# Patient Record
Sex: FEMALE | Race: WHITE | NOT HISPANIC OR LATINO | URBAN - METROPOLITAN AREA
[De-identification: names, ages, dates, MRNs, and addresses within clinical notes are randomized per-mention and may not be internally consistent; named-entity substitution may affect disease eponyms.]

---

## 2020-04-04 ENCOUNTER — OFFICE VISIT (OUTPATIENT)
Dept: URGENT CARE | Age: 45
End: 2020-04-04

## 2020-04-04 VITALS
SYSTOLIC BLOOD PRESSURE: 118 MMHG | DIASTOLIC BLOOD PRESSURE: 81 MMHG | RESPIRATION RATE: 20 BRPM | HEART RATE: 79 BPM | OXYGEN SATURATION: 96 % | TEMPERATURE: 100.4 F

## 2020-04-04 DIAGNOSIS — J06.9 ACUTE UPPER RESPIRATORY INFECTION, UNSPECIFIED: Primary | ICD-10-CM

## 2020-04-04 PROCEDURE — 99203 OFFICE O/P NEW LOW 30 MIN: CPT | Performed by: INTERNAL MEDICINE

## 2020-04-04 RX ORDER — DOXYCYCLINE HYCLATE 100 MG
100 TABLET ORAL 2 TIMES DAILY
Qty: 20 TABLET | Refills: 0 | Status: SHIPPED | OUTPATIENT
Start: 2020-04-04

## 2020-04-04 RX ORDER — BENZONATATE 200 MG/1
200 CAPSULE ORAL 3 TIMES DAILY PRN
Qty: 30 CAPSULE | Refills: 0 | Status: SHIPPED | OUTPATIENT
Start: 2020-04-04 | End: 2020-04-14

## 2020-12-22 PROBLEM — K58.9 IBS (IRRITABLE BOWEL SYNDROME): Status: RESOLVED | Noted: 2020-04-04 | Resolved: 2020-12-22

## 2020-12-22 PROBLEM — J30.2 SEASONAL ALLERGIES: Status: ACTIVE | Noted: 2020-04-04

## 2020-12-22 PROBLEM — Z15.01 BRCA1 POSITIVE: Status: ACTIVE | Noted: 2020-12-22

## 2020-12-22 PROBLEM — Z15.09 BRCA1 POSITIVE: Status: ACTIVE | Noted: 2020-12-22

## 2020-12-22 PROBLEM — Z90.5 H/O RIGHT NEPHRECTOMY: Status: ACTIVE | Noted: 2020-07-23

## 2020-12-22 PROBLEM — K21.9 GERD (GASTROESOPHAGEAL REFLUX DISEASE): Status: ACTIVE | Noted: 2020-07-23

## 2020-12-22 PROBLEM — C49.A0 GASTROINTESTINAL STROMAL TUMOR (GIST) (HCC): Status: ACTIVE | Noted: 2020-04-04

## 2020-12-22 PROBLEM — K58.9 IBS (IRRITABLE BOWEL SYNDROME): Status: ACTIVE | Noted: 2020-04-04

## 2020-12-22 PROBLEM — Z15.09 BRCA1 POSITIVE: Status: RESOLVED | Noted: 2020-12-22 | Resolved: 2020-12-22

## 2020-12-22 PROBLEM — Z15.01 BRCA1 POSITIVE: Status: RESOLVED | Noted: 2020-12-22 | Resolved: 2020-12-22

## 2021-03-11 PROCEDURE — 88305 TISSUE EXAM BY PATHOLOGIST: CPT | Performed by: RADIOLOGY

## 2022-08-08 ENCOUNTER — LAB ENCOUNTER (OUTPATIENT)
Dept: LAB | Age: 47
End: 2022-08-08
Attending: STUDENT IN AN ORGANIZED HEALTH CARE EDUCATION/TRAINING PROGRAM
Payer: COMMERCIAL

## 2022-08-08 DIAGNOSIS — R19.7 DIARRHEA, UNSPECIFIED TYPE: ICD-10-CM

## 2022-08-08 LAB — IGA SERPL-MCNC: 301 MG/DL (ref 70–312)

## 2022-08-08 PROCEDURE — 36415 COLL VENOUS BLD VENIPUNCTURE: CPT

## 2022-08-08 PROCEDURE — 86364 TISS TRNSGLTMNASE EA IG CLAS: CPT

## 2022-08-08 PROCEDURE — 82784 ASSAY IGA/IGD/IGG/IGM EACH: CPT

## 2022-08-09 LAB — TTG IGA SER-ACNC: 0.9 U/ML (ref ?–7)

## 2022-08-11 NOTE — PROGRESS NOTES
Jairo Thompson,    Your blood work shows no signs of celiac disease. Please let me know if you have any questions or concerns.      Sincerely,  Evens Castellon MD [[email or web address]] : Advised in office follow-up visit in 3 months to assess her neck and back pain and headaches.

## 2025-02-14 ENCOUNTER — OFFICE VISIT (OUTPATIENT)
Dept: SURGERY | Facility: CLINIC | Age: 50
End: 2025-02-14
Payer: COMMERCIAL

## 2025-02-14 VITALS
BODY MASS INDEX: 28 KG/M2 | DIASTOLIC BLOOD PRESSURE: 89 MMHG | OXYGEN SATURATION: 97 % | HEART RATE: 79 BPM | WEIGHT: 175 LBS | SYSTOLIC BLOOD PRESSURE: 140 MMHG

## 2025-02-14 DIAGNOSIS — C49.A0 GASTROINTESTINAL STROMAL TUMOR (GIST) (HCC): ICD-10-CM

## 2025-02-14 DIAGNOSIS — C78.6 SECONDARY MALIGNANCY OF PARIETAL PERITONEUM (HCC): Primary | ICD-10-CM

## 2025-02-14 DIAGNOSIS — R19.7 DIARRHEA, UNSPECIFIED TYPE: ICD-10-CM

## 2025-02-14 PROCEDURE — 3079F DIAST BP 80-89 MM HG: CPT | Performed by: SURGERY

## 2025-02-14 PROCEDURE — 3077F SYST BP >= 140 MM HG: CPT | Performed by: SURGERY

## 2025-02-14 PROCEDURE — 99205 OFFICE O/P NEW HI 60 MIN: CPT | Performed by: SURGERY

## 2025-02-14 NOTE — PATIENT INSTRUCTIONS
Surgery:  Cytoreductive surgery, possible multi-organ resection    Date of Surgery:      Hospital:    [address] [address]  Phone: [phone]    This is an inpatient procedure.  Use the provided Chlorhexadine surgical soap(instructions attached) to shower the night before and morning of your procedure.  Do not apply powders, creams, lotions or deodorant after showering.  Do not apply any kind of makeup and make sure to remove nail polish prior to your surgery.  For faster recovery from anesthesia and surgery please follow the instructions below regarding your pre-op diet:    Bowel Prep diet and instructions for colon surgery:  The day before surgery you may have a small breakfast and then start a clear liquid diet. This diet includes: clear beverages, clear soup or broth, and Jell-O.   A liquid bowel preparation will be called into your pharmacy. You must drink this preparation the day before surgery to clean out your colon. Follow the directions for the prep on the prescription and begin to take the prep between 2pm-3pm the day prior to surgery.  You will also need to take 2 oral antibiotics the day before your surgery to reduce the risk of infection. You will get a prescription for these or they will be sent electronically to your pharmacy. The antibiotics include: Neomycin 1gram and Flagyl 500mg. You will take these as directed at 2pm, 3pm, and 10pm the day before surgery.   12 hours prior to your surgery time you are to drink one 10oz bottle of Ensure Pre-Surgery Drink. You are to have NO solid food or water after 11pm the night before your surgery EXCEPT one additional 10oz bottle of Ensure Pre-Surgery Drink. You need to finish drinking this 4 hours prior to surgery time.    Take Tylenol 1000mg by mouth at the time of your second Ensure Pre-Surgery drink(4hrs prior to surgery time), unless instructed otherwise by your surgeon.     Bring your picture ID and insurance  card with you.  Wear comfortable clothing that can easily be removed. Preferably, something that zips, snaps, or buttons up the front.   You will be contacted by the hospital for pre-admission COVID-19 testing AND the day prior to your surgery to confirm details and give you specific instructions about when and where to arrive the day of your procedure.   If you are taking blood thinners including: Plavix, Eliquis, Coumadin you will need to contact the prescribing provider for specific instructions on holding these medications for your procedure  Motrin, Advil, Ibuprofen, Aspirin, Baby Aspirin and Fish Oil are also blood thinners and need to be held at least one week prior to your procedure. It is okay to take Tylenol.  Inform your primary care physician of your surgery and ask if him/her will need to see you prior to surgery.      Pre-Operative Testing  x CBC x CMP  BMP    PT, PTT, INR  UA x EKG    Chest X-Ray  Cardiac Clearance x [facility] Clearance     Tomás Be MD, FACS  Chief of Surgical Oncology  Co-Medical Director, Oncology Services  Professor of Surgery    For Dr. Be's office: [phone]  Fax: [phone]  After hours you will reach the answering service     Central [phone]  Medical Records:   [phone]

## 2025-02-14 NOTE — PROGRESS NOTES
[facility]      Patient Name:  Mark Stearns   YOB: 1975   Gender:  Female   Appt Date:  2/14/2025   Provider:  Tomás Be MD     PATIENT PROVIDERS  Referring Provider: Jakub Reddy MD    Primary Care Provider:Massimo Bright MD   Address: [address] [place] [address]  [place] [address]   Phone #: [phone]       CHIEF COMPLAINT  Chief Complaint   Patient presents with    Consult        PROBLEMS  Reviewed   Patient Active Problem List   Diagnosis    Gastrointestinal stromal tumor (GIST) (HCC)    GERD (gastroesophageal reflux disease)    H/O right nephrectomy    Seasonal allergies        History of Present Illness:  Patient is a 49-year-old woman who is currently being referred for consideration of surgical management of metastatic GIST.  Oncology history:  2002 - incidentally found small bowel GIST (age 27) when undergoing a left nephrectomy for RCC; evaluated at [facility] by Dr. Wagner and given 1 yr of adjuvant imatinib followed by serial imaging    7/31/20 - CT w/ subtle recurrent intraperitoneal mass lesion, including 5.6 x 4.0 x 5.1 cm L central mesenteric mass w/ central necrosis c/f recurrent GIST    8/20/20 - resection at [facility] ([place]) of ~20 peritoneal masses, 1 adherent to sigmoid colon; 4 main tumors (largest ~7 cm, smallest ~2 cm), and 2 cm tumor inside of small bowel mesentery, no tumors ruptured, 16 very tiny tumors (mostly on surface of small intestine); NSG w/ exon 11 KIT [id number] [id number]; path w/ GIST, spindle cell type, low grade (mitotic rate 0/5 mm2); peritoneal masses were 6.8 cm and 4.2 cm; mesenteric nodule was 2.8 cm; assorted peritoneal nodules were < 0.7 cm; R pelvic mass was 3.3 cm    10/2/20 - started on imatinib by Yesica Jorge at [facility]  11/11/20 - genetic counseling, germline testing negative    11/27/20 - CT w/ single residual mass LUQ lesion, slightly decreased; one additional very small possible nodule adjacent  to duodenal jejunal junction; mass either in posterior peritoneum or RP in LUQ at caudal margin of spleen, 3.0 x 2.3 cm    12/4/20 - only on brand name Gleevec x1 mo w/ tingling on scalp, periorbital edema, and facial tingling; MR brain negative; switched to generic imatinib w/ significant improvement in sx; takes imatinib w/ Compazine to help nausea    2/22/21 - CT w/ mild decrease of LUQ mass adjacent to distal duodenum, likely residual GIST; no e/o mets; mild dcrease in mass in posterior peritoneum vs RP, 2.8 x 1.6 cm, less conspicuous adjacent nodularity    6/21/21 - CT w/ LUQ mass no significant change; no new lesions; nodularity adjacent to 3rd/4th portion of duodenum similar, 9 x 7 mm    9/24/21 - CT w/ increased nodules from proximal descending colon and 4th portin of duodenum c/w enlarging GIST tumors    11/5/21 - CT w/ increased nodules size abutting 4th portion of duodenum/proximal descending colon; stable 0.8 cm nodule in medial R abdominis rectus muscle c/f tumor; 3.2 x 2.4 cm nodule inferior to spleen/proximal descending colon; 1.8 x 1.3 cm nodule anterior to 3rd/4th portion of duodenum; 0.8 x 0.6 cm nodule in medial aspect of R abdominis muscle    11/20/21 - increased imatinib to 400 mg BID  1/3/22 - slightly increased L-sided abdominal pain  1/25/22 - CT stable  4/29/22 CT AP: stable  8/1/22 CT AP: stable  12/6/22 CT AP  No significant change in heterogeneous mass in the left retroperitoneum and small soft  tissue nodule in the right rectus abdominis muscle. No CT evidence of new metastatic disease.  4/17/23 CT AP: stable exam  12/5/23 CT AP stable exam  4/23/24 CT AP  Increase in size of mass in the left renal fossa   Guardant KIT [id number] del, exon 11  6/3/24 day 1 cycle 1 sunatenib   11/13/2025: Interval decrease in size of the left perisplenic/renal fossa mass and lesion within the right rectus abdominis muscle.   2/11/2025: Continued decrease in size     Vital Signs:  /89 (BP Location:  Left arm, Patient Position: Sitting)   Pulse 79   Wt 79.4 kg (175 lb)   SpO2 97%   BMI 28.25 kg/m²      Medications Reviewed:    Current Outpatient Medications:     ondansetron (ZOFRAN) 8 MG tablet, Take 8 mg by mouth 2 (two) times a day., Disp: , Rfl:     PEG 3350-KCl-Na Bicarb-NaCl 420 g Oral Recon Soln, Take as directed by physician., Disp: 4000 mL, Rfl: 0    Imatinib Mesylate 400 MG Oral Tab, , Disp: , Rfl:     Omeprazole 40 MG Oral Capsule Delayed Release, Take 40 mg by mouth daily as needed., Disp: , Rfl:      Allergies Reviewed:  Allergies[1]     History:  Reviewed:  Past Medical History:    Gastrointestinal stromal tumor (GIST) (HCC)    followed at [facility], on chemo    Renal cell carcinoma, left (HCC)      Reviewed:  Past Surgical History:   Procedure Laterality Date    Bowel resection  08/20/2020    25 tumors resected from small bowel    Nephrectomy Left 11/2002    partial small bowel resection      Reviewed Social History:  Social History     Socioeconomic History    Marital status:    Tobacco Use    Smoking status: Never    Smokeless tobacco: Never   Vaping Use    Vaping status: Never Used   Substance and Sexual Activity    Alcohol use: Never    Drug use: Never   Social History Narrative    , 2 children.  Worked as  at [employer]. 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  Walking for exercise.      Social Drivers of Health      Received from [facility]    Housing Stability      Reviewed:  Family History   Problem Relation Age of Onset    Diabetes Mother     Thyroid Disorder Sister     Cancer Son 1        Wilms      Review of Systems:  GENERAL HEALTH: fatigue.   SKIN: no change in mole.   RESPIRATORY: denies shortness of breath, wheezing or cough   CARDIOVASCULAR: denies chest pain, SOB, edema,orthopnea, no palpitations   GI: denies nausea, vomiting, constipation, ++diarrhea; no rectal bleeding  GENITAL/: no blood in urine  MUSCULOSKELETAL: no joint  complaints, no back pain  NEURO: no tingling, numbness, weakness  ENDOCRINE: weight loss  PSYCH: no mood changes       Physical Examination:  Constitutional:  NAD.   Eyes: Sclera: non-icteric.   Lymph Nodes: Lymph Nodes no cervical LAD, supraclavicular LAD, axillary LAD, or inguinal LAD.   Lungs: Normal respiratory effort.  Cardiovascular: Well-perfused.  Abdomen: Soft, nontender, nondistended, no masses, no hepatosplenomegaly.  Musculoskeletal: Extremities: no edema.  No back or flank tenderness.  Skin: Inspection and palpation: no jaundice.      Document Review:  Summarized above.  I reviewed the 2 most recent CT scans.       Procedure(s):  None     Assessment / Plan:  GIST with peritoneal mets  Findings were discussed with patient at length.  Her  was present.  The case was also discussed at our multidisciplinary tumor board.  I further discussed with Dr. Reddy from medical oncology.  Given indolent disease, it is reasonable to consider surgery.  This will entail cytoreduction with possible multiorgan resection.  The surgical treatment matter including indications, rationale, and risks was discussed in detail.  Patient agreed and understood.  Arrangements will be made to schedule the procedure.  Patient had ample time to ask questions.     Diarrhea  Secondary to sunatenib which had been stopped 2 days ago.  Will refer to our GI colleagues for further management.    Screening  Patient has not had colonoscopy.  Preferred to do so prior to surgical management.            Electronically Signed by: Tomás Be MD           [1]   Allergies  Allergen Reactions    Codeine ITCHING, SHORTNESS OF BREATH, SWELLING and TONGUE SWELLING    Penicillins RASH, SHORTNESS OF BREATH, SWELLING and TONGUE SWELLING

## 2025-02-14 NOTE — H&P (VIEW-ONLY)
[facility]      Patient Name:  Mark Stearns   YOB: 1975   Gender:  Female   Appt Date:  2/14/2025   Provider:  Tomás Be MD     PATIENT PROVIDERS  Referring Provider: Jakub Reddy MD    Primary Care Provider:Massimo Bright MD   Address: [address] [place] [address]  [place] [address]   Phone #: [phone]       CHIEF COMPLAINT  Chief Complaint   Patient presents with    Consult        PROBLEMS  Reviewed   Patient Active Problem List   Diagnosis    Gastrointestinal stromal tumor (GIST) (HCC)    GERD (gastroesophageal reflux disease)    H/O right nephrectomy    Seasonal allergies        History of Present Illness:  Patient is a 49-year-old woman who is currently being referred for consideration of surgical management of metastatic GIST.  Oncology history:  2002 - incidentally found small bowel GIST (age 27) when undergoing a left nephrectomy for RCC; [place] [facility] [place] Wagner and given 1 yr of adjuvant imatinib followed by serial imaging    7/31/20 - CT w/ subtle recurrent intraperitoneal mass lesion, including 5.6 x 4.0 x 5.1 cm L central mesenteric mass w/ central necrosis c/f recurrent GIST    8/20/20 - resection at [facility] ([place]) of ~20 peritoneal masses, 1 adherent to sigmoid colon; 4 main tumors (largest ~7 cm, smallest ~2 cm), and 2 cm tumor inside of small bowel mesentery, no tumors ruptured, 16 very tiny tumors (mostly on surface of small intestine); NSG w/ exon 11 KIT [id number] c.[id number]; path w/ GIST, spindle cell type, low grade (mitotic rate 0/5 mm2); peritoneal masses were 6.8 cm and 4.2 cm; mesenteric nodule was 2.8 cm; assorted peritoneal nodules were < 0.7 cm; R pelvic mass was 3.3 cm    10/2/20 - started on imatinib by Yesica Jorge at [facility]  11/11/20 - genetic counseling, germline testing negative    11/27/20 - CT w/ single residual mass LUQ lesion, slightly decreased; one additional very small possible nodule adjacent  to duodenal jejunal junction; mass either in posterior peritoneum or RP in LUQ at caudal margin of spleen, 3.0 x 2.3 cm    12/4/20 - only on brand name Gleevec x1 mo w/ tingling on scalp, periorbital edema, and facial tingling; MR brain negative; switched to generic imatinib w/ significant improvement in sx; takes imatinib w/ Compazine to help nausea    2/22/21 - CT w/ mild decrease of LUQ mass adjacent to distal duodenum, likely residual GIST; no e/o mets; mild dcrease in mass in posterior peritoneum vs RP, 2.8 x 1.6 cm, less conspicuous adjacent nodularity    6/21/21 - CT w/ LUQ mass no significant change; no new lesions; nodularity adjacent to 3rd/4th portion of duodenum similar, 9 x 7 mm    9/24/21 - CT w/ increased nodules from proximal descending colon and 4th portin of duodenum c/w enlarging GIST tumors    11/5/21 - CT w/ increased nodules size abutting 4th portion of duodenum/proximal descending colon; stable 0.8 cm nodule in medial R abdominis rectus muscle c/f tumor; 3.2 x 2.4 cm nodule inferior to spleen/proximal descending colon; 1.8 x 1.3 cm nodule anterior to 3rd/4th portion of duodenum; 0.8 x 0.6 cm nodule in medial aspect of R abdominis muscle    11/20/21 - increased imatinib to 400 mg BID  1/3/22 - slightly increased L-sided abdominal pain  1/25/22 - CT stable  4/29/22 CT AP: stable  8/1/22 CT AP: stable  12/6/22 CT AP  No significant change in heterogeneous mass in the left retroperitoneum and small soft  tissue nodule in the right rectus abdominis muscle. No CT evidence of new metastatic disease.  4/17/23 CT AP: stable exam  12/5/23 CT AP stable exam  4/23/24 CT AP  Increase in size of mass in the left renal fossa   Guardant KIT [id number] del, exon 11  6/3/24 day 1 cycle 1 sunatenib   11/13/2025: Interval decrease in size of the left perisplenic/renal fossa mass and lesion within the right rectus abdominis muscle.   2/11/2025: Continued decrease in size     Vital Signs:  /89 (BP Location:  Left arm, Patient Position: Sitting)   Pulse 79   Wt 79.4 kg (175 lb)   SpO2 97%   BMI 28.25 kg/m²      Medications Reviewed:    Current Outpatient Medications:     ondansetron (ZOFRAN) 8 MG tablet, Take 8 mg by mouth 2 (two) times a day., Disp: , Rfl:     PEG 3350-KCl-Na Bicarb-NaCl 420 g Oral Recon Soln, Take as directed by physician., Disp: 4000 mL, Rfl: 0    Imatinib Mesylate 400 MG Oral Tab, , Disp: , Rfl:     Omeprazole 40 MG Oral Capsule Delayed Release, Take 40 mg by mouth daily as needed., Disp: , Rfl:      Allergies Reviewed:  Allergies[1]     History:  Reviewed:  Past Medical History:    Gastrointestinal stromal tumor (GIST) (HCC)    followed at [facility], on chemo    Renal cell carcinoma, left (HCC)      Reviewed:  Past Surgical History:   Procedure Laterality Date    Bowel resection  08/20/2020    25 tumors resected from small bowel    Nephrectomy Left 11/2002    partial small bowel resection      Reviewed Social History:  Social History     Socioeconomic History    Marital status:    Tobacco Use    Smoking status: Never    Smokeless tobacco: Never   Vaping Use    Vaping status: Never Used   Substance and Sexual Activity    Alcohol use: Never    Drug use: Never   Social History Narrative    , 2 children.  Worked as  at [employer]. 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  Walking for exercise.      Social Drivers of Health      Received from [facility]    Housing Stability      Reviewed:  Family History   Problem Relation Age of Onset    Diabetes Mother     Thyroid Disorder Sister     Cancer Son 1        Wilms      Review of Systems:  GENERAL HEALTH: fatigue.   SKIN: no change in mole.   RESPIRATORY: denies shortness of breath, wheezing or cough   CARDIOVASCULAR: denies chest pain, SOB, edema,orthopnea, no palpitations   GI: denies nausea, vomiting, constipation, ++diarrhea; no rectal bleeding  GENITAL/: no blood in urine  MUSCULOSKELETAL: no joint  complaints, no back pain  NEURO: no tingling, numbness, weakness  ENDOCRINE: weight loss  PSYCH: no mood changes       Physical Examination:  Constitutional:  NAD.   Eyes: Sclera: non-icteric.   Lymph Nodes: Lymph Nodes no cervical LAD, supraclavicular LAD, axillary LAD, or inguinal LAD.   Lungs: Normal respiratory effort.  Cardiovascular: Well-perfused.  Abdomen: Soft, nontender, nondistended, no masses, no hepatosplenomegaly.  Musculoskeletal: Extremities: no edema.  No back or flank tenderness.  Skin: Inspection and palpation: no jaundice.      Document Review:  Summarized above.  I reviewed the 2 most recent CT scans.       Procedure(s):  None     Assessment / Plan:  GIST with peritoneal mets  Findings were discussed with patient at length.  Her  was present.  The case was also discussed at our multidisciplinary tumor board.  I further discussed with Dr. Reddy from medical oncology.  Given indolent disease, it is reasonable to consider surgery.  This will entail cytoreduction with possible multiorgan resection.  The surgical treatment matter including indications, rationale, and risks was discussed in detail.  Patient agreed and understood.  Arrangements will be made to schedule the procedure.  Patient had ample time to ask questions.     Diarrhea  Secondary to sunatenib which had been stopped 2 days ago.  Will refer to our GI colleagues for further management.    Screening  Patient has not had colonoscopy.  Preferred to do so prior to surgical management.            Electronically Signed by: Tomás Be MD           [1]   Allergies  Allergen Reactions    Codeine ITCHING, SHORTNESS OF BREATH, SWELLING and TONGUE SWELLING    Penicillins RASH, SHORTNESS OF BREATH, SWELLING and TONGUE SWELLING

## 2025-02-20 DIAGNOSIS — C49.A0 GASTROINTESTINAL STROMAL TUMOR (GIST) (HCC): Primary | ICD-10-CM

## 2025-02-21 PROBLEM — I10 PRIMARY HYPERTENSION: Status: ACTIVE | Noted: 2024-08-20

## 2025-02-21 PROBLEM — C78.7 MALIGNANT NEOPLASM METASTATIC TO LIVER (HCC): Status: ACTIVE | Noted: 2020-07-23

## 2025-02-21 PROBLEM — C49.A0 GIST (GASTROINTESTINAL STROMAL TUMOR), MALIGNANT (HCC): Status: ACTIVE | Noted: 2025-02-21

## 2025-02-21 PROBLEM — C79.9 METASTATIC ADENOCARCINOMA (HCC): Status: ACTIVE | Noted: 2023-10-24

## 2025-02-23 PROBLEM — Z12.11 COLON CANCER SCREENING: Status: ACTIVE | Noted: 2025-02-23

## 2025-03-05 ENCOUNTER — LABORATORY ENCOUNTER (OUTPATIENT)
Dept: LAB | Facility: HOSPITAL | Age: 50
End: 2025-03-05
Attending: SURGERY
Payer: COMMERCIAL

## 2025-03-05 ENCOUNTER — ANESTHESIA EVENT (OUTPATIENT)
Dept: SURGERY | Facility: HOSPITAL | Age: 50
End: 2025-03-05
Payer: COMMERCIAL

## 2025-03-05 DIAGNOSIS — Z01.818 PRE-OP TESTING: ICD-10-CM

## 2025-03-05 LAB
ANTIBODY SCREEN: NEGATIVE
RH BLOOD TYPE: POSITIVE

## 2025-03-05 PROCEDURE — 86900 BLOOD TYPING SEROLOGIC ABO: CPT

## 2025-03-05 PROCEDURE — 86901 BLOOD TYPING SEROLOGIC RH(D): CPT

## 2025-03-05 PROCEDURE — 86850 RBC ANTIBODY SCREEN: CPT

## 2025-03-11 ENCOUNTER — ANESTHESIA EVENT (OUTPATIENT)
Dept: ENDOSCOPY | Facility: HOSPITAL | Age: 50
End: 2025-03-11

## 2025-03-11 ENCOUNTER — ANESTHESIA (OUTPATIENT)
Dept: ENDOSCOPY | Facility: HOSPITAL | Age: 50
End: 2025-03-11

## 2025-03-11 ENCOUNTER — HOSPITAL ENCOUNTER (OUTPATIENT)
Facility: HOSPITAL | Age: 50
Setting detail: HOSPITAL OUTPATIENT SURGERY
Discharge: HOME | DRG: 330 | End: 2025-03-11
Attending: INTERNAL MEDICINE | Admitting: INTERNAL MEDICINE
Payer: COMMERCIAL

## 2025-03-11 ENCOUNTER — HOSPITAL ENCOUNTER (OUTPATIENT)
Facility: HOSPITAL | Age: 50
Setting detail: HOSPITAL OUTPATIENT SURGERY
Discharge: HOME | End: 2025-03-11
Attending: INTERNAL MEDICINE | Admitting: INTERNAL MEDICINE
Payer: COMMERCIAL

## 2025-03-11 VITALS
TEMPERATURE: 98 F | RESPIRATION RATE: 18 BRPM | DIASTOLIC BLOOD PRESSURE: 80 MMHG | SYSTOLIC BLOOD PRESSURE: 112 MMHG | HEART RATE: 65 BPM | WEIGHT: 170 LBS | OXYGEN SATURATION: 99 % | BODY MASS INDEX: 27.32 KG/M2 | HEIGHT: 66 IN

## 2025-03-11 DIAGNOSIS — Z12.11 SCREEN FOR COLON CANCER: ICD-10-CM

## 2025-03-11 LAB — B-HCG UR QL: NEGATIVE

## 2025-03-11 PROCEDURE — 88305 TISSUE EXAM BY PATHOLOGIST: CPT | Performed by: INTERNAL MEDICINE

## 2025-03-11 PROCEDURE — 81025 URINE PREGNANCY TEST: CPT

## 2025-03-11 PROCEDURE — 0DBE8ZX EXCISION OF LARGE INTESTINE, VIA NATURAL OR ARTIFICIAL OPENING ENDOSCOPIC, DIAGNOSTIC: ICD-10-PCS | Performed by: INTERNAL MEDICINE

## 2025-03-11 RX ORDER — SODIUM CHLORIDE, SODIUM LACTATE, POTASSIUM CHLORIDE, CALCIUM CHLORIDE 600; 310; 30; 20 MG/100ML; MG/100ML; MG/100ML; MG/100ML
INJECTION, SOLUTION INTRAVENOUS CONTINUOUS
Status: CANCELLED | OUTPATIENT
Start: 2025-03-11

## 2025-03-11 RX ORDER — LIDOCAINE HYDROCHLORIDE 10 MG/ML
INJECTION, SOLUTION EPIDURAL; INFILTRATION; INTRACAUDAL; PERINEURAL AS NEEDED
Status: DISCONTINUED | OUTPATIENT
Start: 2025-03-11 | End: 2025-03-11 | Stop reason: SURG

## 2025-03-11 RX ORDER — SODIUM CHLORIDE, SODIUM LACTATE, POTASSIUM CHLORIDE, CALCIUM CHLORIDE 600; 310; 30; 20 MG/100ML; MG/100ML; MG/100ML; MG/100ML
INJECTION, SOLUTION INTRAVENOUS CONTINUOUS
Status: DISCONTINUED | OUTPATIENT
Start: 2025-03-11 | End: 2025-03-11

## 2025-03-11 RX ORDER — HEPARIN SODIUM 5000 [USP'U]/ML
5000 INJECTION, SOLUTION INTRAVENOUS; SUBCUTANEOUS ONCE
Status: CANCELLED | OUTPATIENT
Start: 2025-03-11 | End: 2025-03-11

## 2025-03-11 RX ADMIN — LIDOCAINE HYDROCHLORIDE 50 MG: 10 INJECTION, SOLUTION EPIDURAL; INFILTRATION; INTRACAUDAL; PERINEURAL at 13:04:00

## 2025-03-11 NOTE — DISCHARGE INSTRUCTIONS
Home Care Instructions for Colonoscopy with Sedation    Diet:  - Resume your regular diet as tolerated unless otherwise instructed.  - Start with light meals to minimize bloating.  - Do not drink alcohol today.    Medication:  - If you have questions about resuming your normal medications, please contact your Primary Care Physician.    Activities:  - Take it easy today. Do not return to work today.  - Do not drive today.  - Do not operate any machinery today (including kitchen equipment).    Colonoscopy:  - You may notice some rectal \"spotting\" (a little blood on the toilet tissue) for a day or two after the exam. This is normal.  - If you experience any rectal bleeding (not spotting), persistent tenderness or sharp severe abdominal pains, oral temperature over 100 degrees Fahrenheit, light-headedness or dizziness, or any other problems, contact your doctor.      **If unable to reach your doctor, please go to the [facility]**    - Your referring physician will receive a full report of your examination.  - If you do not hear from your doctor's office within two weeks of your biopsy, please call them for your results.    You may be able to see your laboratory results in [employer] between 4 and 7 business days.  In some cases, your physician may not have viewed the results before they are released to [employer].  If you have questions regarding your results contact the physician who ordered the test/exam by phone or via [employer] by choosing \"Ask a Medical Question.\"

## 2025-03-11 NOTE — ANESTHESIA PREPROCEDURE EVALUATION
PRE-OP EVALUATION    Patient Name: Mark Stearns    Admit Diagnosis: Screen for colon cancer [Z12.11]    Pre-op Diagnosis: Screen for colon cancer [Z12.11]    COLONOSCOPY    Anesthesia Procedure: COLONOSCOPY    Surgeons and Role:     * Keyshawn Marshall MD - Primary    Pre-op vitals reviewed.  Temp: 97.9 °F (36.6 °C)  Pulse: 67  Resp: 20  BP: 127/80  SpO2: 100 %  Body mass index is 27.44 kg/m².    Current medications reviewed.  Hospital Medications:   lactated ringers infusion   Intravenous Continuous       Outpatient Medications:   Prescriptions Prior to Admission[1]    Allergies: Codeine, Opioid analgesics, and Penicillins      Anesthesia Evaluation    Patient summary reviewed.    Anesthetic Complications  (+) history of anesthetic complications  History of: PONV       GI/Hepatic/Renal    Negative GI/hepatic/renal ROS.  (+) GERD                           Cardiovascular    Negative cardiovascular ROS.    Exercise tolerance: good     MET: >4      (+) hypertension                                     Endo/Other    Negative endo/other ROS.                              Pulmonary    Negative pulmonary ROS.                       Neuro/Psych    Negative neuro/psych ROS.                          Ho renal cell ca        Past Surgical History:   Procedure Laterality Date    Bowel resection  08/20/2020    25 tumors resected from small bowel    Nephrectomy Left 11/2002    partial small bowel resection     Social History     Socioeconomic History    Marital status:    Tobacco Use    Smoking status: Never    Smokeless tobacco: Never   Vaping Use    Vaping status: Never Used   Substance and Sexual Activity    Alcohol use: Yes     Comment: 1X/MONTH    Drug use: Never     History   Drug Use Unknown     Available pre-op labs reviewed.               Airway      Mallampati: II  Mouth opening: 3 FB  TM distance: 4 - 6 cm  Neck ROM: full Cardiovascular    Cardiovascular exam normal.  Rhythm: regular  Rate: normal  (-)  murmur   Dental             Pulmonary    Pulmonary exam normal.  Breath sounds clear to auscultation bilaterally.               Other findings              ASA: 2   Plan: MAC  NPO status verified and patient meets guidelines.    Post-procedure pain management plan discussed with surgeon and patient.    Comment: Discussed MAC anesthesia with patient.  Discussed risks including but not limited to perioperative awareness, conversion to general anesthesia and risks associated with GA.  PT understands and agrees to proceed.    Plan/risks discussed with: patient                Present on Admission:  **None**             [1]   Medications Prior to Admission   Medication Sig Dispense Refill Last Dose/Taking    amLODIPine 5 MG Oral Tab Take 1 tablet (5 mg total) by mouth daily.   3/11/2025    Na Sulfate-K Sulfate-Mg Sulf (SUPREP BOWEL PREP KIT) 17.5-3.13-1.6 GM/177ML Oral Solution Take as directed by physician. 354 mL 0 3/10/2025    Omeprazole 40 MG Oral Capsule Delayed Release Take 1 capsule (40 mg total) by mouth daily as needed.   3/7/2025    SUNItinib Malate 25 MG Oral Cap Take 1 capsule (25 mg total) by mouth daily. (Patient not taking: Reported on 3/4/2025)       neomycin 500 MG Oral Tab Take 2 tablets (1,000 mg total) by mouth 3 (three) times daily. Take 2 tablets at 2 pm, 3 pm and 10 pm the day before surgery. (Patient not taking: Reported on 2025) 6 tablet 0     metroNIDAZOLE (FLAGYL) 500 MG Oral Tab Take 1 tablet (500 mg total) by mouth 3 (three) times daily. Take 1 tablet at 2 pm, 3 pm and 10 pm the day before surgery. (Patient not taking: Reported on 2025) 3 tablet 0     [] polyethylene glycol, PEG 3350-KCl-NaBcb-NaCl-NaSulf, 236 g Oral Recon Soln Take 4,000 mL by mouth once for 1 dose. Begin at 10 am the morning before surgery.  Follow pharmacy product instructions. 1 each 0     PEG 3350-KCl-Na Bicarb-NaCl 420 g Oral Recon Soln Take as directed by physician. (Patient not taking: Reported on  2/21/2025) 4000 mL 0

## 2025-03-11 NOTE — H&P
[facility]  Pre-op H and P    Mark Stearns Patient Status:  Hospital Outpatient Surgery    1975 MRN [id number]   Location [facility] Attending Keyshawn Marshall MD   Date 3/11/2025 PCP Prachi Arenas MD     CC: Screen for colon cancer     History of Present Illness:  Mark Stearns is a a(n) 50 year old female. Screen for colon cancer     History:  Past Medical History:    Abdominal hernia    Abdominal pain    Arthritis    Bloating    Decorative tattoo    Esophageal reflux    Fatigue    Flatulence/gas pain/belching    Gastrointestinal stromal tumor (GIST) (HCC)    followed at [facility], on chemo    Headache disorder    Heartburn    High blood pressure    Hx of motion sickness    IBS (irritable bowel syndrome)    Indigestion    Leaking of urine    Pain with bowel movements    Personal history of antineoplastic chemotherapy    LAST ORAL TREATMENT    PONV (postoperative nausea and vomiting)    Renal cell carcinoma, left (HCC)    Renal disorder    RIGHT KIDNEY ONLY, LEFT KIDNEY REMOVED     Stool incontinence    Stress    Uncomfortable fullness after meals    Visual impairment    GLASSES    Wears glasses    Weight loss     Past Surgical History:   Procedure Laterality Date    Bowel resection  2020    25 tumors resected from small bowel    Nephrectomy Left 2002    partial small bowel resection     Family History   Problem Relation Age of Onset    Diabetes Mother     Thyroid Disorder Sister     Cancer Son 1        [place]      reports that she has never smoked. She has never used smokeless tobacco. She reports current alcohol use. She reports that she does not use drugs.    Allergies:  Allergies[1]    Medications:    Current Facility-Administered Medications:     lactated ringers infusion, , Intravenous, Continuous    Physical Exam:    Height 5' 6\" (1.676 m), weight 170 lb (77.1 kg), last menstrual period 2025.    General: Appears alert, oriented x3 and  in no acute distress.  CV: Normal rate   Lungs: Normal effort   Skin: Warm and dry.  Laboratory Data:       Imaging:      Assessment/Plan/Procedure:  Patient Active Problem List   Diagnosis    Gastrointestinal stromal tumor (GIST) (HCC)    GERD (gastroesophageal reflux disease)    H/O right nephrectomy    Seasonal allergies    Secondary malignancy of parietal peritoneum (HCC)    Diarrhea    GIST (gastrointestinal stromal tumor), malignant (HCC)    Malignant neoplasm metastatic to liver (HCC)    Primary hypertension    Renal cell carcinoma (HCC)    Metastatic adenocarcinoma (HCC)    Colon cancer screening       Screen for colon cancer     Plan:  Colonoscopy    Keyshawn Marshall MD  3/11/2025  11:55 AM       [1]   Allergies  Allergen Reactions    Codeine ITCHING, SHORTNESS OF BREATH, SWELLING and TONGUE SWELLING    Opioid Analgesics ITCHING, OTHER (SEE COMMENTS) and SHORTNESS OF BREATH    Penicillins RASH, SHORTNESS OF BREATH, SWELLING and TONGUE SWELLING

## 2025-03-11 NOTE — ANESTHESIA POSTPROCEDURE EVALUATION
[facility]    Mark Stearns Patient Status:  Hospital Outpatient Surgery   Age/Gender 50 year old female MRN [id number]   Location [facility] ENDOSCOPY [facility]IN [facility]ENTER Attending Keyshawn Marshall MD   Hosp Day # 0 PCP Prachi Arenas MD       Anesthesia Post-op Note    COLONOSCOPY with biopsies    Procedure Summary       Date: 03/11/25 Room / Location:  ENDOSCOPY 02 / [facility] ENDOSCOPY    Anesthesia Start: 1300 Anesthesia Stop: 1326    Procedure: COLONOSCOPY with biopsies Diagnosis:       Screen for colon cancer      (diverticulosis)    Surgeons: Keyshawn Marshall MD Anesthesiologist: Chinmay Hernández MD    Anesthesia Type: MAC ASA Status: 2            Anesthesia Type: MAC    Vitals Value Taken Time   /51 03/11/25 1326   Temp 98 03/11/25 1331   Pulse 71 03/11/25 1331   Resp 18 03/11/25 1331   SpO2 98 % 03/11/25 1331   Vitals shown include unfiled device data.        Patient Location: Endoscopy    Anesthesia Type: MAC    Airway Patency: patent and extubated    Postop Pain Control: adequate    Mental Status: mildly sedated but able to meaningfully participate in the post-anesthesia evaluation    Nausea/Vomiting: none    Cardiopulmonary/Hydration status: stable euvolemic    Complications: no apparent anesthesia related complications    Postop vital signs: stable    Dental Exam: Unchanged from Preop    Patient to be discharged from PACU when criteria met.

## 2025-03-11 NOTE — OPERATIVE REPORT
Mark Stearns Patient Status:  Hospital Outpatient Surgery    1975 MRN [id number]   Location [facility] END[facility] Attending No att. providers found   Date 3/11/2025 PCP Prcahi Arenas MD     PREOPERATIVE DIAGNOSIS/INDICATION: Diarrhea, screening  POSTOPERTATIVE DIAGNOSIS: Diverticulosis  PROCEDURE PERFORMED: COLONOSCOPY with biopsy  SEDATION: MAC sedation provided by General Anesthesia    TIME OUT WAS PERFORMED    INFORMED CONSENT: Risks, benefits and alternatives to the procedure were explained to the patient including but not limited to bleeding, infection, perforation, adverse drug reactions, pancreatitis and the need for hospitalization and surgery if this occurs, the patient understands and agrees to procedure.  PROCEDURE DESCRIPTION: After careful digital rectal examination a pediatric colonoscope was introduced into the patients rectum, advanced pass the recto sigmoid junction, into the descending colon, splenic flexure, transverse colon, hepatic flexure, ascending colon, cecum and the last 5-10cm of the terminal ileum, confirmed by landmarks, including the appendiceal orifice and ileocecal valve. Careful examination of the above described areas was performed on withdrawal of the endoscope. Retroflexion was performed on the rectum. The patient tolerated the procedure well, there were no immediate complication immediately following the procedure, and the patient was transferred to recovery in stable condition.  QUALITY OF PREPARATION: Ruth Bowel Preparation Scale:            -      Right colon 3, Transverse colon 3, Left colon 3   FINDINGS/THERAPEUTICS:  TERMINAL ILEUM: Normal  COLON: Moderate left sided diverticulosis, random mucosal biopsies were performed with cold forceps  RECOMMENDATIONS:   Post Colonoscopy precautions, watch for bleeding, infection, perforation, adverse drug reactions   Follow biopsies  Repeat colonoscopy in 10 years.    Keyshawn Marshall,  MD  3/11/2025  2:06 PM

## 2025-03-14 ENCOUNTER — HOSPITAL ENCOUNTER (INPATIENT)
Facility: HOSPITAL | Age: 50
LOS: 5 days | Discharge: HOME | End: 2025-03-19
Attending: SURGERY | Admitting: SURGERY
Payer: COMMERCIAL

## 2025-03-14 ENCOUNTER — ANESTHESIA (OUTPATIENT)
Dept: SURGERY | Facility: HOSPITAL | Age: 50
End: 2025-03-14
Payer: COMMERCIAL

## 2025-03-14 ENCOUNTER — HOSPITAL ENCOUNTER (INPATIENT)
Facility: HOSPITAL | Age: 50
LOS: 5 days | Discharge: HOME | DRG: 330 | End: 2025-03-19
Attending: SURGERY | Admitting: SURGERY
Payer: COMMERCIAL

## 2025-03-14 DIAGNOSIS — Z01.818 PRE-OP TESTING: Primary | ICD-10-CM

## 2025-03-14 DIAGNOSIS — Z74.09 DECREASED FUNCTIONAL MOBILITY AND ENDURANCE: ICD-10-CM

## 2025-03-14 DIAGNOSIS — C49.A0 GASTROINTESTINAL STROMAL TUMOR (GIST) (HCC): ICD-10-CM

## 2025-03-14 LAB — B-HCG UR QL: NEGATIVE

## 2025-03-14 PROCEDURE — 81025 URINE PREGNANCY TEST: CPT

## 2025-03-14 PROCEDURE — 88341 IMHCHEM/IMCYTCHM EA ADD ANTB: CPT | Performed by: SURGERY

## 2025-03-14 PROCEDURE — 36410 VNPNXR 3YR/> PHY/QHP DX/THER: CPT

## 2025-03-14 PROCEDURE — 88305 TISSUE EXAM BY PATHOLOGIST: CPT | Performed by: SURGERY

## 2025-03-14 PROCEDURE — 0FBG0ZZ EXCISION OF PANCREAS, OPEN APPROACH: ICD-10-PCS | Performed by: SURGERY

## 2025-03-14 PROCEDURE — 76942 ECHO GUIDE FOR BIOPSY: CPT | Performed by: INTERNAL MEDICINE

## 2025-03-14 PROCEDURE — 88342 IMHCHEM/IMCYTCHM 1ST ANTB: CPT | Performed by: SURGERY

## 2025-03-14 PROCEDURE — 0W9B30Z DRAINAGE OF LEFT PLEURAL CAVITY WITH DRAINAGE DEVICE, PERCUTANEOUS APPROACH: ICD-10-PCS | Performed by: SURGERY

## 2025-03-14 PROCEDURE — 3E0T3BZ INTRODUCTION OF ANESTHETIC AGENT INTO PERIPHERAL NERVES AND PLEXI, PERCUTANEOUS APPROACH: ICD-10-PCS | Performed by: SURGERY

## 2025-03-14 PROCEDURE — S0028 INJECTION, FAMOTIDINE, 20 MG: HCPCS | Performed by: PHYSICIAN ASSISTANT

## 2025-03-14 PROCEDURE — 07TP0ZZ RESECTION OF SPLEEN, OPEN APPROACH: ICD-10-PCS | Performed by: SURGERY

## 2025-03-14 PROCEDURE — 0DBA0ZZ EXCISION OF JEJUNUM, OPEN APPROACH: ICD-10-PCS | Performed by: SURGERY

## 2025-03-14 PROCEDURE — 0DBV0ZZ EXCISION OF MESENTERY, OPEN APPROACH: ICD-10-PCS | Performed by: SURGERY

## 2025-03-14 PROCEDURE — 0BBT0ZZ EXCISION OF DIAPHRAGM, OPEN APPROACH: ICD-10-PCS | Performed by: SURGERY

## 2025-03-14 DEVICE — SEPRAFILM ADHESION BARRIER (MEMBRANE) IS A STERILE, BIORESORBABLE, TRANSLUCENT ADHESION BARRIER COMPOSED OF TWO ANIONIC POLYSACCHARIDES, SODIUM HYALURONATE (HA) AND CARBOXYMETHYLCELLULOSE (CMC).
Type: IMPLANTABLE DEVICE | Site: ABDOMEN | Status: FUNCTIONAL
Brand: SEPRAFILM

## 2025-03-14 RX ORDER — FAMOTIDINE 20 MG/1
20 TABLET, FILM COATED ORAL 2 TIMES DAILY
Status: DISCONTINUED | OUTPATIENT
Start: 2025-03-14 | End: 2025-03-19

## 2025-03-14 RX ORDER — SODIUM CHLORIDE 9 MG/ML
INJECTION, SOLUTION INTRAVENOUS CONTINUOUS
Status: DISCONTINUED | OUTPATIENT
Start: 2025-03-14 | End: 2025-03-15

## 2025-03-14 RX ORDER — ACETAMINOPHEN 500 MG
1000 TABLET ORAL EVERY 6 HOURS
Status: DISCONTINUED | OUTPATIENT
Start: 2025-03-14 | End: 2025-03-19

## 2025-03-14 RX ORDER — SODIUM CHLORIDE, SODIUM LACTATE, POTASSIUM CHLORIDE, CALCIUM CHLORIDE 600; 310; 30; 20 MG/100ML; MG/100ML; MG/100ML; MG/100ML
INJECTION, SOLUTION INTRAVENOUS CONTINUOUS
Status: DISCONTINUED | OUTPATIENT
Start: 2025-03-14 | End: 2025-03-14 | Stop reason: HOSPADM

## 2025-03-14 RX ORDER — DIPHENHYDRAMINE HYDROCHLORIDE 50 MG/ML
12.5 INJECTION, SOLUTION INTRAMUSCULAR; INTRAVENOUS EVERY 4 HOURS PRN
Status: DISCONTINUED | OUTPATIENT
Start: 2025-03-14 | End: 2025-03-16

## 2025-03-14 RX ORDER — MIDAZOLAM HYDROCHLORIDE 1 MG/ML
INJECTION INTRAMUSCULAR; INTRAVENOUS AS NEEDED
Status: DISCONTINUED | OUTPATIENT
Start: 2025-03-14 | End: 2025-03-14 | Stop reason: SURG

## 2025-03-14 RX ORDER — CEFTRIAXONE 2 G/1
INJECTION, POWDER, FOR SOLUTION INTRAMUSCULAR; INTRAVENOUS
Status: DISPENSED
Start: 2025-03-14 | End: 2025-03-14

## 2025-03-14 RX ORDER — HYDROMORPHONE HYDROCHLORIDE 1 MG/ML
INJECTION, SOLUTION INTRAMUSCULAR; INTRAVENOUS; SUBCUTANEOUS AS NEEDED
Status: DISCONTINUED | OUTPATIENT
Start: 2025-03-14 | End: 2025-03-14 | Stop reason: SURG

## 2025-03-14 RX ORDER — BUPIVACAINE HYDROCHLORIDE 2.5 MG/ML
INJECTION, SOLUTION EPIDURAL; INFILTRATION; INTRACAUDAL; PERINEURAL AS NEEDED
Status: DISCONTINUED | OUTPATIENT
Start: 2025-03-14 | End: 2025-03-14 | Stop reason: HOSPADM

## 2025-03-14 RX ORDER — DIPHENHYDRAMINE HYDROCHLORIDE 50 MG/ML
INJECTION, SOLUTION INTRAMUSCULAR; INTRAVENOUS
Status: COMPLETED
Start: 2025-03-14 | End: 2025-03-14

## 2025-03-14 RX ORDER — DEXAMETHASONE SODIUM PHOSPHATE 4 MG/ML
VIAL (ML) INJECTION AS NEEDED
Status: DISCONTINUED | OUTPATIENT
Start: 2025-03-14 | End: 2025-03-14 | Stop reason: SURG

## 2025-03-14 RX ORDER — HEPARIN SODIUM 5000 [USP'U]/ML
5000 INJECTION, SOLUTION INTRAVENOUS; SUBCUTANEOUS ONCE
Status: COMPLETED | OUTPATIENT
Start: 2025-03-14 | End: 2025-03-14

## 2025-03-14 RX ORDER — DIPHENHYDRAMINE HCL 25 MG
25 CAPSULE ORAL EVERY 4 HOURS PRN
Status: DISCONTINUED | OUTPATIENT
Start: 2025-03-14 | End: 2025-03-19

## 2025-03-14 RX ORDER — SCOPOLAMINE 1 MG/3D
1 PATCH, EXTENDED RELEASE TRANSDERMAL
Status: COMPLETED | OUTPATIENT
Start: 2025-03-14 | End: 2025-03-16

## 2025-03-14 RX ORDER — SODIUM CHLORIDE, SODIUM LACTATE, POTASSIUM CHLORIDE, CALCIUM CHLORIDE 600; 310; 30; 20 MG/100ML; MG/100ML; MG/100ML; MG/100ML
INJECTION, SOLUTION INTRAVENOUS CONTINUOUS
Status: DISCONTINUED | OUTPATIENT
Start: 2025-03-14 | End: 2025-03-16

## 2025-03-14 RX ORDER — MIDAZOLAM HYDROCHLORIDE 1 MG/ML
1 INJECTION INTRAMUSCULAR; INTRAVENOUS EVERY 5 MIN PRN
Status: DISCONTINUED | OUTPATIENT
Start: 2025-03-14 | End: 2025-03-14 | Stop reason: HOSPADM

## 2025-03-14 RX ORDER — METRONIDAZOLE 500 MG/100ML
500 INJECTION, SOLUTION INTRAVENOUS ONCE
Status: COMPLETED | OUTPATIENT
Start: 2025-03-14 | End: 2025-03-14

## 2025-03-14 RX ORDER — LIDOCAINE HYDROCHLORIDE 10 MG/ML
INJECTION, SOLUTION EPIDURAL; INFILTRATION; INTRACAUDAL; PERINEURAL AS NEEDED
Status: DISCONTINUED | OUTPATIENT
Start: 2025-03-14 | End: 2025-03-14 | Stop reason: SURG

## 2025-03-14 RX ORDER — NALOXONE HYDROCHLORIDE 0.4 MG/ML
0.08 INJECTION, SOLUTION INTRAMUSCULAR; INTRAVENOUS; SUBCUTANEOUS
Status: DISCONTINUED | OUTPATIENT
Start: 2025-03-14 | End: 2025-03-16

## 2025-03-14 RX ORDER — ONDANSETRON 2 MG/ML
4 INJECTION INTRAMUSCULAR; INTRAVENOUS EVERY 6 HOURS PRN
Status: DISCONTINUED | OUTPATIENT
Start: 2025-03-14 | End: 2025-03-19

## 2025-03-14 RX ORDER — NALOXONE HYDROCHLORIDE 0.4 MG/ML
0.08 INJECTION, SOLUTION INTRAMUSCULAR; INTRAVENOUS; SUBCUTANEOUS AS NEEDED
Status: DISCONTINUED | OUTPATIENT
Start: 2025-03-14 | End: 2025-03-14 | Stop reason: HOSPADM

## 2025-03-14 RX ORDER — DIPHENHYDRAMINE HYDROCHLORIDE 50 MG/ML
12.5 INJECTION, SOLUTION INTRAMUSCULAR; INTRAVENOUS EVERY 4 HOURS PRN
Status: DISCONTINUED | OUTPATIENT
Start: 2025-03-14 | End: 2025-03-19

## 2025-03-14 RX ORDER — 0.9 % SODIUM CHLORIDE 0.9 %
INTRAVENOUS SOLUTION INTRAVENOUS
Status: COMPLETED
Start: 2025-03-14 | End: 2025-03-14

## 2025-03-14 RX ORDER — SCOPOLAMINE 1 MG/3D
PATCH, EXTENDED RELEASE TRANSDERMAL
Status: COMPLETED
Start: 2025-03-14 | End: 2025-03-16

## 2025-03-14 RX ORDER — HYDROMORPHONE HYDROCHLORIDE 1 MG/ML
0.6 INJECTION, SOLUTION INTRAMUSCULAR; INTRAVENOUS; SUBCUTANEOUS EVERY 5 MIN PRN
Status: DISCONTINUED | OUTPATIENT
Start: 2025-03-14 | End: 2025-03-14 | Stop reason: HOSPADM

## 2025-03-14 RX ORDER — ENOXAPARIN SODIUM 100 MG/ML
40 INJECTION SUBCUTANEOUS DAILY
Status: DISCONTINUED | OUTPATIENT
Start: 2025-03-15 | End: 2025-03-19

## 2025-03-14 RX ORDER — HYDROMORPHONE HYDROCHLORIDE 1 MG/ML
0.2 INJECTION, SOLUTION INTRAMUSCULAR; INTRAVENOUS; SUBCUTANEOUS EVERY 5 MIN PRN
Status: DISCONTINUED | OUTPATIENT
Start: 2025-03-14 | End: 2025-03-14 | Stop reason: HOSPADM

## 2025-03-14 RX ORDER — METRONIDAZOLE 500 MG/100ML
INJECTION, SOLUTION INTRAVENOUS
Status: DISPENSED
Start: 2025-03-14 | End: 2025-03-14

## 2025-03-14 RX ORDER — PROCHLORPERAZINE EDISYLATE 5 MG/ML
5 INJECTION INTRAMUSCULAR; INTRAVENOUS EVERY 8 HOURS PRN
Status: DISCONTINUED | OUTPATIENT
Start: 2025-03-14 | End: 2025-03-14 | Stop reason: HOSPADM

## 2025-03-14 RX ORDER — ONDANSETRON 2 MG/ML
4 INJECTION INTRAMUSCULAR; INTRAVENOUS EVERY 6 HOURS PRN
Status: DISCONTINUED | OUTPATIENT
Start: 2025-03-14 | End: 2025-03-14 | Stop reason: HOSPADM

## 2025-03-14 RX ORDER — HEPARIN SODIUM 5000 [USP'U]/ML
INJECTION, SOLUTION INTRAVENOUS; SUBCUTANEOUS
Status: COMPLETED
Start: 2025-03-14 | End: 2025-03-14

## 2025-03-14 RX ORDER — HYDROMORPHONE HYDROCHLORIDE 1 MG/ML
INJECTION, SOLUTION INTRAMUSCULAR; INTRAVENOUS; SUBCUTANEOUS
Status: DISCONTINUED
Start: 2025-03-14 | End: 2025-03-14 | Stop reason: WASHOUT

## 2025-03-14 RX ORDER — ONDANSETRON 2 MG/ML
INJECTION INTRAMUSCULAR; INTRAVENOUS AS NEEDED
Status: DISCONTINUED | OUTPATIENT
Start: 2025-03-14 | End: 2025-03-14 | Stop reason: SURG

## 2025-03-14 RX ORDER — FAMOTIDINE 10 MG/ML
20 INJECTION, SOLUTION INTRAVENOUS 2 TIMES DAILY
Status: DISCONTINUED | OUTPATIENT
Start: 2025-03-14 | End: 2025-03-19

## 2025-03-14 RX ORDER — SODIUM CHLORIDE, SODIUM LACTATE, POTASSIUM CHLORIDE, CALCIUM CHLORIDE 600; 310; 30; 20 MG/100ML; MG/100ML; MG/100ML; MG/100ML
INJECTION, SOLUTION INTRAVENOUS CONTINUOUS PRN
Status: DISCONTINUED | OUTPATIENT
Start: 2025-03-14 | End: 2025-03-14 | Stop reason: SURG

## 2025-03-14 RX ORDER — ACETAMINOPHEN 500 MG
1000 TABLET ORAL ONCE
Status: DISCONTINUED | OUTPATIENT
Start: 2025-03-14 | End: 2025-03-14 | Stop reason: HOSPADM

## 2025-03-14 RX ORDER — ONDANSETRON 4 MG/1
4 TABLET, FILM COATED ORAL EVERY 6 HOURS PRN
Status: DISCONTINUED | OUTPATIENT
Start: 2025-03-14 | End: 2025-03-19

## 2025-03-14 RX ORDER — DIPHENHYDRAMINE HYDROCHLORIDE 50 MG/ML
12.5 INJECTION, SOLUTION INTRAMUSCULAR; INTRAVENOUS AS NEEDED
Status: DISCONTINUED | OUTPATIENT
Start: 2025-03-14 | End: 2025-03-14 | Stop reason: HOSPADM

## 2025-03-14 RX ORDER — ROCURONIUM BROMIDE 10 MG/ML
INJECTION, SOLUTION INTRAVENOUS AS NEEDED
Status: DISCONTINUED | OUTPATIENT
Start: 2025-03-14 | End: 2025-03-14 | Stop reason: SURG

## 2025-03-14 RX ORDER — HYDROMORPHONE HYDROCHLORIDE 1 MG/ML
0.4 INJECTION, SOLUTION INTRAMUSCULAR; INTRAVENOUS; SUBCUTANEOUS EVERY 5 MIN PRN
Status: DISCONTINUED | OUTPATIENT
Start: 2025-03-14 | End: 2025-03-14 | Stop reason: HOSPADM

## 2025-03-14 RX ADMIN — SODIUM CHLORIDE: 9 INJECTION, SOLUTION INTRAVENOUS at 11:52:00

## 2025-03-14 RX ADMIN — ROCURONIUM BROMIDE 60 MG: 10 INJECTION, SOLUTION INTRAVENOUS at 12:01:00

## 2025-03-14 RX ADMIN — LIDOCAINE HYDROCHLORIDE 50 MG: 10 INJECTION, SOLUTION EPIDURAL; INFILTRATION; INTRACAUDAL; PERINEURAL at 12:01:00

## 2025-03-14 RX ADMIN — SODIUM CHLORIDE: 9 INJECTION, SOLUTION INTRAVENOUS at 13:08:00

## 2025-03-14 RX ADMIN — METRONIDAZOLE 500 MG: 500 INJECTION, SOLUTION INTRAVENOUS at 12:12:00

## 2025-03-14 RX ADMIN — ROCURONIUM BROMIDE 10 MG: 10 INJECTION, SOLUTION INTRAVENOUS at 13:50:00

## 2025-03-14 RX ADMIN — HYDROMORPHONE HYDROCHLORIDE 0.5 MG: 1 INJECTION, SOLUTION INTRAMUSCULAR; INTRAVENOUS; SUBCUTANEOUS at 14:11:00

## 2025-03-14 RX ADMIN — SODIUM CHLORIDE: 9 INJECTION, SOLUTION INTRAVENOUS at 14:42:00

## 2025-03-14 RX ADMIN — MIDAZOLAM HYDROCHLORIDE 2 MG: 1 INJECTION INTRAMUSCULAR; INTRAVENOUS at 11:52:00

## 2025-03-14 RX ADMIN — DEXAMETHASONE SODIUM PHOSPHATE 4 MG: 4 MG/ML VIAL (ML) INJECTION at 12:01:00

## 2025-03-14 RX ADMIN — SODIUM CHLORIDE, SODIUM LACTATE, POTASSIUM CHLORIDE, CALCIUM CHLORIDE: 600; 310; 30; 20 INJECTION, SOLUTION INTRAVENOUS at 11:52:00

## 2025-03-14 RX ADMIN — ONDANSETRON 4 MG: 2 INJECTION INTRAMUSCULAR; INTRAVENOUS at 14:49:00

## 2025-03-14 RX ADMIN — ROCURONIUM BROMIDE 20 MG: 10 INJECTION, SOLUTION INTRAVENOUS at 12:48:00

## 2025-03-14 NOTE — ANESTHESIA PROCEDURE NOTES
Arterial Line    Date/Time: 3/14/2025 12:05 PM    Performed by: Ezra Felix MD  Authorized by: Ezra Felix MD    General Information and Staff    Procedure Start:  3/14/2025 12:05 PM  Procedure End:  3/14/2025 12:05 PM  Anesthesiologist:  Ezra Felix MD  Performed By:  Anesthesiologist  Patient Location:  OR  Indication: continuous blood pressure monitoring and blood sampling needed    Site Identification: real time ultrasound guided and image stored and retrievable    Preanesthetic Checklist: 2 patient identifiers, IV checked, risks and benefits discussed, monitors and equipment checked, pre-op evaluation, timeout performed, anesthesia consent and sterile technique used    Procedure Details    Catheter Size:  20 G  Catheter Length:  1 and 3/4 inch  Catheter Type:  Arrow  Seldinger Technique?: Yes    Laterality:  Left  Site:  Radial artery  Site Prep: chlorhexidine    Line Secured:  Tape and Tegaderm    Assessment    Events: patient tolerated procedure well with no complications      Medications  3/14/2025 12:05 PM      Additional Comments

## 2025-03-14 NOTE — INTERVAL H&P NOTE
There has been no significant change since Dr Be saw patient as documented in EPIC.   Surgery revisted.   To proceed as planned.      Patient seen and examined by MAXWELL Vinson

## 2025-03-14 NOTE — PROGRESS NOTES
Date: 3/13/2025    To: Mark Javier Daryn  : 1975    I hope this letter finds you doing well.  I am writing to inform you of the following:     The biopsies obtained at the time of your recent endoscopic procedure were benign and showed no evidence of infection or malignancy.           I am advising you to undergo repeat colonoscopy in 10 years. We will send you a reminder card when the time of your procedure is near.      Please call the office at [phone] if there are any questions.    Regards,    Keyshawn Marshall M.D.

## 2025-03-14 NOTE — ANESTHESIA POSTPROCEDURE EVALUATION
[facility]    Mark Stearns Patient Status:  Inpatient   Age/Gender 50 year old female MRN [id number]   Location [facility] SURGERY Attending Tomás Be MD   Hosp Day # 0 PCP Prachi Arenas MD       Anesthesia Post-op Note    Resection recurrent GIST tumor with en-bloc partial resection of left diaphragm, partial pancreatectomy, and splenectomy. Cytoreduction multiple jejunal nodules. Left chest tube placement with complex repair left diaphragm. Intraoperative ultrasound. Lysis of adhesions.    Procedure Summary       Date: 03/14/25 Room / Location:  MAIN OR 10 / [facility] MAIN OR    Anesthesia Start: 1152 Anesthesia Stop: 1525    Procedure: Resection recurrent GIST tumor with en-bloc partial resection of left diaphragm, partial pancreatectomy, and splenectomy. Cytoreduction multiple jejunal nodules. Left chest tube placement with complex repair left diaphragm. Intraoperative ultrasound. Lysis of adhesions. Diagnosis:       Gastrointestinal stromal tumor (GIST) (HCC)      (Gastrointestinal stromal tumor (GIST) (HCC) [C49.A0])    Surgeons: Tomás Be MD Anesthesiologist: Ezra Felix MD    Anesthesia Type: general ASA Status: 3            Anesthesia Type: general    Vitals Value Taken Time   /84 03/14/25 1522   Temp 97 03/14/25 1525   Pulse 84 03/14/25 1525   Resp 19 03/14/25 1525   SpO2 94 % 03/14/25 1525   Vitals shown include unfiled device data.        Patient Location: PACU    Anesthesia Type: general    Airway Patency: patent    Postop Pain Control: adequate    Mental Status: mildly sedated but able to meaningfully participate in the post-anesthesia evaluation    Nausea/Vomiting: none    Cardiopulmonary/Hydration status: stable euvolemic    Complications: no apparent anesthesia related complications    Postop vital signs: stable    Dental Exam: Unchanged from Preop    Patient to be discharged from PACU when criteria met.

## 2025-03-14 NOTE — ANESTHESIA PREPROCEDURE EVALUATION
PRE-OP EVALUATION    Patient Name: Mark Stearns    Admit Diagnosis: Gastrointestinal stromal tumor (GIST) (HCC) [C49.A0]    Pre-op Diagnosis: Gastrointestinal stromal tumor (GIST) (HCC) [C49.A0]    Cytoreductive surgery, possible multi-organ resection    Anesthesia Procedure: Cytoreductive surgery, possible multi-organ resection    Surgeons and Role:     * Tomás Be MD - Primary    Pre-op vitals reviewed.  Temp: 98.6 °F (37 °C)  Pulse: 72  Resp: 18  BP: 137/79  SpO2: 99 %  Body mass index is 27.44 kg/m².    Current medications reviewed.  Hospital Medications:   acetaminophen (Tylenol Extra Strength) tab 1,000 mg  1,000 mg Oral Once    [COMPLETED] heparin (Porcine) 5000 UNIT/ML injection 5,000 Units  5,000 Units Subcutaneous Once    cefTRIAXone (Rocephin) 2 g in sodium chloride 0.9% 100 mL IVPB-ADDV  2 g Intravenous Once    metroNIDAZOLE in sodium chloride 0.79% (Flagyl) 5 mg/mL IVPB premix 500 mg  500 mg Intravenous Once    sodium chloride 0.9% infusion   Intravenous Continuous    sodium chloride 0.9% infusion   Intravenous Continuous    ketamine (Ketalar) 250 mg in sodium chloride 0.9% 250 mL infusion for pain  0.15 mg/kg/hr Intravenous Continuous    metroNIDAZOLE in sodium chloride 0.79% (Flagyl) 5 mg/mL IVPB premix        cefTRIAXone (Rocephin) 2 g injection        sodium chloride 0.9% IVPB        scopolamine (Transderm-Scop) 1 MG/3DAYS patch 1 patch  1 patch Transdermal Q48H       Outpatient Medications:   Prescriptions Prior to Admission[1]    Allergies: Codeine, Opioid analgesics, and Penicillins      Anesthesia Evaluation    Patient summary reviewed.    Anesthetic Complications  (+) history of anesthetic complications  History of: PONV       GI/Hepatic/Renal      (+) GERD          (+) liver disease                 Cardiovascular                  (+) hypertension                                     Endo/Other               (+) anemia                   Pulmonary    Negative pulmonary ROS.                        Neuro/Psych    Negative neuro/psych ROS.                                  Past Surgical History:   Procedure Laterality Date    Bowel resection  08/20/2020    25 tumors resected from small bowel    Colonoscopy N/A 3/11/2025    Procedure: COLONOSCOPY with biopsies;  Surgeon: Keyshawn Marshall MD;  Location:  ENDOSCOPY    Nephrectomy Left 11/2002    partial small bowel resection     Social History     Socioeconomic History    Marital status:    Tobacco Use    Smoking status: Never    Smokeless tobacco: Never   Vaping Use    Vaping status: Never Used   Substance and Sexual Activity    Alcohol use: Yes     Comment: 1X/MONTH    Drug use: Never     History   Drug Use Unknown     Available pre-op labs reviewed.               Airway      Mallampati: III  Mouth opening: 3 FB  TM distance: 4 - 6 cm   Cardiovascular             Dental  Comment: No loose teeth per patient               Pulmonary                     Other findings              ASA: 3   Plan: general  NPO status verified and     Post-procedure pain management plan discussed with surgeon and patient.    Comment: GETA/LMA discussed in detail.  Risk of complications discussed including but not limited to sore throat, cough, PONV discussed. Also, discussed risks including dental injury particularly on any weakened, treated or diseased teeth & pt wishes to proceed  All questions answered.    Plan/risks discussed with: patient  Use of blood product(s) discussed with: patient    Consented to blood products.          Present on Admission:  **None**             [1]   Medications Prior to Admission   Medication Sig Dispense Refill Last Dose/Taking    amLODIPine 5 MG Oral Tab Take 1 tablet (5 mg total) by mouth daily.   3/13/2025 at  8:00 AM    Na Sulfate-K Sulfate-Mg Sulf (SUPREP BOWEL PREP KIT) 17.5-3.13-1.6 GM/177ML Oral Solution Take as directed by physician. 354 mL 0 3/13/2025    neomycin 500 MG Oral Tab Take 2 tablets (1,000 mg total) by  mouth 3 (three) times daily. Take 2 tablets at 2 pm, 3 pm and 10 pm the day before surgery. 6 tablet 0 3/13/2025    metroNIDAZOLE (FLAGYL) 500 MG Oral Tab Take 1 tablet (500 mg total) by mouth 3 (three) times daily. Take 1 tablet at 2 pm, 3 pm and 10 pm the day before surgery. 3 tablet 0 3/13/2025    Omeprazole 40 MG Oral Capsule Delayed Release Take 2 capsules (80 mg total) by mouth daily.   3/12/2025    SUNItinib Malate 25 MG Oral Cap Take 1 capsule (25 mg total) by mouth daily. (Patient not taking: Reported on 3/4/2025)   2/12/2025

## 2025-03-14 NOTE — BRIEF OP NOTE
Pre-Operative Diagnosis:Recurrent/metastatic gastrointestinal stromal tumor     Post-Operative Diagnosis: Recurrent/metastatic gastrointestinal stromal tumor     Procedure Performed:   Resection recurrent GIST tumor with en-bloc partial resection of left diaphragm, partial pancreatectomy, and splenectomy. Cytoreduction multiple jejunal nodules. Left chest tube placement with complex repair left diaphragm. Intraoperative ultrasound. Lysis of adhesions.    Surgeons and Role:     * Tomás Be MD - Primary    Assistant(s):  Surgical Assistant.: Margaret Sanders, [facility]  PA: Clare Clifton PA     Surgical Findings: R0 resection.      Specimen: yes     Estimated Blood Loss: Blood Output: 20 mL (3/14/2025  2:51 PM)    MAXWELL Murcia  3/14/2025  3:00 PM           show

## 2025-03-14 NOTE — CONSULTS
[facility]   [facility]    Medical Consult     Mark Stearns Patient Status:  Inpatient    1975 MRN [id number]   Location [facility] 7NE-A Attending Tomás Be MD   Hosp Day # 0 PCP Prachi Arenas MD     Chief Complaint: recurrent, metastatic gastrointestinal stromal tumor    History of Present Illness: Mark Stearns is a 50 year old female with history of gerd, , hld, IBS, renal cell carcinoma s/p nephrectomy, 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 Patient denies any preoperative positive review of systems. Patient denies any acute issues. Patient pain controlled.     Past Medical History:  Past Medical History:    Abdominal hernia    Abdominal pain    Arthritis    Bloating    Decorative tattoo    Esophageal reflux    Fatigue    Flatulence/gas pain/belching    Gastrointestinal stromal tumor (GIST) (HCC)    followed at [facility], on chemo    Headache disorder    Heartburn    High blood pressure    Hx of motion sickness    IBS (irritable bowel syndrome)    Indigestion    Leaking of urine    Pain with bowel movements    Personal history of antineoplastic chemotherapy    LAST ORAL TREATMENT    PONV (postoperative nausea and vomiting)    Renal cell carcinoma, left (HCC)    Renal disorder    RIGHT KIDNEY ONLY, LEFT KIDNEY REMOVED     Stool incontinence    Stress    Uncomfortable fullness after meals    Visual impairment    GLASSES    Wears glasses    Weight loss        Past Surgical History:   Past Surgical History:   Procedure Laterality Date    Bowel resection  2020    25 tumors resected from small bowel    Colonoscopy N/A  3/11/2025    Procedure: COLONOSCOPY with biopsies;  Surgeon: Keyshawn Marshall MD;  Location:  ENDOSCOPY    Nephrectomy Left 11/2002    partial small bowel resection       Social History:  reports that she has never smoked. She has never used smokeless tobacco. She reports current alcohol use. She reports that she does not use drugs.no illegal drugs, , 2 children, no cane or walker    Family History:   Family History   Problem Relation Age of Onset    Diabetes Mother     Thyroid Disorder Sister     Cancer Son 1        Wilms   Mother living  Father living     Allergies: Allergies[1]    Medications:  Medications Ordered Prior to Encounter[2]    Review of Systems:   A comprehensive 14 point review of systems was completed.    Pertinent positives and negatives noted in the HPI.    Physical Exam:    /78   Pulse 74   Temp 98.4 °F (36.9 °C) (Temporal)   Resp 14   Ht 5' 6\" (1.676 m)   Wt 170 lb (77.1 kg)   LMP 02/26/2025 (Exact Date)   SpO2 93%   BMI 27.44 kg/m²   General: No acute distress. Alert and oriented  HEENT: Normocephalic atraumatic. Moist mucous membranes. EOM-I.  Neck: No JVD. No carotid bruits.  Respiratory: Clear to auscultation bilaterally. No wheezes. No crackles  Cardiovascular: S1, S2. Regular rate and rhythm. No murmurs  Chest and Back: No tenderness or deformity.  Abdomen: Soft, expected post operative tender, nondistended.  No significant bowel sounds. No rebound, guarding  Neurologic: No focal neurological deficits. CNII-XII grossly intact. Sensation and strength intact  Musculoskeletal: Moves all extremities.  Extremities: No edema or tenderness of the LE  Integument: No new rashes or lesions.   Psychiatric: Appropriate mood and affect.      Diagnostic Data:      Labs:  No results for input(s): \"WBC\", \"HGB\", \"MCV\", \"PLT\", \"BAND\", \"INR\" in the last 168 hours.    Invalid input(s): \"LYM#\", \"MONO#\", \"BASOS#\", \"EOSIN#\"    No results for input(s): \"GLU\", \"BUN\", \"CREATSERUM\", \"GFRAA\",  \"GFRNAA\", \"CA\", \"ALB\", \"NA\", \"K\", \"CL\", \"CO2\", \"ALKPHO\", \"AST\", \"ALT\", \"BILT\", \"TP\" in the last 168 hours.    CrCl cannot be calculated (No successful lab value found.).    No results for input(s): \"PTP\", \"INR\" in the last 168 hours.    No results for input(s): \"TROP\", \"CK\" in the last 168 hours.    Imaging: Imaging data reviewed in Epic.      ASSESSMENT / PLAN:   50 year old female with history of gerd, hld, IBS, renal cell carcinoma s/p nephrectomy, 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     Recurrent metastatic gastrointestinal stromal tumor   -POD # 0 s/p resection of recurrent/metastatic gastrointestinal stromal tumor with en block partial resection of left diaphragm, partial distal pancreatectomy, splenectomy, cytoreduction of multiple jejunal nodes, complex repair of left diaphragm, left chest tube and lysis of adhesions.   --surgical oncology following  -diet per surg onc    Post Operative Pain  -acetaminophen po atc  -hydromorphone iv prn  -ketamine    Gerd  -famotidine     Quality:  DVT Prophylaxis: scd, lovenox  CODE status:   Freed:     Plan of care discussed with patient and staff    Dispo: no discharge    Jose Buitrago MD  [facility] Hospitalist  [phone]                           [1]   Allergies  Allergen Reactions    Codeine ITCHING, SHORTNESS OF BREATH, SWELLING and TONGUE SWELLING    Opioid Analgesics ITCHING, OTHER (SEE COMMENTS) and SHORTNESS OF BREATH    Penicillins RASH, SHORTNESS OF BREATH, SWELLING and TONGUE SWELLING   [2]   No current facility-administered medications on file prior to encounter.     Current Outpatient Medications on File Prior to Encounter    Medication Sig Dispense Refill    neomycin 500 MG Oral Tab Take 2 tablets (1,000 mg total) by mouth 3 (three) times daily. Take 2 tablets at 2 pm, 3 pm and 10 pm the day before surgery. 6 tablet 0    metroNIDAZOLE (FLAGYL) 500 MG Oral Tab Take 1 tablet (500 mg total) by mouth 3 (three) times daily. Take 1 tablet at 2 pm, 3 pm and 10 pm the day before surgery. 3 tablet 0    Omeprazole 40 MG Oral Capsule Delayed Release Take 2 capsules (80 mg total) by mouth daily.

## 2025-03-14 NOTE — ANESTHESIA PROCEDURE NOTES
Airway  Date/Time: 3/14/2025 12:01 PM  Urgency: elective      General Information and Staff    Patient location during procedure: OR  Anesthesiologist: Ezra Felix MD  Performed: anesthesiologist   Performed by: Ezra Felix MD  Authorized by: Ezra Felix MD      Indications and Patient Condition  Indications for airway management: anesthesia  Sedation level: deep  Preoxygenated: yes  Patient position: sniffing  Mask difficulty assessment: 1 - vent by mask    Final Airway Details  Final airway type: endotracheal airway      Successful airway: ETT  Cuffed: yes   Successful intubation technique: Video laryngoscopy  Endotracheal tube insertion site: oral  Blade: GlideScope  Blade size: #3  ETT size (mm): 7.0    Placement verified by: capnometry   Cuff volume (mL): 8  Measured from: lips  ETT to lips (cm): 22  Number of attempts at approach: 1    Additional Comments  atraumatic

## 2025-03-14 NOTE — ANESTHESIA PROCEDURE NOTES
Regional Block    Date/Time: 3/14/2025 12:10 PM    Performed by: Ezra Felix MD  Authorized by: Ezra Felix MD      General Information and Staff    Start Time:  3/14/2025 12:10 PM  End Time:  3/14/2025 12:15 PM  Anesthesiologist:  Ezra Felix MD  Performed by:  Anesthesiologist  Patient Location:  OR    Block Placement: Post Induction  Site Identification: real time ultrasound guided and image stored and retrievable    Block site/laterality marked before start: site marked  Reason for Block: at surgeon's request and post-op pain management    Preanesthetic Checklist: 2 patient identifers, IV checked, risks and benefits discussed, monitors and equipment checked, pre-op evaluation, timeout performed, anesthesia consent, sterile technique used, no prohibitive neurological deficits and no local skin infection at insertion site      Procedure Details    Patient Position:  Supine  Prep: ChloraPrep    Monitoring:  Cardiac monitor, continuous pulse ox, blood pressure cuff and heart rate  Block Type:  TAP  Laterality:  Bilateral  Injection Technique:  Single-shot    Needle    Needle Type:  Short-bevel and echogenic  Needle Gauge:  21 G  Needle Length:  100 mm  Needle Localization:  Ultrasound guidance  Reason for Ultrasound Use: appropriate spread of the medication was noted in real time and no ultrasound evidence of intravascular and/or intraneural injection            Assessment    Injection Assessment:  Good spread noted, negative resistance, negative aspiration for heme, incremental injection and low pressure  Heart Rate Change: No    - Patient tolerated block procedure well without evidence of immediate block related complications.     Medications  3/14/2025 12:10 PM      Additional Comments    Medication:  Bupivacaine 0.25% 50 ml

## 2025-03-15 ENCOUNTER — APPOINTMENT (OUTPATIENT)
Dept: GENERAL RADIOLOGY | Facility: HOSPITAL | Age: 50
End: 2025-03-15
Attending: SURGERY
Payer: COMMERCIAL

## 2025-03-15 ENCOUNTER — APPOINTMENT (OUTPATIENT)
Dept: GENERAL RADIOLOGY | Facility: HOSPITAL | Age: 50
DRG: 330 | End: 2025-03-15
Attending: SURGERY
Payer: COMMERCIAL

## 2025-03-15 LAB
ALBUMIN SERPL-MCNC: 4.1 G/DL (ref 3.2–4.8)
ALBUMIN/GLOB SERPL: 1.6 {RATIO} (ref 1–2)
ALP LIVER SERPL-CCNC: 53 U/L
ALT SERPL-CCNC: 48 U/L
AMYLASE PRT-CCNC: 373 U/L
ANION GAP SERPL CALC-SCNC: 5 MMOL/L (ref 0–18)
AST SERPL-CCNC: 51 U/L (ref ?–34)
BILIRUB SERPL-MCNC: 0.4 MG/DL (ref 0.3–1.2)
BUN BLD-MCNC: 6 MG/DL (ref 9–23)
CALCIUM BLD-MCNC: 8.5 MG/DL (ref 8.7–10.6)
CHLORIDE SERPL-SCNC: 108 MMOL/L (ref 98–112)
CO2 SERPL-SCNC: 27 MMOL/L (ref 21–32)
CREAT BLD-MCNC: 0.76 MG/DL
EGFRCR SERPLBLD CKD-EPI 2021: 97 ML/MIN/1.73M2 (ref 60–?)
ERYTHROCYTE [DISTWIDTH] IN BLOOD BY AUTOMATED COUNT: 13.1 %
GLOBULIN PLAS-MCNC: 2.5 G/DL (ref 2–3.5)
GLUCOSE BLD-MCNC: 111 MG/DL (ref 70–99)
HCT VFR BLD AUTO: 34.9 %
HGB BLD-MCNC: 11.3 G/DL
MAGNESIUM SERPL-MCNC: 1.8 MG/DL (ref 1.6–2.6)
MCH RBC QN AUTO: 31.7 PG (ref 26–34)
MCHC RBC AUTO-ENTMCNC: 32.4 G/DL (ref 31–37)
MCV RBC AUTO: 98 FL
OSMOLALITY SERPL CALC.SUM OF ELEC: 288 MOSM/KG (ref 275–295)
PHOSPHATE SERPL-MCNC: 3.3 MG/DL (ref 2.4–5.1)
PLATELET # BLD AUTO: 254 10(3)UL (ref 150–450)
POTASSIUM SERPL-SCNC: 4.3 MMOL/L (ref 3.5–5.1)
PROT SERPL-MCNC: 6.6 G/DL (ref 5.7–8.2)
RBC # BLD AUTO: 3.56 X10(6)UL
SODIUM SERPL-SCNC: 140 MMOL/L (ref 136–145)
WBC # BLD AUTO: 16.1 X10(3) UL (ref 4–11)

## 2025-03-15 PROCEDURE — 85027 COMPLETE CBC AUTOMATED: CPT | Performed by: PHYSICIAN ASSISTANT

## 2025-03-15 PROCEDURE — 83735 ASSAY OF MAGNESIUM: CPT | Performed by: PHYSICIAN ASSISTANT

## 2025-03-15 PROCEDURE — 84100 ASSAY OF PHOSPHORUS: CPT | Performed by: PHYSICIAN ASSISTANT

## 2025-03-15 PROCEDURE — 97530 THERAPEUTIC ACTIVITIES: CPT

## 2025-03-15 PROCEDURE — 82150 ASSAY OF AMYLASE: CPT | Performed by: PHYSICIAN ASSISTANT

## 2025-03-15 PROCEDURE — 71045 X-RAY EXAM CHEST 1 VIEW: CPT | Performed by: SURGERY

## 2025-03-15 PROCEDURE — 80053 COMPREHEN METABOLIC PANEL: CPT | Performed by: PHYSICIAN ASSISTANT

## 2025-03-15 PROCEDURE — 97161 PT EVAL LOW COMPLEX 20 MIN: CPT

## 2025-03-15 PROCEDURE — S0028 INJECTION, FAMOTIDINE, 20 MG: HCPCS | Performed by: PHYSICIAN ASSISTANT

## 2025-03-15 RX ORDER — MAGNESIUM OXIDE 400 MG/1
400 TABLET ORAL ONCE
Status: COMPLETED | OUTPATIENT
Start: 2025-03-15 | End: 2025-03-15

## 2025-03-15 RX ORDER — OXYCODONE HYDROCHLORIDE 5 MG/1
5 TABLET ORAL EVERY 4 HOURS PRN
Status: DISCONTINUED | OUTPATIENT
Start: 2025-03-15 | End: 2025-03-16

## 2025-03-15 NOTE — PROGRESS NOTES
[facility]  Progress Note    Mark Stearns Patient Status:  Inpatient    1975 MRN [id number]   Location [facility] 7NE-A Attending Tomás Be MD   Hosp Day # 1 PCP Prachi Arenas MD     POD #1 s/p resection of recurrent/metastatic gastrointestinal stromal tumor with en bloc partial resection of left diaphragm, partial distal pancreatectomy, and splenectomy with CRS  Subjective:      Objective/Physical Exam:  General: Alert, orientated x3.  Cooperative.  No apparent distress.  Vital Signs:  Blood pressure 125/78, pulse 82, temperature 97.4 °F (36.3 °C), temperature source Oral, resp. rate 19, height 1.676 m (5' 6\"), weight 77.1 kg (170 lb), last menstrual period 2025, SpO2 95%.  Wt Readings from Last 3 Encounters:   25 77.1 kg (170 lb)   25 77.1 kg (170 lb)   25 77.6 kg (171 lb)     Lungs: No respiratory distress.  Cardiac: Regular rate and rhythm.   Abdomen:  Soft, *** distended, *** tender, with no rebound or guarding.  No peritoneal signs.   Extremities:  No lower extremity edema noted.    Incision: Clean, dry, intact, no erythema  Drain: ***    Intake/Output:    Intake/Output Summary (Last 24 hours) at 3/15/2025 0745  Last data filed at 3/15/2025 0737  Gross per 24 hour   Intake 3527.4 ml   Output 1405 ml   Net 2122.4 ml     I/O last 3 completed shifts:  In: 3524.4 [I.V.:3324.4; IV PIGGYBACK:200]  Out: 1405 [Urine:1275; Drains:75; Blood:20; Chest Tube:35]  I/O this shift:  In: 3 [I.V.:3]  Out: -     Medications:    magnesium oxide  400 mg Oral Once    scopolamine  1 patch Transdermal Q48H    enoxaparin  40 mg Subcutaneous Daily    famotidine  20 mg Oral BID    Or    famotidine  20 mg Intravenous BID    acetaminophen  1,000 mg Oral q6h       Labs:  Lab Results   Component Value Date    WBC 16.1 03/15/2025    HGB 11.3 03/15/2025    HCT 34.9 03/15/2025    .0 03/15/2025     Lab Results   Component Value Date     03/15/2025    K 4.3 03/15/2025    CL  108 03/15/2025    CO2 27.0 03/15/2025    BUN 6 03/15/2025    CREATSERUM 0.76 03/15/2025     03/15/2025    CA 8.5 03/15/2025    ALKPHO 53 03/15/2025    ALT 48 03/15/2025    AST 51 03/15/2025    BILT 0.4 03/15/2025    ALB 4.1 03/15/2025    TP 6.6 03/15/2025     No results found for: \"PT\", \"INR\"      Assessment  Patient Active Problem List   Diagnosis    Gastrointestinal stromal tumor (GIST) (HCC)    GERD (gastroesophageal reflux disease)    H/O right nephrectomy    Seasonal allergies    Secondary malignancy of parietal peritoneum (HCC)    Diarrhea    GIST (gastrointestinal stromal tumor), malignant (HCC)    Malignant neoplasm metastatic to liver (HCC)    Primary hypertension    Renal cell carcinoma (HCC)    Metastatic adenocarcinoma (HCC)    Colon cancer screening    GIST, malignant (HCC)         Subjective:  POD #1 s/p resection of recurrent/metastatic gastrointestinal stromal tumor with en bloc partial resection of left diaphragm, partial distal pancreatectomy, and splenectomy with cytoreduction of multiple jejunal nodule.    Chest x-ray completed 3/15/2025 negative for pneumothorax or significant pleural fluid findings.       Plan:  - No acute surgical issues  - Discontinue posey  - Continue NG tube  - Diet: Clear  - Pain: Continue PCA, ketamine, scheduled Tylenol  - GI prophylaxis famotidine  - DVT prophylaxis heparin subcutaneous  - PT/OT to evaluate and treat  - Appreciate medical team recommendations  - Encourage IS and ambulation  - Replace electrolytes per protocol  Dispo: CTU7      Quality:  DVT Mechanical Prophylaxis:   SCDs, Early ambuation  DVT Pharmacologic Prophylaxis   Medication    enoxaparin (Lovenox) 40 MG/0.4ML SUBQ injection 40 mg                Code Status: Not on file  Posey: Posey catheter in place  Posey Duration (in days): 2  Central line:    NOELLE:     Discussed with Dr. Indiana De Los Santos APRN  Department of Surgical Oncology  [address]  [place]  [address]  [facility]  [address] [place] [address]  T: [phone]  F: [phone]        Marquita De Los Santos, APRN  3/15/2025  7:45 AM

## 2025-03-15 NOTE — PROGRESS NOTES
[facility] Hospitalist Progress Note                                                                     [facility]   part of [facility]      Mark Stearns  2/24/1975    SUBJECTIVE: no chest pain , palpitations, shortness of breath, cough, nausea, vomiting. PT with postoperative abdominal pain. Pain from chest tube.     OBJECTIVE:  Temp:  [97.3 °F (36.3 °C)-98.6 °F (37 °C)] 97.4 °F (36.3 °C)  Pulse:  [72-91] 82  Resp:  [14-26] 19  BP: (125-147)/(74-93) 125/78  SpO2:  [92 %-99 %] 95 %  Exam  Gen: No acute distress, alert and oriented   Pulm: Lungs clear bilaterally, normal respiratory effort, no crackles, no wheezing,.   CV: Heart with regular rate and rhythm, no murmur.   Abd: Abdomen soft, generalized post operative tenderness, nondistended, hypoactive bowel sounds present  MSK: NO significant pitting edema or tenderness of the LE  Skin: no new rashes or lesions    Labs:   Recent Labs   Lab 03/15/25  0540   WBC 16.1*   HGB 11.3*   MCV 98.0   .0       Recent Labs   Lab 03/15/25  0540      K 4.3      CO2 27.0   BUN 6*   CREATSERUM 0.76   CA 8.5*   MG 1.8   PHOS 3.3   *       Recent Labs   Lab 03/15/25  0540   ALT 48   AST 51*   ALB 4.1       No results for input(s): \"PGLU\" in the last 168 hours.    Meds:   Scheduled:    magnesium oxide  400 mg Oral Once    scopolamine  1 patch Transdermal Q48H    enoxaparin  40 mg Subcutaneous Daily    famotidine  20 mg Oral BID    Or    famotidine  20 mg Intravenous BID    acetaminophen  1,000 mg Oral q6h     Continuous Infusions:    sodium chloride 20 mL/hr at 03/14/25 1152    sodium chloride 10 mL/hr at 03/14/25 1545    ketamine (Ketalar) 250 mg in sodium chloride 0.9% 250 mL infusion for pain Stopped (03/15/25 0800)    lactated ringers 75 mL/hr at 03/14/25 2053    HYDROmorphone in sodium chloride 0.9%       PRN:   ondansetron **OR** ondansetron    naloxone    diphenhydrAMINE    influenza virus  vaccine PF    diphenhydrAMINE **OR** diphenhydrAMINE    ASSESSMENT / PLAN:   50 year old female with history of gerd, hld, IBS, renal cell carcinoma s/p nephrectomy, 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      Recurrent metastatic gastrointestinal stromal tumor   -POD # 1 s/p resection of recurrent/metastatic gastrointestinal stromal tumor with en block partial resection of left diaphragm, partial distal pancreatectomy, splenectomy, cytoreduction of multiple jejunal nodes, complex repair of left diaphragm, left chest tube and lysis of adhesions.   --surgical oncology following  -diet per surg onc  -encourage ambulation      Post Operative Pain  -acetaminophen po atc  -hydromorphone iv prn  -ketamine--> hold      Gerd  -famotidine      Quality:  DVT Prophylaxis: scd, lovenox  CODE status:   Freed:      Plan of care discussed with patient and staff     Dispo: no discharge     Jose Buitrago MD  [facility] Hospitalist  [phone]

## 2025-03-15 NOTE — PLAN OF CARE
NURSING ADMISSION NOTE    Patient admitted via Cart to 7605 around 1700  Oriented to room.  Safety precautions initiated.  Bed in low position.  Call light in reach.    A&Ox4, VSS, bedrest   Ketamine, PCA dilaudid and tylenol given for pain   Tolerating diet   Freed intact   Admission navigator completed   Hospitalist consulted- Dr. Buitrago aware of consult   Midline incision- intact, old drainage present   L chest tube to wall suction   L JUAN DIEGO intact   Patient and family updated on POC   All questions answered   Call light within reach

## 2025-03-15 NOTE — PROGRESS NOTES
POD#1    Blood pressure 122/84, pulse 80, temperature 98.5 °F (36.9 °C), temperature source Oral, resp. rate 22, height 1.676 m (5' 6\"), weight 77.1 kg (170 lb), last menstrual period 02/26/2025, SpO2 94%.    Intake/Output Summary (Last 24 hours) at 3/15/2025 1009  Last data filed at 3/15/2025 0930  Gross per 24 hour   Intake 3618 ml   Output 1405 ml   Net 2213 ml     Lab Results   Component Value Date    WBC 16.1 03/15/2025    HGB 11.3 03/15/2025    HCT 34.9 03/15/2025    .0 03/15/2025    CREATSERUM 0.76 03/15/2025    BUN 6 03/15/2025     03/15/2025    K 4.3 03/15/2025     03/15/2025    CO2 27.0 03/15/2025     03/15/2025    CA 8.5 03/15/2025    ALB 4.1 03/15/2025    ALKPHO 53 03/15/2025    BILT 0.4 03/15/2025    TP 6.6 03/15/2025    AST 51 03/15/2025    ALT 48 03/15/2025    MG 1.8 03/15/2025    PHOS 3.3 03/15/2025      Amylase Peritoneal Fld  U/L 373     Constitutional:  NAD.   Eyes: non-icteric.    Abdomen: Soft, non-tender, non-distended. Dressings OK. Chest tube no leak. Drain serous sang  Musculoskeletal: no edema.   Skin: Inspection and palpation: no jaundice.   -Doing well without acute issues.  -Surgery discussed with patient.  -Ketamine stopped.  Pain controlled with PCA and Tylenol.  Add Oxy  -DVT/ulcer prophylaxis  -I-S  -Physical therapy; ambulate  -Decrease IVF  -Advance diet  -Leukocytosis anticipated secondary to splenectomy; will need postsplenectomy vaccination prior to discharge.  -Chest tube to waterseal.  -Drain amylase okay; recheck postop day 3.  Tomás Be MD

## 2025-03-15 NOTE — PLAN OF CARE
Received pt at 1930  Pt AOx4, NSR, RA, VSS  Spouse at bedside NOC  Ketamine gtt   Dilaudid PCA  IVF  Midline incision w/ old drainage  JUAN DIEGO w/ ss output  CT -20cm w/ s/ss output  UO adequate  D/c Planning: PT/OT to see  Call light within reach.  Needs currently met

## 2025-03-15 NOTE — OPERATIVE REPORT
[facility]    PATIENT'S NAME: HINA LOBATO   ATTENDING PHYSICIAN: Tomás Be MD   OPERATING PHYSICIAN: Tomás eB MD   PATIENT ACCOUNT#:   [id number]    LOCATION:  [address] [place] [address]  MEDICAL RECORD #:   [id number]       YOB: 1975  ADMISSION DATE:       03/14/2025      OPERATION DATE:  03/14/2025    OPERATIVE REPORT      PREOPERATIVE DIAGNOSIS:  Recurrent/metastatic gastrointestinal stromal tumor.    POSTOPERATIVE DIAGNOSIS:  Recurrent/metastatic gastrointestinal stromal tumor.    PROCEDURE:    1.   Resection of recurrent/metastatic gastrointestinal stromal tumor with en bloc partial resection of left diaphragm, partial distal pancreatectomy, and splenectomy.    2.   Cytoreduction of multiple jejunal nodules.    3.   Complex repair of left diaphragm.    4.   Left chest tube placement.  5.   Intraoperative ultrasound.  6.   Lysis of adhesions.    ASSISTANT:  MARINA Juan and MAXWELL Murcia.    ANESTHESIA:  General.     FINDINGS:  About 3 cm tumor in the left upper quadrant involving the diaphragm requiring full-thickness resection.  There were multiple surface nodules on the spleen that required the en bloc resection.  Multiple jejunal nodules 8 in total, largest about 4 mm.  R0 resection achieved.     The surgery was more difficult than usual given multiple prior surgeries and intra-abdominal adhesions.    OPERATIVE TECHNIQUE:  The patient was brought to the operating room and was placed in supine position.  She was given general anesthesia by the anesthesiology service.  Sequential compression boots were placed.  The patient received preoperative antibiotic prophylaxis.  She was prepped and draped in the normal sterile fashion.  An upper midline incision extending to below the umbilicus was made and deepened.  The fascia was incised.  The peritoneal cavity was entered.  There were moderate adhesions that were taken down and lysed.  I then placed a Amador  retractor to help in exposure.  Complete lysis of adhesion was undertaken, particularly of small bowel.  The omentum itself was unremarkable.  The small bowel involvement included mesenteric proximal jejunal nodule that was resected, and multiple serosal jejunal nodules, 7 in total, and thus 8 total nodules within the proximal jejunum that were sent to Pathology.  These varied in size from 2 to 4 mm.  No other peritoneal nodularity was noted except for left upper quadrant mass as will be described.  Of note, on prior imaging, the patient has had right rectus muscle tumor subcentimeter that I was not able to palpate.  I did perform ultrasound of the area of concern, and no masses were noted.    Following placement of a Amador retractors, surface nodularity on the spleen was noted.  The spleen was mobilized, and the splenic flexure was taken down.  Tumor about 3 cm corresponding to preoperative imaging was noted posterior to the spleen involving the diaphragm.  The patient has had a prior left nephrectomy.  A full-thickness diaphragmatic resection was undertaken, and the spleen was mobilized.  A plane was then entered posteriorly, and the tail of the pancreas was in proximity and required resection itself.  Both the tail of the pancreas with both splenic vein and artery were then resected using vascular load stapler.  Circumferential dissection was then completed, and the specimen was excised to include tumor measured about 3 cm with portion of the left diaphragm, tail of pancreas, and spleen.  The specimen was oriented and sent to Pathology for permanent section evaluation.  Hemostasis was achieved and maintained.  Complex repair of the diaphragmatic defect, given large defect size out of about 7 cm with the diaphragmatic loss, was performed using 0 Prolene suture placed in a figure-of-eight fashion to surrounding structures and musculature.  The repair was inspected for leak via Valsalva and none was noted.   Subsequently, using sterile gloves, I placed a 24-Bengali chest tube within the left thoracic cavity.  It was stitched in place using 0 silk suture.    At this point, complete cytoreduction with R0 resection was achieved.  A 15 Ezra drain was placed in the vicinity of the divided pancreas and the repaired the diaphragm.  It was stitched in place using 3-0 nylon suture.  Seprafilm was applied.  The fascia was then reapproximated using #1 PDS.  The skin was stapled.  Sterile dressings were then applied.    The patient tolerated the procedure well without immediate complications.  She was extubated in the operating room and was sent in stable condition to recovery room.  Counts were correct.  I was present throughout the procedure.     Dictated By Tomás Be MD  d: 03/14/2025 21:07:04  t: 03/14/2025 21:49:48  Job [id number]  [facility]/

## 2025-03-15 NOTE — PHYSICAL THERAPY NOTE
PHYSICAL THERAPY EVALUATION - INPATIENT     Room Number: 7605/7605-A  Evaluation Date: 3/15/2025  Type of Evaluation: Initial  Physician Order: PT Eval and Treat    Presenting Problem: S/p 3/15 Resection recurrent GIST tumor with en-bloc partial resection of left diaphragm, partial pancreatectomy, and splenectomy.  Co-Morbidities : GERD, HTN, Diarrhea  Reason for Therapy: Mobility Dysfunction and Discharge Planning    PHYSICAL THERAPY ASSESSMENT   Patient is a 50 year old female admitted 3/14/2025 for S/p 3/15 Resection recurrent GIST tumor with en-bloc partial resection of left diaphragm, partial pancreatectomy, and splenectomy.  Prior to admission, patient's baseline is Mod I.  Patient is currently functioning near baseline with bed mobility, transfers, and gait.  Patient is requiring contact guard assist as a result of the following impairments: decreased endurance/aerobic capacity, pain, impaired Gait and standing balance, and decreased muscular endurance.  Physical Therapy will continue to follow for duration of hospitalization.    Patient will benefit from continued skilled PT Services for duration of hospitalization, however, given the patient is functioning near baseline level do not anticipate skilled therapy needs at discharge .    PLAN DURING HOSPITALIZATION  Nursing Mobility Recommendation : 1 Assist  PT Device Recommendation: Rolling walker  PT Treatment Plan: Bed mobility, Body mechanics, Gait training, Strengthening, Stair training, Transfer training, Balance training  Rehab Potential : Good  Frequency (Obs): 3-5x/week     CURRENT GOALS    Goal #1 Patient is able to demonstrate supine - sit EOB @ level: supervision     Goal #2 Patient is able to demonstrate transfers EOB to/from BSC at assistance level: supervision     Goal #3 Patient is able to ambulate 150 feet with assist device: walker - rolling at assistance level: supervision     Goal #4 Pt will perform 6 steps with bilateral railings with Sup    Goal #5    Goal #6    Goal Comments: Goals established on 3/15/2025      PHYSICAL THERAPY MEDICAL/SOCIAL HISTORY  History related to current admission: Patient is a 50 year old female admitted on 3/14/2025 from Home for S/p 3/15 Resection recurrent GIST tumor with en-bloc partial resection of left diaphragm, partial pancreatectomy, and splenectomy..    HOME SITUATION  Type of Home: Condo  Home Layout: One level                     Lives With: Spouse    Drives: Yes          Prior Level of [place]: Pt states that she lives in a house with her spouse. Pt reports that she is IND with all ADLs and Gait.     SUBJECTIVE  \"My abs hurt\"    OBJECTIVE  Precautions: JUAN DIEGO tube, Chest tube to water seal, Abdominal protective strategies  Fall Risk: High fall risk    WEIGHT BEARING RESTRICTION     PAIN ASSESSMENT  Ratin  Location: Pt reports no pain       COGNITION  Overall Cognitive Status:  WFL - within functional limits    RANGE OF MOTION AND STRENGTH ASSESSMENT  Upper extremity ROM and strength are within functional limits     Lower extremity ROM is within functional limits     Lower extremity strength is within functional limits     BALANCE  Static Sitting: Fair -  Dynamic Sitting: Fair -  Static Standing: Fair -  Dynamic Standing: Fair -    ADDITIONAL TESTS                                    ACTIVITY TOLERANCE                         O2 WALK       NEUROLOGICAL FINDINGS                        AM-PAC '6-Clicks' INPATIENT SHORT FORM - BASIC MOBILITY  How much difficulty does the patient currently have...  Patient Difficulty: Turning over in bed (including adjusting bedclothes, sheets and blankets)?: A Little   Patient Difficulty: Sitting down on and standing up from a chair with arms (e.g., wheelchair, bedside commode, etc.): A Little   Patient Difficulty: Moving from lying on back to sitting on the side of the bed?: A Little   How much help from another person does the patient currently need...   Help from Another:  Moving to and from a bed to a chair (including a wheelchair)?: A Little   Help from Another: Need to walk in hospital room?: A Little   Help from Another: Climbing 3-5 steps with a railing?: A Little     AM-PAC Score:  Raw Score: 18   Approx Degree of Impairment: 46.58%   Standardized Score (AM-PAC Scale): 43.63   CMS Modifier (G-Code): CK    FUNCTIONAL ABILITY STATUS  Gait Assessment   Functional Mobility/Gait Assessment  Gait Assistance: Contact guard assist  Distance (ft): 30  Assistive Device: Rolling walker  Pattern: Shuffle    Skilled Therapy Provided     Bed Mobility:  Rolling: Min A  Supine to sit: Min A   Sit to supine: NT     Transfer Mobility:  Sit to stand: CGA   Stand to sit: CGA  Gait = CGA 30ft using RW    Therapist's Comments: Pt required multiple standing rest breaks secondary to increase pain. Pt was observed with no LOB when ambulating. Encourage pt to sit up in the chair multiple times throughout the day as well as ambulate multiple throughout the day.     Exercise/Education Provided:  Bed mobility  Body mechanics  Gait training  Transfer training    Patient End of Session: Up in chair, Needs met, Call light within reach, RN aware of session/findings, All patient questions and concerns addressed, Family present      Patient Evaluation Complexity Level:  History Moderate - 1 or 2 personal factors and/or co-morbidities   Examination of body systems Low -  addressing 1-2 elements   Clinical Presentation Low- Stable   Clinical Decision Making Low Complexity       PT Session Time: 23 minutes  Therapeutic Activity: 15 minutes

## 2025-03-16 ENCOUNTER — APPOINTMENT (OUTPATIENT)
Dept: GENERAL RADIOLOGY | Facility: HOSPITAL | Age: 50
End: 2025-03-16
Attending: SURGERY
Payer: COMMERCIAL

## 2025-03-16 ENCOUNTER — APPOINTMENT (OUTPATIENT)
Dept: GENERAL RADIOLOGY | Facility: HOSPITAL | Age: 50
DRG: 330 | End: 2025-03-16
Attending: SURGERY
Payer: COMMERCIAL

## 2025-03-16 LAB
ANION GAP SERPL CALC-SCNC: 7 MMOL/L (ref 0–18)
BASOPHILS # BLD AUTO: 0.05 X10(3) UL (ref 0–0.2)
BASOPHILS NFR BLD AUTO: 0.2 %
BUN BLD-MCNC: <5 MG/DL (ref 9–23)
CALCIUM BLD-MCNC: 8.8 MG/DL (ref 8.7–10.6)
CHLORIDE SERPL-SCNC: 105 MMOL/L (ref 98–112)
CO2 SERPL-SCNC: 28 MMOL/L (ref 21–32)
CREAT BLD-MCNC: 0.71 MG/DL
EGFRCR SERPLBLD CKD-EPI 2021: 105 ML/MIN/1.73M2 (ref 60–?)
EOSINOPHIL # BLD AUTO: 0.24 X10(3) UL (ref 0–0.7)
EOSINOPHIL NFR BLD AUTO: 1.1 %
ERYTHROCYTE [DISTWIDTH] IN BLOOD BY AUTOMATED COUNT: 13.2 %
GLUCOSE BLD-MCNC: 119 MG/DL (ref 70–99)
HCT VFR BLD AUTO: 32.3 %
HGB BLD-MCNC: 10.8 G/DL
IMM GRANULOCYTES # BLD AUTO: 0.14 X10(3) UL (ref 0–1)
IMM GRANULOCYTES NFR BLD: 0.7 %
LYMPHOCYTES # BLD AUTO: 1.31 X10(3) UL (ref 1–4)
LYMPHOCYTES NFR BLD AUTO: 6.2 %
MAGNESIUM SERPL-MCNC: 1.9 MG/DL (ref 1.6–2.6)
MCH RBC QN AUTO: 32.1 PG (ref 26–34)
MCHC RBC AUTO-ENTMCNC: 33.4 G/DL (ref 31–37)
MCV RBC AUTO: 96.1 FL
MONOCYTES # BLD AUTO: 1.53 X10(3) UL (ref 0.1–1)
MONOCYTES NFR BLD AUTO: 7.2 %
NEUTROPHILS # BLD AUTO: 17.86 X10 (3) UL (ref 1.5–7.7)
NEUTROPHILS # BLD AUTO: 17.86 X10(3) UL (ref 1.5–7.7)
NEUTROPHILS NFR BLD AUTO: 84.6 %
PHOSPHATE SERPL-MCNC: 2.2 MG/DL (ref 2.4–5.1)
PLATELET # BLD AUTO: 274 10(3)UL (ref 150–450)
POTASSIUM SERPL-SCNC: 3.9 MMOL/L (ref 3.5–5.1)
RBC # BLD AUTO: 3.36 X10(6)UL
SODIUM SERPL-SCNC: 140 MMOL/L (ref 136–145)
WBC # BLD AUTO: 21.1 X10(3) UL (ref 4–11)

## 2025-03-16 PROCEDURE — 80048 BASIC METABOLIC PNL TOTAL CA: CPT

## 2025-03-16 PROCEDURE — 84100 ASSAY OF PHOSPHORUS: CPT

## 2025-03-16 PROCEDURE — 71045 X-RAY EXAM CHEST 1 VIEW: CPT | Performed by: SURGERY

## 2025-03-16 PROCEDURE — 83735 ASSAY OF MAGNESIUM: CPT | Performed by: SURGERY

## 2025-03-16 PROCEDURE — 85025 COMPLETE CBC W/AUTO DIFF WBC: CPT

## 2025-03-16 RX ORDER — METOCLOPRAMIDE HYDROCHLORIDE 5 MG/ML
10 INJECTION INTRAMUSCULAR; INTRAVENOUS
Status: DISCONTINUED | OUTPATIENT
Start: 2025-03-16 | End: 2025-03-16

## 2025-03-16 RX ORDER — OXYCODONE HYDROCHLORIDE 5 MG/1
5 TABLET ORAL EVERY 4 HOURS PRN
Status: DISCONTINUED | OUTPATIENT
Start: 2025-03-16 | End: 2025-03-19

## 2025-03-16 RX ORDER — OXYCODONE HYDROCHLORIDE 10 MG/1
10 TABLET ORAL EVERY 4 HOURS PRN
Status: DISCONTINUED | OUTPATIENT
Start: 2025-03-16 | End: 2025-03-19

## 2025-03-16 RX ORDER — METOCLOPRAMIDE HYDROCHLORIDE 5 MG/ML
10 INJECTION INTRAMUSCULAR; INTRAVENOUS EVERY 4 HOURS PRN
Status: DISCONTINUED | OUTPATIENT
Start: 2025-03-16 | End: 2025-03-19

## 2025-03-16 RX ORDER — METOCLOPRAMIDE 10 MG/1
10 TABLET ORAL EVERY 6 HOURS PRN
Status: DISCONTINUED | OUTPATIENT
Start: 2025-03-16 | End: 2025-03-16

## 2025-03-16 RX ORDER — METOCLOPRAMIDE 10 MG/1
10 TABLET ORAL
Status: DISCONTINUED | OUTPATIENT
Start: 2025-03-16 | End: 2025-03-16

## 2025-03-16 RX ORDER — METOCLOPRAMIDE HYDROCHLORIDE 5 MG/ML
10 INJECTION INTRAMUSCULAR; INTRAVENOUS EVERY 6 HOURS PRN
Status: DISCONTINUED | OUTPATIENT
Start: 2025-03-16 | End: 2025-03-16

## 2025-03-16 RX ORDER — METOCLOPRAMIDE 10 MG/1
10 TABLET ORAL EVERY 4 HOURS PRN
Status: DISCONTINUED | OUTPATIENT
Start: 2025-03-16 | End: 2025-03-19

## 2025-03-16 NOTE — PROGRESS NOTES
Patient is A/Ox4. Patient is up with assistance and walker. IVF and PCA are discontinued. Oral oxy given Q4H PRN and patient verbalizing relief. Freed removed and patient voiding without difficulty. Abdominal binder in place with midline incision with staples. Chest tube to water seal and JUAN DIEGO drain with bloody output. Family at bedside.

## 2025-03-16 NOTE — PROGRESS NOTES
[facility] Hospitalist Progress Note                                                                     [facility]   part of [facility]      Mark Stearns  2/24/1975    SUBJECTIVE: no chest pain, palpitations, shortness of breath, cough, nausea, vomiting. PT with postoperative abdominal pain.     OBJECTIVE:  Temp:  [97.5 °F (36.4 °C)-98.5 °F (36.9 °C)] 97.5 °F (36.4 °C)  Pulse:  [79-87] 83  Resp:  [13-25] 22  BP: (122-152)/(81-86) 135/83  SpO2:  [92 %-96 %] 96 %  Exam  Gen: No acute distress, alert and oriented   Pulm: Lungs clear bilaterally, normal respiratory effort, no crackles, no wheezing,.   CV: Heart with regular rate and rhythm, no murmur.   Abd: Abdomen soft, generalized post operative tenderness, nondistended, hypoactive bowel sounds present  MSK: NO significant pitting edema or tenderness of the LE  Skin: no new rashes or lesions    Labs:   Recent Labs   Lab 03/15/25  0540 03/16/25  0525   WBC 16.1* 21.1*   HGB 11.3* 10.8*   MCV 98.0 96.1   .0 274.0       Recent Labs   Lab 03/15/25  0540 03/16/25  0525    140   K 4.3 3.9    105   CO2 27.0 28.0   BUN 6* <5*   CREATSERUM 0.76 0.71   CA 8.5* 8.8   MG 1.8 1.9   PHOS 3.3 2.2*   * 119*       Recent Labs   Lab 03/15/25  0540   ALT 48   AST 51*   ALB 4.1       No results for input(s): \"PGLU\" in the last 168 hours.    Meds:   Scheduled:    scopolamine  1 patch Transdermal Q48H    enoxaparin  40 mg Subcutaneous Daily    famotidine  20 mg Oral BID    Or    famotidine  20 mg Intravenous BID    acetaminophen  1,000 mg Oral q6h     Continuous Infusions:    lactated ringers 50 mL/hr at 03/16/25 0252    HYDROmorphone in sodium chloride 0.9%       PRN:   oxyCODONE    ondansetron **OR** ondansetron    naloxone    diphenhydrAMINE    influenza virus vaccine PF    diphenhydrAMINE **OR** diphenhydrAMINE    ASSESSMENT / PLAN:   50 year old female with history of gerd, hld, IBS, renal cell  carcinoma s/p nephrectomy, 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      Recurrent metastatic gastrointestinal stromal tumor   -POD # 2 s/p resection of recurrent/metastatic gastrointestinal stromal tumor with en block partial resection of left diaphragm, partial distal pancreatectomy, splenectomy, cytoreduction of multiple jejunal nodes, complex repair of left diaphragm, left chest tube and lysis of adhesions.   --surgical oncology following  -diet per surg onc  -encourage ambulation      Post Operative Pain  -acetaminophen po atc  -hydromorphone iv prn  -oxy po prn --> increase dose due to uncontrolled pain   -ketamine--> hold     Nausea  -zofran and compazine prn --> pt intolerant  -reglan if ok with surg onc     Gerd  -famotidine      Quality:  DVT Prophylaxis: scd, lovenox  CODE status:   Freed:      Plan of care discussed with patient and staff     Dispo: no discharge     Jose Buitrago MD  [facility] Hospitalist  [phone]

## 2025-03-16 NOTE — PLAN OF CARE
Assumed care at 1930,  Patient is alert and oriented X 4  On 0.5L of O2, SR on tele   Chest tube and JUAN DIEGO in place.  Pain control with dilaudid, and tylenol  Safety precaution in place.  Call light within reach, all needs met, and questions answered.

## 2025-03-16 NOTE — PROGRESS NOTES
[facility]  Progress Note    Mark Stearns Patient Status:  Inpatient    1975 MRN [id number]   Location [facility] 7NE-A Attending Tomás Be MD   Hosp Day # 2 PCP Prachi Arenas MD     Postop day 2 resection recurrent GIST with resection left diaphragm, partial pancreatectomy, and splenectomy cytoreduction of multiple jejunal nodes.    Subjective:  Patient sitting up in bed with family at bedside.  Having abdominal pain but states she does not wish to use the PCA anymore.  Abdominal pain at chest tube site and incision site. Denies nausea and vomiting.  Ambulating in room and up to chair. -flatus, -BM.    Objective/Physical Exam:  General: Alert, orientated x3.  Cooperative.  No apparent distress.  Vital Signs:  Blood pressure 136/84, pulse 87, temperature 98.2 °F (36.8 °C), temperature source Oral, resp. rate 19, height 1.676 m (5' 6\"), weight 77.1 kg (170 lb), last menstrual period 2025, SpO2 94%.  Wt Readings from Last 3 Encounters:   25 77.1 kg (170 lb)   25 77.1 kg (170 lb)   25 77.6 kg (171 lb)     Lungs: No respiratory distress.  Cardiac: Regular rate and rhythm.   Abdomen:  Soft, non distended, moderately tender as anticipated for POD 2, with no rebound or guarding.  No peritoneal signs.  Bruising around umbilicus at incision site.  Extremities:  No lower extremity edema noted.    Incision: Clean, dry, intact, no erythema or drainage.  Dressings removed today.  Staples intact.  Drain: SS output.  Chest tube: SS output    Intake/Output:    Intake/Output Summary (Last 24 hours) at 3/16/2025 0934  Last data filed at 3/16/2025 0744  Gross per 24 hour   Intake 22 ml   Output 1525 ml   Net -1503 ml     I/O last 3 completed shifts:  In: 925 [P.O.:50; I.V.:875]  Out: 2660 [Urine:2425; Drains:95; Chest Tube:140]  I/O this shift:  In: 15 [I.V.:15]  Out: 40 [Chest Tube:40]    Medications:    sodium phosphate  15 mmol Intravenous Once    scopolamine  1 patch  Transdermal Q48H    enoxaparin  40 mg Subcutaneous Daily    famotidine  20 mg Oral BID    Or    famotidine  20 mg Intravenous BID    acetaminophen  1,000 mg Oral q6h       Labs:  Lab Results   Component Value Date    WBC 21.1 03/16/2025    HGB 10.8 03/16/2025    HCT 32.3 03/16/2025    .0 03/16/2025     Lab Results   Component Value Date     03/16/2025    K 3.9 03/16/2025     03/16/2025    CO2 28.0 03/16/2025    BUN <5 03/16/2025    CREATSERUM 0.71 03/16/2025     03/16/2025    CA 8.8 03/16/2025     No results found for: \"PT\", \"INR\"      Assessment  Patient Active Problem List   Diagnosis    Gastrointestinal stromal tumor (GIST) (HCC)    GERD (gastroesophageal reflux disease)    H/O right nephrectomy    Seasonal allergies    Secondary malignancy of parietal peritoneum (HCC)    Diarrhea    GIST (gastrointestinal stromal tumor), malignant (HCC)    Malignant neoplasm metastatic to liver (HCC)    Primary hypertension    Renal cell carcinoma (HCC)    Metastatic adenocarcinoma (HCC)    Colon cancer screening    GIST, malignant (HCC)       Patient doing well postoperatively from surgical oncology standpoint.  Provided oxycodone panel for pain relief and Reglan for nausea prn. Plan for chest tube removal and repeat amylase tomorrow.  Possible discharge tomorrow if pain is well-controlled and tolerating diet.    Plan:  - No acute surgical issues  - Discontinue posey  - Diet: ADAT  - Pain: discontinue PCA, scheduled Tylenol and as needed oxy; antiemetics as needed  - Discontinue IV fluids  - Lovenox for DVT prophy  - GI prophylaxis famotidine  - PT/OT to evaluate and treat  - Appreciate medical team recommendations  - Encourage IS and ambulation  - Replace electrolytes per protocol  Dispo: CTU7      Quality:  DVT Mechanical Prophylaxis:   SCDs, Early ambuation  DVT Pharmacologic Prophylaxis   Medication    enoxaparin (Lovenox) 40 MG/0.4ML SUBQ injection 40 mg                Code Status: Not on  file  Freed: Freed catheter in place  Freed Duration (in days): 3  Central line:    NOELLE:     Discussed with Dr. Indiana De Los Santos, RD  Department of Surgical Oncology  [facility]  [address] [place]  [address]  [facility]  [address] [place] [place] [address]  T: [phone]  F: [phone]        RD Parekh  3/16/2025  9:34 AM

## 2025-03-17 ENCOUNTER — APPOINTMENT (OUTPATIENT)
Dept: GENERAL RADIOLOGY | Facility: HOSPITAL | Age: 50
End: 2025-03-17
Payer: COMMERCIAL

## 2025-03-17 ENCOUNTER — APPOINTMENT (OUTPATIENT)
Dept: GENERAL RADIOLOGY | Facility: HOSPITAL | Age: 50
DRG: 330 | End: 2025-03-17
Payer: COMMERCIAL

## 2025-03-17 LAB
AMYLASE PRT-CCNC: 113 U/L
ANION GAP SERPL CALC-SCNC: 6 MMOL/L (ref 0–18)
BUN BLD-MCNC: 8 MG/DL (ref 9–23)
CALCIUM BLD-MCNC: 9.1 MG/DL (ref 8.7–10.6)
CHLORIDE SERPL-SCNC: 103 MMOL/L (ref 98–112)
CO2 SERPL-SCNC: 30 MMOL/L (ref 21–32)
CREAT BLD-MCNC: 0.64 MG/DL
EGFRCR SERPLBLD CKD-EPI 2021: 109 ML/MIN/1.73M2 (ref 60–?)
ERYTHROCYTE [DISTWIDTH] IN BLOOD BY AUTOMATED COUNT: 13.2 %
GLUCOSE BLD-MCNC: 97 MG/DL (ref 70–99)
HCT VFR BLD AUTO: 31.9 %
HGB BLD-MCNC: 10.6 G/DL
MCH RBC QN AUTO: 32.2 PG (ref 26–34)
MCHC RBC AUTO-ENTMCNC: 33.2 G/DL (ref 31–37)
MCV RBC AUTO: 97 FL
OSMOLALITY SERPL CALC.SUM OF ELEC: 286 MOSM/KG (ref 275–295)
PHOSPHATE SERPL-MCNC: 2.3 MG/DL (ref 2.4–5.1)
PLATELET # BLD AUTO: 292 10(3)UL (ref 150–450)
POTASSIUM SERPL-SCNC: 3.4 MMOL/L (ref 3.5–5.1)
RBC # BLD AUTO: 3.29 X10(6)UL
SODIUM SERPL-SCNC: 139 MMOL/L (ref 136–145)
WBC # BLD AUTO: 19.1 X10(3) UL (ref 4–11)

## 2025-03-17 PROCEDURE — 97530 THERAPEUTIC ACTIVITIES: CPT

## 2025-03-17 PROCEDURE — 97116 GAIT TRAINING THERAPY: CPT

## 2025-03-17 PROCEDURE — 85027 COMPLETE CBC AUTOMATED: CPT

## 2025-03-17 PROCEDURE — 80048 BASIC METABOLIC PNL TOTAL CA: CPT

## 2025-03-17 PROCEDURE — 84100 ASSAY OF PHOSPHORUS: CPT | Performed by: SURGERY

## 2025-03-17 PROCEDURE — 82150 ASSAY OF AMYLASE: CPT

## 2025-03-17 PROCEDURE — 71045 X-RAY EXAM CHEST 1 VIEW: CPT

## 2025-03-17 RX ORDER — HYDROMORPHONE HYDROCHLORIDE 1 MG/ML
0.4 INJECTION, SOLUTION INTRAMUSCULAR; INTRAVENOUS; SUBCUTANEOUS EVERY 2 HOUR PRN
Status: DISCONTINUED | OUTPATIENT
Start: 2025-03-17 | End: 2025-03-19

## 2025-03-17 RX ORDER — HYDROMORPHONE HYDROCHLORIDE 1 MG/ML
0.8 INJECTION, SOLUTION INTRAMUSCULAR; INTRAVENOUS; SUBCUTANEOUS EVERY 2 HOUR PRN
Status: DISCONTINUED | OUTPATIENT
Start: 2025-03-17 | End: 2025-03-19

## 2025-03-17 RX ORDER — ONDANSETRON 4 MG/1
4 TABLET, FILM COATED ORAL EVERY 6 HOURS PRN
Qty: 12 TABLET | Refills: 0 | Status: SHIPPED | OUTPATIENT
Start: 2025-03-17

## 2025-03-17 RX ORDER — OXYCODONE HYDROCHLORIDE 15 MG/1
15 TABLET ORAL EVERY 4 HOURS PRN
Qty: 18 TABLET | Refills: 0 | Status: SHIPPED | OUTPATIENT
Start: 2025-03-17 | End: 2025-03-19

## 2025-03-17 RX ORDER — METOCLOPRAMIDE 10 MG/1
10 TABLET ORAL EVERY 6 HOURS PRN
Qty: 12 TABLET | Refills: 0 | Status: SHIPPED | OUTPATIENT
Start: 2025-03-17 | End: 2025-03-24 | Stop reason: ALTCHOICE

## 2025-03-17 RX ORDER — HYDROMORPHONE HYDROCHLORIDE 1 MG/ML
0.8 INJECTION, SOLUTION INTRAMUSCULAR; INTRAVENOUS; SUBCUTANEOUS EVERY 2 HOUR PRN
Status: DISCONTINUED | OUTPATIENT
Start: 2025-03-17 | End: 2025-03-17

## 2025-03-17 RX ORDER — ENOXAPARIN SODIUM 100 MG/ML
40 INJECTION SUBCUTANEOUS DAILY
Qty: 24 EACH | Refills: 0 | Status: SHIPPED | OUTPATIENT
Start: 2025-03-17

## 2025-03-17 RX ORDER — POTASSIUM CHLORIDE 14.9 MG/ML
20 INJECTION INTRAVENOUS ONCE
Status: COMPLETED | OUTPATIENT
Start: 2025-03-17 | End: 2025-03-17

## 2025-03-17 RX ORDER — DOCUSATE SODIUM 100 MG/1
100 CAPSULE, LIQUID FILLED ORAL 2 TIMES DAILY
Status: DISCONTINUED | OUTPATIENT
Start: 2025-03-17 | End: 2025-03-19

## 2025-03-17 RX ORDER — HYDROMORPHONE HYDROCHLORIDE 1 MG/ML
0.2 INJECTION, SOLUTION INTRAMUSCULAR; INTRAVENOUS; SUBCUTANEOUS EVERY 2 HOUR PRN
Status: DISCONTINUED | OUTPATIENT
Start: 2025-03-17 | End: 2025-03-19

## 2025-03-17 NOTE — PAYOR COMM NOTE
--------------  3/16-17  CONTINUED STAY REVIEW    Payor: [facility] PPO  Subscriber #:  [id number]  Authorization Number: [id number]    3/16:    SURGICAL ONCOLOGY:    Postop day 2 resection recurrent GIST with resection left diaphragm, partial pancreatectomy, and splenectomy cytoreduction of multiple jejunal nodes.     Subjective:  Patient sitting up in bed with family at bedside.  Having abdominal pain but states she does not wish to use the PCA anymore.  Abdominal pain at chest tube site and incision site. Denies nausea and vomiting.  Ambulating in room and up to chair. -flatus, -BM.     Objective/Physical Exam:  General: Alert, orientated x3.  Cooperative.  No apparent distress.  Vital Signs:  Blood pressure 136/84, pulse 87, temperature 98.2 °F (36.8 °C), temperature source Oral, resp. rate 19, height 1.676 m (5' 6\"), weight 77.1 kg (170 lb), last menstrual period 02/26/2025, SpO2 94%.     Lungs: No respiratory distress.  Cardiac: Regular rate and rhythm.   Abdomen:  Soft, non distended, moderately tender as anticipated for POD 2, with no rebound or guarding.  No peritoneal signs.  Bruising around umbilicus at incision site.  Extremities:  No lower extremity edema noted.    Incision: Clean, dry, intact, no erythema or drainage.  Dressings removed today.  Staples intact.  Drain: SS output.  Chest tube: SS output      I/O last 3 completed shifts:  In: 925 [P.O.:50; I.V.:875]  Out: 2660 [Urine:2425; Drains:95; Chest Tube:140]  I/O this shift:  In: 15 [I.V.:15]  Out: 40 [Chest Tube:40]     Medications:   Scheduled Medications    sodium phosphate  15 mmol Intravenous Once    scopolamine  1 patch Transdermal Q48H    enoxaparin  40 mg Subcutaneous Daily    famotidine  20 mg Oral BID     Or    famotidine  20 mg Intravenous BID    acetaminophen  1,000 mg Oral q6h         Continuous Infusions:   Medication Infusions    lactated ringers 50 mL/hr at 03/16/25 0252    HYDROmorphone in sodium chloride 0.9%             Lab Results    Component Value Date     WBC 21.1 03/16/2025     HGB 10.8 03/16/2025     HCT 32.3 03/16/2025     .0 03/16/2025         03/16/2025     K 3.9 03/16/2025      03/16/2025     CO2 28.0 03/16/2025     BUN <5 03/16/2025     CREATSERUM 0.71 03/16/2025      03/16/2025     CA 8.8 03/16/2025          Assessment     Patient doing well postoperatively from surgical oncology standpoint.  Provided oxycodone panel for pain relief and Reglan for nausea prn. Plan for chest tube removal and repeat amylase tomorrow.  Possible discharge tomorrow if pain is well-controlled and tolerating diet.     Plan:  - No acute surgical issues  - Discontinue posey  - Diet: ADAT  - Pain: discontinue PCA, scheduled Tylenol and as needed oxy; antiemetics as needed  - Discontinue IV fluids  - Lovenox for DVT prophy  - GI prophylaxis famotidine  - PT/OT to evaluate and treat  - Appreciate medical team recommendations  - Encourage IS and ambulation  - Replace electrolytes per protocol  Dispo: CTU7         3/17:    SURGICAL ONCOLOGY:  POD#3 Resection recurrent GIST tumor with en-bloc partial resection of left diaphragm, partial pancreatectomy, and splenectomy. Cytoreduction multiple jejunal nodules. Left chest tube placement with complex repair left diaphragm. Intraoperative ultrasound. Lysis of adhesions.      Continued pain overnight requiring 15 mg Oxy every 4 hours. Tolerating diet but persistent nausea. Getting up with assistance.      Blood pressure 132/81, pulse 88, temperature 98.3 °F (36.8 °C), temperature source Oral, resp. rate 22, height 1.676 m (5' 6\"), weight 77.1 kg (170 lb), last menstrual period 02/26/2025, SpO2 98%.     Intake/Output Summary (Last 24 hours) at 3/17/2025 0738  Last data filed at 3/17/2025 0500      Gross per 24 hour   Intake 17 ml   Output 1850 ml   Net -1833 ml            Lab Results   Component Value Date     WBC 19.1 03/17/2025     HGB 10.6 03/17/2025     HCT 31.9 03/17/2025     .0  03/17/2025     CREATSERUM 0.64 03/17/2025     BUN 8 03/17/2025      03/17/2025     K 3.4 03/17/2025      03/17/2025     CO2 30.0 03/17/2025     GLU 97 03/17/2025     CA 9.1 03/17/2025     PHOS 2.3 03/17/2025      Constitutional:  NAD.   Eyes: non-icteric.    Chest: Chest tube to water seal, SS output.   Abdomen: Soft, non-tender, non-distended. Incision site healing well with minimal drainage on dressing. Drain SS.   Musculoskeletal: no edema.     - No acute surgical issues  - CXR this AM for small pneumo seen yesterday. If stable to improved, remove chest tube   - Pain: Tylenol, oxy prn, dilaudid prn  - Diet: low fiber soft  - GI prophy: famotidine  - DVT Prophy: Lovenox  - Encourage PT/OT/IS/Ambulation  - Splenectomy vaccines prior to discharge    MEDICATIONS ADMINISTERED IN LAST 1 DAY:  acetaminophen (Tylenol Extra Strength) tab 1,000 mg       Date Action Dose Route User    3/17/2025 1208 Given 1,000 mg Oral Saba Chavez RN    3/17/2025 0508 Given 1,000 mg Oral AcheZina ruiz RN    3/16/2025 2305 Given 1,000 mg Oral Zina Mckeon RN          diphenhydrAMINE (Benadryl) cap/tab 25 mg       Date Action Dose Route User    3/17/2025 1607 Given 25 mg Oral Saba Chavez RN    3/17/2025 1210 Given 25 mg Oral Rachnaa Rausch RN    3/17/2025 0304 Given 25 mg Oral AcheZina ruiz RN    3/16/2025 2305 Given 25 mg Oral AcheZina ruiz RN          diphenhydrAMINE (Benadryl) 50 mg/mL  injection 12.5 mg       Date Action Dose Route User    3/16/2025 1758 Given 12.5 mg Intravenous Arin Chavarria RN          enoxaparin (Lovenox) 40 MG/0.4ML SUBQ injection 40 mg       Date Action Dose Route User    3/17/2025 0828 Given 40 mg Subcutaneous (Left Lower Abdomen) Saba Chavez RADHA          famotidine (Pepcid) tab 20 mg       Date Action Dose Route User    3/17/2025 0829 Given 20 mg Oral Saba Chavez RN    3/16/2025 2028 Given 20 mg Oral Zina Mckeon RN          metoclopramide (Reglan) tab 10 mg       Date  Action Dose Route User    3/16/2025 2305 Given 10 mg Oral Zina Mckeon RN          metoclopramide (Reglan) 5 mg/mL injection 10 mg       Date Action Dose Route User    3/16/2025 1757 Given 10 mg Intravenous Arin Chavarria RN          oxyCODONE immediate release tab 10 mg       Date Action Dose Route User    3/17/2025 1607 Given 10 mg Oral Saba Chavez RN    3/17/2025 1208 Given 10 mg Oral Saba Chavez RN          potassium chloride 20 mEq/100mL IVPB premix 20 mEq       Date Action Dose Route User    3/17/2025 1414 New Bag 20 mEq Intravenous Saba Chavez RN          potassium phosphate dibasic 15 mmol in sodium chloride 0.9% 250 mL IVPB       Date Action Dose Route User    3/17/2025 0916 New Bag 15 mmol Intravenous Saba Chavez RN          oxyCODONE immediate release tab 15 mg       Date Action Dose Route User    3/17/2025 0721 Given 15 mg Oral Zina Mckeon RN    3/17/2025 0301 Given 15 mg Oral Zina Mckeon RN    3/16/2025 2305 Given 15 mg Oral Zina Mckeon RN    3/16/2025 1754 Given 15 mg Oral Arin Chavarria RN            Vitals (last day)       Date/Time Temp Pulse Resp BP SpO2 Weight O2 Device O2 Flow Rate (L/min) [facility]    03/17/25 1200 98.4 °F (36.9 °C) 98 14 125/87 94 % -- None (Room air) --     03/17/25 0800 98.3 °F (36.8 °C) 91 18 135/85 92 % -- None (Room air) -- JUAN DIEGO    03/17/25 0700 -- 105 26 -- -- -- -- --     03/17/25 0515 98.3 °F (36.8 °C) 88 22 132/81 98 % -- None (Room air) -- NA    03/16/25 2346 -- -- 19 -- -- -- -- -- NA    03/16/25 2346 99.1 °F (37.3 °C) 95 -- 147/94 90 % -- None (Room air) -- AT    03/16/25 2027 98.1 °F (36.7 °C) 87 21 130/76 90 % -- None (Room air) -- AT    03/16/25 1600 98.1 °F (36.7 °C) 81 24 134/79 94 % -- None (Room air) --     03/16/25 1250 98 °F (36.7 °C) 88 20 141/87 92 % -- None (Room air) --     03/16/25 0800 98.2 °F (36.8 °C) 87 19 136/84 94 % -- -- --     03/16/25 0530 97.5 °F (36.4 °C) 83 22 135/83 96 % -- Nasal cannula 0.5 L/min TL     03/16/25 0000 97.7 °F (36.5 °C) 87 22 142/82 92 % -- None (Room air) -- TL

## 2025-03-17 NOTE — PROGRESS NOTES
POD#3 Resection recurrent GIST tumor with en-bloc partial resection of left diaphragm, partial pancreatectomy, and splenectomy. Cytoreduction multiple jejunal nodules. Left chest tube placement with complex repair left diaphragm. Intraoperative ultrasound. Lysis of adhesions.     Continued pain overnight requiring 15 mg Oxy every 4 hours. Tolerating diet but persistent nausea. Getting up with assistance.     Blood pressure 132/81, pulse 88, temperature 98.3 °F (36.8 °C), temperature source Oral, resp. rate 22, height 1.676 m (5' 6\"), weight 77.1 kg (170 lb), last menstrual period 02/26/2025, SpO2 98%.    Intake/Output Summary (Last 24 hours) at 3/17/2025 0738  Last data filed at 3/17/2025 0500  Gross per 24 hour   Intake 17 ml   Output 1850 ml   Net -1833 ml     Lab Results   Component Value Date    WBC 19.1 03/17/2025    HGB 10.6 03/17/2025    HCT 31.9 03/17/2025    .0 03/17/2025    CREATSERUM 0.64 03/17/2025    BUN 8 03/17/2025     03/17/2025    K 3.4 03/17/2025     03/17/2025    CO2 30.0 03/17/2025    GLU 97 03/17/2025    CA 9.1 03/17/2025    PHOS 2.3 03/17/2025     Constitutional:  NAD.   Eyes: non-icteric.    Chest: Chest tube to water seal, SS output.   Abdomen: Soft, non-tender, non-distended. Incision site healing well with minimal drainage on dressing. Drain SS.   Musculoskeletal: no edema.      - No acute surgical issues  - CXR this AM for small pneumo seen yesterday. If stable to improved, remove chest tube   - Pain: Tylenol, oxy prn, dilaudid prn  - Diet: low fiber soft  - GI prophy: famotidine  - DVT Prophy: Lovenox  - Encourage PT/OT/IS/Ambulation  - Splenectomy vaccines prior to discharge  - OK to discharge from surg onc standpoint today    MAXWELL Rose

## 2025-03-17 NOTE — DISCHARGE INSTRUCTIONS
Post Op Instructions    Thank you for choosing the Surgical Oncology team at [facility].  Managing Your Pain  Take your medications as directed and as needed. You may also take 1000 mg of acetaminophen (Tylenol®) every 6 hours as needed for pain.  Call our office if the medication prescribed for you doesn't ease your pain.  Don't drive or drink alcohol while you're taking prescription pain medication  Pain medication should help you resume your normal activities. Take enough medication to do your exercises comfortably. However, it's normal for your pain to increase a little as you start to be more active.  Keep track of when you take your pain medication. It works best 30 to 45 minutes after you take it. Taking it when your pain first begins is better than waiting for the pain to get worse.  Pain medication may cause constipation  Managing Constipation  Go to the bathroom at the same time every day.   Exercise. Walking is an excellent form of exercise.  Drink 8 (8-ounce) glasses (2 liters) of liquids daily, if you can. Drink water, juices (such as prune juice), soups, ice cream shakes, and other drinks that don't have caffeine.   If you haven't had a bowel movement in 3 days, call our office  Caring for Your Incision  It's normal for the skin below your incision to feel numb. This happens because some of the nerves were cut during your surgery. The numbness will go away over time.  Leave the dressing on for 48 hours after surgery. The clear outer dressing (Tegaderm) can then come off.  The staples and/or sutures (stitches) in your incision will be removed in the office after surgery. This is usually about 2 weeks after you're discharged.   If you go home with Steri-Strips on your incision, they will loosen and fall off by themselves. If they haven't fallen off within 14 days, you can take them off.  If the area around your incision is red, puffy, or if you have any drainage from your incision, contact  our office.  Showering  You may shower 48 hours after surgery. When you shower, use mild soap to gently wash your incision. After you shower, pat the area dry with a clean towel. Don't rub over your incision. Leave your incision uncovered, unless there's drainage.  Avoid tub baths until your surgeon says it's okay.  Eating and Drinking  You may resume a soft diet when you go home. You may not be able to eat as much as you did before your surgery. Try to eat 4 to 6 small meals a day.  Drink plenty of liquids. Try to drink 8 (8-ounce) glasses of liquids every day. Don't drink alcohol until you check with your surgeon.  Activity and Exercise  Remember, recovery after surgery takes several weeks. It is common to feel tired or fatigued. Rest as needed.   Doing aerobic exercise, such as walking and stair climbing, will help you gain strength and feel better. Gradually increase the distance you walk. Rest or stop as needed.  Don't lift anything heavier than 10 pounds for at least 8 weeks after your surgery or until your surgeon says it's okay.   Avoid strenuous activity and exercises until your surgeon says it's okay.  Ask your surgeon when it is okay for you to drive.   Ask your surgeon when you can return to work.  When to Call:  Let us know if you experience the following symptoms:  Fever of 100.4° F (38°C) or higher  Cloudy or smelly drainage from the incision site  The skin around your incision is warm/red/swollen  Sudden increase in pain or new pain  Shaking chills  Fast pulse  Shortness of breath or chest pain  Nausea or vomiting.   Diarrhea  Constipation that isn't relieved in 3 days  Signs of a bladder infection (urinating more often than usual, burning while urinating, bleeding or hesitancy while urinating).  Any new or unexplained symptoms    Our office will contact you for a follow up appointment.     Please arrive at the following location:  [place]: Zahraa Putnam [address] [place] [id number]  [facility]: [address] [place] [address]  Please call us at [phone] if you are unable to make it to your appointment or if you have any questions.

## 2025-03-17 NOTE — PHYSICAL THERAPY NOTE
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     HOME SITUATION  Type of Home: Condo  Home Layout: One level    Lives With: Spouse    Drives: Yes        Prior Level of [place]: Pt states that she lives in a house with her spouse. Pt reports that she is IND with all ADLs and Gait.     PHYSICAL THERAPY ASSESSMENT   Patient demonstrates good  progress this session, goals   met .      Patient is currently functioning near baseline with bed mobility, transfers, and gait.    Patient currently does not meet criteria for skilled inpatient physical therapy services, however patient will continue to benefit from QID ambulation with nursing staff.  Pt is discharged from IP PT services.  RN aware to re-order if there is a decline in mobility status.      Patient continues to benefit from continued skilled PT services: at discharge to promote prior level of function and safety with additional support and return home with home health PT.    PLAN DURING HOSPITALIZATION  Nursing Mobility Recommendation : 1 Assist  PT Device Recommendation: Rolling walker  PT Treatment Plan: Bed mobility, Body mechanics, Gait training, Strengthening, Stair training, Transfer training, Balance training  Frequency (Obs): 3-5x/week     CURRENT GOALS   Goal #1 Patient is able to demonstrate supine - sit EOB @ level: supervision  MET   Goal #2 Patient is able to demonstrate transfers EOB  to/from [facility] at assistance level: supervision  MET   Goal #3 Patient is able to ambulate 150 feet with assist device: walker - rolling at assistance level: supervision  MET   Goal #4 Pt will perform 6 steps with bilateral railings with Sup   Goal #5    Goal #6    Goal Comments: Goals established on 3/15/2025  3/17/2025 all goals ongoing    SUBJECTIVE  \"I don't have stairs\"    OBJECTIVE  Precautions: JUAN DIEGO tube, Chest tube to water seal, Abdominal protective strategies    WEIGHT BEARING RESTRICTION     PAIN ASSESSMENT   Ratin  Location: Pt reports no pain       BALANCE                                                                                                                       Static Sitting: Fair +  Dynamic Sitting: Fair +           Static Standing: Fair -  Dynamic Standing: Fair -    ACTIVITY TOLERANCE                         O2 WALK       AM-PAC '6-Clicks' INPATIENT SHORT FORM - BASIC MOBILITY  How much difficulty does the patient currently have...  Patient Difficulty: Turning over in bed (including adjusting bedclothes, sheets and blankets)?: None   Patient Difficulty: Sitting down on and standing up from a chair with arms (e.g., wheelchair, bedside commode, etc.): A Little   Patient Difficulty: Moving from lying on back to sitting on the side of the bed?: None   How much help from another person does the patient currently need...   Help from Another: Moving to and from a bed to a chair (including a wheelchair)?: None   Help from Another: Need to walk in hospital room?: None   Help from Another: Climbing 3-5 steps with a railing?: A Little     AM-PAC Score:  Raw Score: 22   Approx Degree of Impairment: 20.91%   Standardized Score (AM-PAC Scale): 53.28   CMS Modifier (G-Code): CJ    FUNCTIONAL ABILITY STATUS  Gait Assessment   Functional Mobility/Gait Assessment  Gait Assistance: Supervision, Modified independent  Distance (ft): 200  Assistive Device: Rolling walker  Pattern:  (dec franck and  speed)    Skilled Therapy Provided    Bed Mobility:  Rolling: ind   Supine<>Sit: ind   Sit<>Supine: NT     Transfer Mobility:  Sit<>Stand: ind   Stand<>Sit: ind   Gait: supervision    Therapist's Comments: RN cleared for session. Pt agreeable for therapy, received supine. PT trained on log roll technique for supine<>sit transfer for abdominal protection and pain mgmt. Cued on UE/LE placement for optimal force generation for STS transfer. Pt trained on proper RW usage, resized for optimal gait mechanics. Instructed to call for nursing staff for any needs and OOB mobility.     Patient End of Session: Up in chair, Needs met, Call light within reach, RN aware of session/findings, All patient questions and concerns addressed, Hospital anti-slip socks, Discussed recommendations with /    PT Session Time: 30 minutes  Gait Training: 15 minutes  Therapeutic Activity: 10 minutes

## 2025-03-17 NOTE — PLAN OF CARE
Received pt at 1930  Pt AOx4, NSR, RA, VSS  Spouse at bedside NOC  Midline incision w/ scant drainage  JUAN DIEGO w/ no output  CT H2O seal, ss output  UO adequate  D/c Planning: Anticipate home once medically cleared  Call light within reach.  Needs currently met

## 2025-03-17 NOTE — PROGRESS NOTES
[facility] Hospitalist Progress Note                                                                     [facility]   part of [facility]      Mark Stearns  2/24/1975    SUBJECTIVE: no chest pain, palpitations, shortness of breath, cough, nausea, vomiting. PT with postoperative abdominal pain although better controlled today     OBJECTIVE:  Temp:  [98 °F (36.7 °C)-99.1 °F (37.3 °C)] 98.3 °F (36.8 °C)  Pulse:  [81-95] 88  Resp:  [19-24] 22  BP: (130-147)/(76-94) 132/81  SpO2:  [90 %-98 %] 98 %  Exam  Gen: No acute distress, alert and oriented   Pulm: Lungs clear bilaterally, normal respiratory effort, no crackles, no wheezing,.   CV: Heart with regular rate and rhythm, no murmur.   Abd: Abdomen soft, generalized post operative tenderness, nondistended, hypoactive bowel sounds present  MSK: NO significant pitting edema or tenderness of the LE  Skin: no new rashes or lesions    Labs:   Recent Labs   Lab 03/15/25  0540 03/16/25  0525 03/17/25  0711   WBC 16.1* 21.1* 19.1*   HGB 11.3* 10.8* 10.6*   MCV 98.0 96.1 97.0   .0 274.0 292.0       Recent Labs   Lab 03/15/25  0540 03/16/25  0525 03/17/25  0506    140 139   K 4.3 3.9 3.4*    105 103   CO2 27.0 28.0 30.0   BUN 6* <5* 8*   CREATSERUM 0.76 0.71 0.64   CA 8.5* 8.8 9.1   MG 1.8 1.9  --    PHOS 3.3 2.2* 2.3*   * 119* 97       Recent Labs   Lab 03/15/25  0540   ALT 48   AST 51*   ALB 4.1       No results for input(s): \"PGLU\" in the last 168 hours.    Meds:   Scheduled:    enoxaparin  40 mg Subcutaneous Daily    famotidine  20 mg Oral BID    Or    famotidine  20 mg Intravenous BID    acetaminophen  1,000 mg Oral q6h     Continuous Infusions:       PRN:   HYDROmorphone **OR** HYDROmorphone **OR** HYDROmorphone    oxyCODONE **OR** oxyCODONE **OR** oxyCODONE    metoclopramide **OR** metoclopramide    ondansetron **OR** ondansetron    influenza virus vaccine PF    diphenhydrAMINE **OR**  diphenhydrAMINE    ASSESSMENT / PLAN:   50 year old female with history of gerd, hld, IBS, renal cell carcinoma s/p nephrectomy, 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      Recurrent metastatic gastrointestinal stromal tumor   -POD # 3 s/p resection of recurrent/metastatic gastrointestinal stromal tumor with en block partial resection of left diaphragm, partial distal pancreatectomy, splenectomy, cytoreduction of multiple jejunal nodes, complex repair of left diaphragm, left chest tube and lysis of adhesions.   --surgical oncology following  -diet per surg onc--> soft  -encourage ambulation   -CT and drain management per surg/onc     Post Operative Pain  -acetaminophen po atc  -hydromorphone iv prn--> stopped  -oxy po prn --> increase dose due to uncontrolled pain   -ketamine--> stopped    Nausea--> resolved  -zofran and compazine prn --> pt intolerant  -reglan prn     Gerd  -famotidine      Quality:  DVT Prophylaxis: scd, lovenox  CODE status:   Freed:      Plan of care discussed with patient and staff     Dispo: discharge per surg/onc     MD Deisi Sunshine Hospitalist  [phone]

## 2025-03-17 NOTE — CM/SW NOTE
SW consult for home health eval. Pt admitted on 3/14/2025 from Home for S/p 3/15 Resection recurrent GIST tumor with en-bloc partial resection of left diaphragm, partial pancreatectomy, and splenectomy.     Recommendations for [facility] once medically cleared for DC, referrals initiated. LETY/MARTHA to remain available for DC planning needs/recommendations.     MARK Mohr

## 2025-03-18 ENCOUNTER — APPOINTMENT (OUTPATIENT)
Dept: GENERAL RADIOLOGY | Facility: HOSPITAL | Age: 50
End: 2025-03-18
Payer: COMMERCIAL

## 2025-03-18 ENCOUNTER — APPOINTMENT (OUTPATIENT)
Dept: GENERAL RADIOLOGY | Facility: HOSPITAL | Age: 50
DRG: 330 | End: 2025-03-18
Payer: COMMERCIAL

## 2025-03-18 LAB
ANION GAP SERPL CALC-SCNC: 5 MMOL/L (ref 0–18)
BUN BLD-MCNC: 9 MG/DL (ref 9–23)
CALCIUM BLD-MCNC: 9 MG/DL (ref 8.7–10.6)
CHLORIDE SERPL-SCNC: 104 MMOL/L (ref 98–112)
CO2 SERPL-SCNC: 31 MMOL/L (ref 21–32)
CREAT BLD-MCNC: 0.69 MG/DL
EGFRCR SERPLBLD CKD-EPI 2021: 107 ML/MIN/1.73M2 (ref 60–?)
ERYTHROCYTE [DISTWIDTH] IN BLOOD BY AUTOMATED COUNT: 13.5 %
GLUCOSE BLD-MCNC: 98 MG/DL (ref 70–99)
HCT VFR BLD AUTO: 31.2 %
HGB BLD-MCNC: 10.1 G/DL
MCH RBC QN AUTO: 31.4 PG (ref 26–34)
MCHC RBC AUTO-ENTMCNC: 32.4 G/DL (ref 31–37)
MCV RBC AUTO: 96.9 FL
OSMOLALITY SERPL CALC.SUM OF ELEC: 289 MOSM/KG (ref 275–295)
PHOSPHATE SERPL-MCNC: 2.5 MG/DL (ref 2.4–5.1)
PLATELET # BLD AUTO: 324 10(3)UL (ref 150–450)
POTASSIUM SERPL-SCNC: 3.8 MMOL/L (ref 3.5–5.1)
POTASSIUM SERPL-SCNC: 3.8 MMOL/L (ref 3.5–5.1)
RBC # BLD AUTO: 3.22 X10(6)UL
SODIUM SERPL-SCNC: 140 MMOL/L (ref 136–145)
WBC # BLD AUTO: 16.6 X10(3) UL (ref 4–11)

## 2025-03-18 PROCEDURE — 71045 X-RAY EXAM CHEST 1 VIEW: CPT

## 2025-03-18 PROCEDURE — 80048 BASIC METABOLIC PNL TOTAL CA: CPT

## 2025-03-18 PROCEDURE — 84100 ASSAY OF PHOSPHORUS: CPT | Performed by: SURGERY

## 2025-03-18 PROCEDURE — 84132 ASSAY OF SERUM POTASSIUM: CPT | Performed by: SURGERY

## 2025-03-18 PROCEDURE — 85027 COMPLETE CBC AUTOMATED: CPT

## 2025-03-18 RX ORDER — SIMETHICONE 80 MG
80 TABLET,CHEWABLE ORAL 4 TIMES DAILY PRN
Status: DISCONTINUED | OUTPATIENT
Start: 2025-03-18 | End: 2025-03-19

## 2025-03-18 RX ORDER — SIMETHICONE 80 MG
80 TABLET,CHEWABLE ORAL
Status: DISCONTINUED | OUTPATIENT
Start: 2025-03-18 | End: 2025-03-18

## 2025-03-18 NOTE — PLAN OF CARE
Assumed care at 0730  A&Ox4, VSS, up with 1 and walker   Oxy and tylenol given for pain   Tolerating diet   Chest xray complete- L chest tube back to suction per Surg onc team   L JUAN DIEGO intact   Kphos replaced   Midline incision intact   Patient and family updated on POC   All questions answered   Call light within reach

## 2025-03-18 NOTE — PROGRESS NOTES
POD#4 Resection recurrent GIST tumor with en-bloc partial resection of left diaphragm, partial pancreatectomy, and splenectomy. Cytoreduction multiple jejunal nodules. Left chest tube placement with complex repair left diaphragm. Intraoperative ultrasound. Lysis of adhesions.     Doing well this morning. Pain controlled. Tolerating diet. No shortness of breath.     Blood pressure 130/83, pulse 80, temperature 98.6 °F (37 °C), temperature source Tympanic, resp. rate 13, height 1.676 m (5' 6\"), weight 77.1 kg (170 lb), last menstrual period 02/26/2025, SpO2 92%.    Intake/Output Summary (Last 24 hours) at 3/18/2025 0712  Last data filed at 3/18/2025 0430  Gross per 24 hour   Intake --   Output 1460 ml   Net -1460 ml     Lab Results   Component Value Date    WBC 16.6 03/18/2025    HGB 10.1 03/18/2025    HCT 31.2 03/18/2025    .0 03/18/2025    CREATSERUM 0.69 03/18/2025    BUN 9 03/18/2025     03/18/2025    K 3.8 03/18/2025    K 3.8 03/18/2025     03/18/2025    CO2 31.0 03/18/2025    GLU 98 03/18/2025    CA 9.0 03/18/2025    PHOS 2.5 03/18/2025     Constitutional:  NAD.   Eyes: non-icteric.    Chest: Chest tube to water seal, serous output  Abdomen: Soft, non-tender, non-distended. Incision site healing well with minimal drainage on dressing. Drain SS.   Musculoskeletal: no edema.  Attending:  I saw and examined patient.  Chest x-ray reviewed and discussed.  Plan as above.  Tomás Be MD      - No acute surgical issues  - CXR with decrease in size of pneumo 19 --> 6 mm. Chest tube off suction, repeat CXR around noon  - Pain: Tylenol, oxy prn, dilaudid prn  - Diet: low fiber soft  - GI prophy: famotidine  - DVT Prophy: Lovenox  - Encourage PT/OT/IS/Ambulation  - Splenectomy vaccines prior to discharge  - Discharge pending CXR    Patient seen and examined with MAXWELL Vinson

## 2025-03-18 NOTE — PLAN OF CARE
Received pt at 1930  Pt AOx4, NSR, RA, VSS  Spouse at bedside NOC  Midline incision w/ cdi  JUAN DIEGO w/ no output  CT -20cm, s output  UO adequate  Rpt CXR completed  D/c Planning: Anticipate home once medically cleared  Call light within reach.  Needs currently met

## 2025-03-18 NOTE — PROGRESS NOTES
[facility] Hospitalist Progress Note                                                                     [facility]   part of [facility]      Mark Stearns  2/24/1975    SUBJECTIVE: no chest pain, palpitations, shortness of breath, cough, nausea, vomiting. PT with postoperative abdominal pain although better controlled today     OBJECTIVE:  Temp:  [98.2 °F (36.8 °C)-98.6 °F (37 °C)] 98.6 °F (37 °C)  Pulse:  [] 82  Resp:  [14-26] 15  BP: (118-141)/(72-87) 126/83  SpO2:  [92 %-94 %] 92 %  Exam  Gen: No acute distress, alert and oriented   Pulm: Lungs clear bilaterally, normal respiratory effort, no crackles, no wheezing,.   CV: Heart with regular rate and rhythm, no murmur.   Abd: Abdomen soft, generalized post operative tenderness, nondistended, hypoactive bowel sounds present  MSK: NO significant pitting edema or tenderness of the LE  Skin: no new rashes or lesions    Labs:   Recent Labs   Lab 03/15/25  0540 03/16/25  0525 03/17/25  0711 03/18/25  0540   WBC 16.1* 21.1* 19.1* 16.6*   HGB 11.3* 10.8* 10.6* 10.1*   MCV 98.0 96.1 97.0 96.9   .0 274.0 292.0 324.0       Recent Labs   Lab 03/15/25  0540 03/16/25  0525 03/17/25  0506 03/18/25  0540    140 139 140   K 4.3 3.9 3.4* 3.8  3.8    105 103 104   CO2 27.0 28.0 30.0 31.0   BUN 6* <5* 8* 9   CREATSERUM 0.76 0.71 0.64 0.69   CA 8.5* 8.8 9.1 9.0   MG 1.8 1.9  --   --    PHOS 3.3 2.2* 2.3* 2.5   * 119* 97 98       Recent Labs   Lab 03/15/25  0540   ALT 48   AST 51*   ALB 4.1       No results for input(s): \"PGLU\" in the last 168 hours.    Meds:   Scheduled:    docusate sodium  100 mg Oral BID    enoxaparin  40 mg Subcutaneous Daily    famotidine  20 mg Oral BID    Or    famotidine  20 mg Intravenous BID    acetaminophen  1,000 mg Oral q6h     Continuous Infusions:       PRN:   HYDROmorphone **OR** HYDROmorphone **OR** HYDROmorphone    pneumococcal 20-Tigist conj vacc    haemophilus b  polysac conj vac    meningococcal A C Y&W-135 olig    meningococcal B recomb OMV adj    oxyCODONE **OR** oxyCODONE **OR** oxyCODONE    metoclopramide **OR** metoclopramide    ondansetron **OR** ondansetron    influenza virus vaccine PF    diphenhydrAMINE **OR** diphenhydrAMINE    ASSESSMENT / PLAN:   50 year old female with history of gerd, hld, IBS, renal cell carcinoma s/p nephrectomy, 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      Recurrent metastatic gastrointestinal stromal tumor   -POD # 4 s/p resection of recurrent/metastatic gastrointestinal stromal tumor with en block partial resection of left diaphragm, partial distal pancreatectomy, splenectomy, cytoreduction of multiple jejunal nodes, complex repair of left diaphragm, left chest tube and lysis of adhesions.   --surgical oncology following  -diet per surg onc--> soft, tolerating  -encourage ambulation   -CT and drain management per surg/onc--cxr showing pneumo with improvement, follow surg/onc rec on chest tube removal      Post Operative Pain  -acetaminophen po atc  -hydromorphone iv prn--> stopped  -oxy po prn --> increase dose due to uncontrolled pain --> improved  -ketamine--> stopped    Nausea--> resolved  -zofran and compazine prn --> pt intolerant  -reglan prn     Gerd  -famotidine      Quality:  DVT Prophylaxis: scd, lovenox  CODE status:   Freed:      Plan of care discussed with patient and staff     Dispo: discharge per surg/onc     Jose Buitrago MD  [facility] Hospitalist  [phone]

## 2025-03-19 ENCOUNTER — APPOINTMENT (OUTPATIENT)
Dept: GENERAL RADIOLOGY | Facility: HOSPITAL | Age: 50
DRG: 330 | End: 2025-03-19
Payer: COMMERCIAL

## 2025-03-19 ENCOUNTER — APPOINTMENT (OUTPATIENT)
Dept: GENERAL RADIOLOGY | Facility: HOSPITAL | Age: 50
End: 2025-03-19
Payer: COMMERCIAL

## 2025-03-19 VITALS
HEIGHT: 66 IN | HEART RATE: 89 BPM | OXYGEN SATURATION: 95 % | TEMPERATURE: 98 F | SYSTOLIC BLOOD PRESSURE: 125 MMHG | RESPIRATION RATE: 24 BRPM | WEIGHT: 170 LBS | BODY MASS INDEX: 27.32 KG/M2 | DIASTOLIC BLOOD PRESSURE: 75 MMHG

## 2025-03-19 LAB
ALBUMIN SERPL-MCNC: 3.9 G/DL (ref 3.2–4.8)
ALBUMIN/GLOB SERPL: 1.5 {RATIO} (ref 1–2)
ALP LIVER SERPL-CCNC: 115 U/L
ALT SERPL-CCNC: 30 U/L
ANION GAP SERPL CALC-SCNC: 7 MMOL/L (ref 0–18)
AST SERPL-CCNC: 26 U/L (ref ?–34)
BILIRUB SERPL-MCNC: 0.4 MG/DL (ref 0.3–1.2)
BUN BLD-MCNC: 6 MG/DL (ref 9–23)
CALCIUM BLD-MCNC: 9.2 MG/DL (ref 8.7–10.6)
CHLORIDE SERPL-SCNC: 103 MMOL/L (ref 98–112)
CO2 SERPL-SCNC: 30 MMOL/L (ref 21–32)
CREAT BLD-MCNC: 0.69 MG/DL
EGFRCR SERPLBLD CKD-EPI 2021: 107 ML/MIN/1.73M2 (ref 60–?)
ERYTHROCYTE [DISTWIDTH] IN BLOOD BY AUTOMATED COUNT: 13.5 %
GLOBULIN PLAS-MCNC: 2.6 G/DL (ref 2–3.5)
GLUCOSE BLD-MCNC: 125 MG/DL (ref 70–99)
HCT VFR BLD AUTO: 31 %
HGB BLD-MCNC: 10.3 G/DL
MCH RBC QN AUTO: 31.5 PG (ref 26–34)
MCHC RBC AUTO-ENTMCNC: 33.2 G/DL (ref 31–37)
MCV RBC AUTO: 94.8 FL
OSMOLALITY SERPL CALC.SUM OF ELEC: 289 MOSM/KG (ref 275–295)
PHOSPHATE SERPL-MCNC: 3.4 MG/DL (ref 2.4–5.1)
PLATELET # BLD AUTO: 405 10(3)UL (ref 150–450)
POTASSIUM SERPL-SCNC: 3.2 MMOL/L (ref 3.5–5.1)
POTASSIUM SERPL-SCNC: 3.2 MMOL/L (ref 3.5–5.1)
PROT SERPL-MCNC: 6.5 G/DL (ref 5.7–8.2)
RBC # BLD AUTO: 3.27 X10(6)UL
SODIUM SERPL-SCNC: 140 MMOL/L (ref 136–145)
WBC # BLD AUTO: 14.5 X10(3) UL (ref 4–11)

## 2025-03-19 PROCEDURE — 71045 X-RAY EXAM CHEST 1 VIEW: CPT

## 2025-03-19 PROCEDURE — 90471 IMMUNIZATION ADMIN: CPT

## 2025-03-19 PROCEDURE — 84132 ASSAY OF SERUM POTASSIUM: CPT | Performed by: SURGERY

## 2025-03-19 PROCEDURE — 85027 COMPLETE CBC AUTOMATED: CPT

## 2025-03-19 PROCEDURE — 84100 ASSAY OF PHOSPHORUS: CPT | Performed by: SURGERY

## 2025-03-19 PROCEDURE — 80053 COMPREHEN METABOLIC PANEL: CPT

## 2025-03-19 RX ORDER — HYDROCODONE BITARTRATE AND ACETAMINOPHEN 5; 325 MG/1; MG/1
1 TABLET ORAL EVERY 6 HOURS PRN
Status: DISCONTINUED | OUTPATIENT
Start: 2025-03-19 | End: 2025-03-19

## 2025-03-19 RX ORDER — POTASSIUM CHLORIDE 1500 MG/1
40 TABLET, EXTENDED RELEASE ORAL EVERY 4 HOURS
Status: COMPLETED | OUTPATIENT
Start: 2025-03-19 | End: 2025-03-19

## 2025-03-19 RX ORDER — POLYETHYLENE GLYCOL 3350 17 G/17G
17 POWDER, FOR SOLUTION ORAL DAILY PRN
Status: DISCONTINUED | OUTPATIENT
Start: 2025-03-19 | End: 2025-03-19

## 2025-03-19 RX ORDER — SENNOSIDES 8.6 MG
8.6 TABLET ORAL 2 TIMES DAILY
Status: DISCONTINUED | OUTPATIENT
Start: 2025-03-19 | End: 2025-03-19

## 2025-03-19 RX ORDER — AMLODIPINE BESYLATE 5 MG/1
5 TABLET ORAL DAILY
Status: DISCONTINUED | OUTPATIENT
Start: 2025-03-19 | End: 2025-03-19

## 2025-03-19 RX ORDER — PSEUDOEPHEDRINE HCL 30 MG
100 TABLET ORAL 2 TIMES DAILY
Qty: 60 CAPSULE | Refills: 0 | Status: SHIPPED | OUTPATIENT
Start: 2025-03-19

## 2025-03-19 RX ORDER — HYDROCODONE BITARTRATE AND ACETAMINOPHEN 5; 325 MG/1; MG/1
2 TABLET ORAL EVERY 6 HOURS PRN
Status: DISCONTINUED | OUTPATIENT
Start: 2025-03-19 | End: 2025-03-19

## 2025-03-19 RX ORDER — OMEPRAZOLE 40 MG/1
40 CAPSULE, DELAYED RELEASE ORAL DAILY
Status: SHIPPED | COMMUNITY
Start: 2025-03-19

## 2025-03-19 RX ORDER — OXYCODONE HYDROCHLORIDE 5 MG/1
5 TABLET ORAL EVERY 6 HOURS PRN
Qty: 15 TABLET | Refills: 0 | Status: SHIPPED | OUTPATIENT
Start: 2025-03-19

## 2025-03-19 NOTE — PLAN OF CARE
Assumed care at 0730  A&Ox4, VSS, up with 1 and walker   Oxy and tylenol given for pain   Benadryl given   Simethicone given   Chest XR complete   L chest tube to water seal   JUAN DIEGO removed per order   Midline incision intact   Tolerating diet   Patient and family updated on POC   All questions answered   Call light within reach

## 2025-03-19 NOTE — CM/SW NOTE
Chief Complaint  Follow-up    Subjective        History of Present Illness  Dorita Narayan is a  57 y.o. female patient of Dr. Pillai here for follow up. History of familial polyposis, total colectomy with J-pouch in 1989 at [facility].  She also has a history of rheumatoid arthritis.    Presents today for follow-up.  In June she proceeded to [facility] after severe back pain. 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 Unfortunately there were no beds available in [place] and she was sent to [facility] in [place]. She states she underwent coil embolization of the splenic artery.  States she was told she has chronic pancreatitis as well as renal stones.  No records are available for review at this time.  She is scheduled to undergo mesenteric duplex in the next few months in [place].  We will have records requested from [facility] as well as [facility].     She does have a history of  biliary stent placement x2 by Dr. Margo Starks in the remote past and is concerned on whether she needs to have this repeated.    She complains and some nonspecific left upper quadrant discomfort told she was had splenomegaly and attributes it to this.  She does report a decreased appetite following recent interventions which has resulted in some weight loss.  She continues to struggle with abdominal bloating.  She denies nausea, vomiting, jaundice, acholic stools, intense pruritus.    Reports she is concerned over sulfasalazine causing nephrolithiasis.      She underwent EGD and ileoscopy 3/15/2022 which revealed Spigelman class II duodenal polyp with adenomatous changes, ileal polyps which were adenomatous. Repeat ileoscopy in 1 year.     Past Medical History:   Diagnosis Date   • Achilles tendonitis     RIGHT    • Anemia    • Arthritis     osteoarthritis, RA   • Bronchitis    • Cancer (HCC)     family history of colon cancer   • Colon polyp May 1989   • DVT (deep venous thrombosis) (HCC)     bilateral ankle, hip, lung   • Edema      SW met with pt at bedside for DC planning. Pt feeling well, ready to DC. Reviewed recommendations for HHC, pt politely declining. Is open to outpatient therapy orders being entered in case it is needed. Message sent to PT. Notified RADHA.     MARK Mohr       BLE AT TIMES TAKES LASIX   • Familial adenomatous polyposis    • Familial polyposis    • History of cellulitis 2019    LEFT LEG    • History of kidney stones    • History of migraine    • Pneumonia    • Spondylisthesis        Past Surgical History:   Procedure Laterality Date   • ACHILLES TENDON SURGERY Right 2020    Procedure: RIGHT CALCANEAL EXOSTECTOMY AND RETROCALCANEAL BURSECTOMY, SECONDARY ACHILLES TENDON REPAIR RECONSTRUCTION ACHILLES TENDON REPAIR;  Surgeon: Chetan Rodriguez MD;  Location:  [facility];  Service: Orthopedics;  Laterality: Right;   • BILE DUCT STENT PLACEMENT     • COLONOSCOPY     • CREATION / REVISION OF ILEOSTOMY / JEJUNOSTOMY     • ENDOSCOPY N/A 03/15/2022    Procedure: ESOPHAGOGASTRODUODENOSCOPY with saline lift and duodenum polypectomy and cold bx;  Surgeon: Marylou Pillai MD;  Location[facility];  Service: Gastroenterology;  Laterality: N/A;  PRE - heartburn, difficulty swallowing  POST - duodenum polyp, gastric polyps, esophagitis   • FLEXIBLE SIGMOIDOSCOPY  ???   • KIDNEY STONE SURGERY      X2   • RESECTION SMALL BOWEL / CLOSURE ILEOSTOMY     • SIGMOIDOSCOPY N/A 03/15/2022    Procedure: SIGMOIDOSCOPY FLEXIBLE to anastamosis with cold polypectomies;  Surgeon: Marylou Pillai MD;  Location: [facility] ENDOSCOPY;  Service: Gastroenterology;  Laterality: N/A;  PRE - familial polyposis  POST - polyps, hemorrhoids   • SPINE SURGERY      lumbar laminectomy decompression   • SPLENIC ARTERY EMBOLIZATION  2022   • TUBAL ABDOMINAL LIGATION     • UPPER GASTROINTESTINAL ENDOSCOPY  -current       Family History   Problem Relation Age of Onset   • Colon cancer Mother            • Colon cancer Father            • Malig Hyperthermia Neg Hx        Social History     Socioeconomic History   • Marital status:    Tobacco Use   • Smoking status: Former Smoker     Packs/day: 0.00     Years: 15.00     Pack years: 0.00     Types: Cigarettes     Start date:  "2007     Quit date: 2020     Years since quittin.0   • Smokeless tobacco: Never Used   • Tobacco comment: Started in late 20's quit off & on for 10yrs   Vaping Use   • Vaping Use: Never used   Substance and Sexual Activity   • Alcohol use: Not Currently     Comment: Once a yr   • Drug use: Never   • Sexual activity: Not Currently     Partners: Male     Comment: Menapause       Allergies   Allergen Reactions   • Iodine Rash   • Latex Other (See Comments)     SKIN BLISTERS   • Penicillins Hives       Current Outpatient Medications on File Prior to Visit   Medication Sig Dispense Refill   • Acetaminophen (TYLENOL EXTRA STRENGTH PO) Take 1,000 mg by mouth As Needed.     • cholecalciferol (VITAMIN D3) 25 MCG (1000 UT) tablet Take 2,000 Units by mouth Daily.     • methocarbamol (ROBAXIN) 750 MG tablet Take 750 mg by mouth 2 (Two) Times a Day As Needed.     • pantoprazole (PROTONIX) 20 MG EC tablet Take 1 tablet by mouth 2 (Two) Times a Day. 180 tablet 1   • sulfaSALAzine (AZULFIDINE) 500 MG tablet      • Apixaban (Eliquis DVT/PE Starter Pack) tablet Take two 5 mg tablets by mouth every 12 hours for 7 days. Followed by one 5 mg tablet every 12 hours. (Dispense starter pack if available) 74 tablet 0     No current facility-administered medications on file prior to visit.       Review of Systems     Objective   Vital Signs:   /80   Temp 98.3 °F (36.8 °C)   Ht 172.7 cm (68\")   Wt 112 kg (247 lb)   BMI 37.56 kg/m²       Physical Exam  Vitals and nursing note reviewed.   Constitutional:       General: She is not in acute distress.     Appearance: Normal appearance. She is obese. She is not ill-appearing.   HENT:      Head: Normocephalic and atraumatic.      Right Ear: External ear normal.      Left Ear: External ear normal.   Eyes:      General: No scleral icterus.     Conjunctiva/sclera: Conjunctivae normal.      Pupils: Pupils are equal, round, and reactive to light.   Pulmonary:      Effort: Pulmonary " effort is normal.   Musculoskeletal:      Cervical back: Normal range of motion and neck supple.   Skin:     General: Skin is warm and dry.   Neurological:      Mental Status: She is alert and oriented to person, place, and time.   Psychiatric:         Mood and Affect: Mood normal.         Behavior: Behavior normal.          Result Review :                             Assessment and Plan    Diagnoses and all orders for this visit:    1. Abdominal bloating (Primary)    2. FAP (familial adenomatous polyposis)    3. Gastroesophageal reflux disease, unspecified whether esophagitis present    4. Duodenal adenoma           57-year-old female with a history of familial polyposis status post total colectomy with J-pouch in 1989, RA for which she is on sulfasalazine who presents in follow-up.  Since her last visit she has been admitted to the hospital at [facility] in [place] for what she reports as a splenic infarct followed by coil embolization.  I have requested records.  Complaints today include decreased appetite due to things not tasting/smelling well and abdominal bloating.    Multiple questions regarding not only chronic pancreatitis but also nephrolithiasis.  Apparently she has a previous history of biliary stent placement x2 by Dr. Margo Starks in the remote past.  No biliary obstructive symptoms.  Benign abdominal exam.  She will be due for repeat ileoscopy secondary to duodenal adenoma in March 2023.    · Await records from [facility] in [place] from recent hospitalization.  · SIBO testing requested.  · Trial low FODMAP diet.  · She will require repeat ileoscopy in March 2023 due to duodenal adenoma.  · Encouraged nutritional supplementation (boost, Ensure, etc.) given her decreased p.o. intake recently.  · She is scheduled to follow-up with vascular surgery in [place] for repeat imaging within the next few months.  · Follow-up with Dr. Pillai in 3 months.  They know to contact sooner if any new  questions or concerns arise.        Follow Up   Return in about 3 months (around 10/18/2022) for Dr. Pillai.    Mavison dictation used throughout this note.     I spent 40 minutes caring for Dorita Narayan on this date of service. This time includes time spent by me in the following activities: preparing for the visit, reviewing tests, obtaining and/or reviewing a separately obtained history, performing a medically appropriate examination and/or evaluation , counseling and educating the patient/family/caregiver, ordering medications, tests, or procedures, documenting information in the medical record, independently interpreting results and communicating that information with the patient/family/caregiver and care coordination.      ESTIVEN Bender

## 2025-03-19 NOTE — PLAN OF CARE
Assumed care at 0730  A&Ox4, VSS, up with standby assist   Oxy and norco given for pain   Tolerating diet   Chest tube removed by surg onc team   Midline incision intact   Lovenox teaching given   Medications, prescriptions and AVS reviewed with patient and family- verbalized understanding   All questions answered            NURSING DISCHARGE NOTE    Discharged Home via Wheelchair.  Accompanied by Support staff  Belongings Taken by patient/family.

## 2025-03-19 NOTE — PROGRESS NOTES
POD#5 Resection recurrent GIST tumor with en-bloc partial resection of left diaphragm, partial pancreatectomy, and splenectomy. Cytoreduction multiple jejunal nodules. Left chest tube placement with complex repair left diaphragm. Intraoperative ultrasound. Lysis of adhesions.     Feeling well this morning. Had weird dreams with oxy last night, switched to Norco but pain worsening.     Blood pressure 124/79, pulse 98, temperature 98.5 °F (36.9 °C), temperature source Oral, resp. rate 18, height 1.676 m (5' 6\"), weight 77.1 kg (170 lb), last menstrual period 02/26/2025, SpO2 92%.    Intake/Output Summary (Last 24 hours) at 3/19/2025 1015  Last data filed at 3/19/2025 0700  Gross per 24 hour   Intake --   Output 1510 ml   Net -1510 ml     Lab Results   Component Value Date    WBC 14.5 03/19/2025    HGB 10.3 03/19/2025    HCT 31.0 03/19/2025    .0 03/19/2025    CREATSERUM 0.69 03/19/2025    BUN 6 03/19/2025     03/19/2025    K 3.2 03/19/2025    K 3.2 03/19/2025     03/19/2025    CO2 30.0 03/19/2025     03/19/2025    CA 9.2 03/19/2025    ALB 3.9 03/19/2025    ALKPHO 115 03/19/2025    BILT 0.4 03/19/2025    TP 6.5 03/19/2025    AST 26 03/19/2025    ALT 30 03/19/2025    PHOS 3.4 03/19/2025     Constitutional:  NAD.   Eyes: non-icteric.    Chest: Chest tube to water seal, serous output  Abdomen: Soft, non-tender, non-distended. Incision site healing well with minimal drainage on dressing.   Musculoskeletal: no edema.      - No acute surgical issues  - CXR stable this morning. Chest tube removed. Repeat CXR midday  - Pain: Tylenol, oxy prn, dilaudid prn  - Diet: low fiber soft  - GI prophy: famotidine  - DVT Prophy: Lovenox  - Encourage PT/OT/IS/Ambulation  - Splenectomy vaccines prior to discharge  - Discharge pending CXR, if stable post chest tube removal, OK to discharge later today    Patient seen and examined with Dr Indiana Damon PA

## 2025-03-19 NOTE — PLAN OF CARE
Assumed pt care at 1930  A&Ox4, able to make needs known  NSR on tele  CT to water seal  Pain managed w/ Tylenol and Oxy  Pt having vivid dreams and wanted to try different medication. PRN Norco added  PRN Benadryl given  Cxr done  Call light in reach  Bed in low position

## 2025-03-19 NOTE — PROGRESS NOTES
[facility] Hospitalist Progress Note                                                                     [facility]   [facility] of [facility]      Mark Stearns  2/24/1975    SUBJECTIVE: no chest pain, palpitations, shortness of breath, cough, nausea, vomiting. PT pain controlled.     OBJECTIVE:  Temp:  [98.3 °F (36.8 °C)-98.6 °F (37 °C)] 98.5 °F (36.9 °C)  Pulse:  [86-92] 89  Resp:  [18-24] 18  BP: (119-150)/(67-87) 150/85  SpO2:  [91 %-95 %] 95 %  Exam  Gen: No acute distress, alert and oriented   Pulm: Lungs clear bilaterally, normal respiratory effort, no crackles, no wheezing,.   CV: Heart with regular rate and rhythm, no murmur.   Abd: Abdomen soft, generalized post operative tenderness, nondistended, positive bowel sounds present  MSK: NO significant pitting edema or tenderness of the LE  Skin: no new rashes or lesions    Labs:   Recent Labs   Lab 03/15/25  0540 03/16/25  0525 03/17/25  0711 03/18/25  0540   WBC 16.1* 21.1* 19.1* 16.6*   HGB 11.3* 10.8* 10.6* 10.1*   MCV 98.0 96.1 97.0 96.9   .0 274.0 292.0 324.0       Recent Labs   Lab 03/15/25  0540 03/16/25  0525 03/17/25  0506 03/18/25  0540 03/19/25  0536    140 139 140 140   K 4.3 3.9 3.4* 3.8  3.8 3.2*  3.2*    105 103 104 103   CO2 27.0 28.0 30.0 31.0 30.0   BUN 6* <5* 8* 9 6*   CREATSERUM 0.76 0.71 0.64 0.69 0.69   CA 8.5* 8.8 9.1 9.0 9.2   MG 1.8 1.9  --   --   --    PHOS 3.3 2.2* 2.3* 2.5 3.4   * 119* 97 98 125*       Recent Labs   Lab 03/15/25  0540   ALT 48   AST 51*   ALB 4.1       No results for input(s): \"PGLU\" in the last 168 hours.    Meds:   Scheduled:    potassium phosphate dibasic 15 mmol in sodium chloride 0.9% 250 mL IVPB  15 mmol Intravenous Once    docusate sodium  100 mg Oral BID    enoxaparin  40 mg Subcutaneous Daily    famotidine  20 mg Oral BID    Or    famotidine  20 mg Intravenous BID    acetaminophen  1,000 mg Oral q6h     Continuous Infusions:        PRN:   HYDROcodone-acetaminophen **OR** HYDROcodone-acetaminophen    simethicone    HYDROmorphone **OR** HYDROmorphone **OR** HYDROmorphone    pneumococcal 20-Tigist conj vacc    haemophilus b polysac conj vac    meningococcal A C Y&W-135 olig    meningococcal B recomb OMV adj    oxyCODONE **OR** oxyCODONE **OR** oxyCODONE    metoclopramide **OR** metoclopramide    ondansetron **OR** ondansetron    influenza virus vaccine PF    diphenhydrAMINE **OR** diphenhydrAMINE    ASSESSMENT / PLAN:   50 year old female with history of gerd, hld, IBS, renal cell carcinoma s/p nephrectomy, 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      Recurrent metastatic gastrointestinal stromal tumor   -POD # 4 s/p resection of recurrent/metastatic gastrointestinal stromal tumor with en block partial resection of left diaphragm, partial distal pancreatectomy, splenectomy, cytoreduction of multiple jejunal nodes, complex repair of left diaphragm, left chest tube and lysis of adhesions.   --surgical oncology following  -diet per surg onc--> soft, tolerating  -encourage ambulation   -CT and drain management per surg/onc--cxr showing pneumo with improvement, follow surg/onc rec on chest tube removal --> repeat cxr pending results today      Post Operative Pain  -acetaminophen po atc  -hydromorphone iv prn--> stopped  -oxy po prn --> increase dose due to uncontrolled pain --> improved--> changed to norco  -ketamine--> stopped    Nausea--> resolved  -zofran and compazine prn --> pt intolerant  -reglan prn     Gerd  -famotidine     Constipation  -colace  -Senna  -Miralax prn      Quality:  DVT Prophylaxis: scd,  lovenox  CODE status:   Freed:      Plan of care discussed with patient and staff     Dispo: discharge per surg/onc     Jose Buitrago MD  [facility] Hospitalist  [phone]

## 2025-03-20 ENCOUNTER — TELEPHONE (OUTPATIENT)
Dept: SURGERY | Facility: CLINIC | Age: 50
End: 2025-03-20

## 2025-03-20 NOTE — TELEPHONE ENCOUNTER
Called to check on patient after hospital discharge.  Patient stated her breathing felt a little heavy and she was having pain.  I let her know that she could increase the oxy to 10 mg, but pt stated she was going to leave at the 5 mg.  I encouraged her to go to the ED for the breathing.  Patient refused saying it was not that bad that she went for a walk and was o.k.  Also encouraged patient to use her incentive spirometer.  I let her know that if it didn't get any better or worsened to be evaluated in the ED. I asked patient if the home health nurse came by and she stated she did not have home health.  It looks like patient declined.  She stated she did not know what it was and thought they were sending a PT to her house.  I scheduled patient a post op appointment for tomorrow to be evaluated in the office.

## 2025-03-20 NOTE — PAYOR COMM NOTE
--------------  DISCHARGE REVIEW    Payor: [facility]  Subscriber #:  [id number]  Authorization Number: [id number]    Admit date: 3/14/25  Admit time:   9:40 AM  Discharge Date: 3/19/2025  5:12 PM     Admitting Physician: Tomás Be MD  Attending Physician:  Keyla att. providers found  Primary Care Physician: Prachi Arenas MD       Discharge Summary Notes    No notes of this type exist for this encounter.         REVIEWER COMMENTS      home

## 2025-03-21 ENCOUNTER — HOSPITAL ENCOUNTER (OUTPATIENT)
Dept: GENERAL RADIOLOGY | Facility: HOSPITAL | Age: 50
Discharge: HOME | End: 2025-03-21
Payer: COMMERCIAL

## 2025-03-21 ENCOUNTER — TELEPHONE (OUTPATIENT)
Dept: CASE MANAGEMENT | Facility: HOSPITAL | Age: 50
End: 2025-03-21

## 2025-03-21 ENCOUNTER — OFFICE VISIT (OUTPATIENT)
Dept: SURGERY | Facility: CLINIC | Age: 50
End: 2025-03-21
Payer: COMMERCIAL

## 2025-03-21 VITALS
RESPIRATION RATE: 16 BRPM | OXYGEN SATURATION: 96 % | SYSTOLIC BLOOD PRESSURE: 133 MMHG | DIASTOLIC BLOOD PRESSURE: 85 MMHG | TEMPERATURE: 98 F | HEART RATE: 88 BPM

## 2025-03-21 DIAGNOSIS — J95.811 POSTPROCEDURAL PNEUMOTHORAX: Primary | ICD-10-CM

## 2025-03-21 DIAGNOSIS — J95.811 POSTPROCEDURAL PNEUMOTHORAX: ICD-10-CM

## 2025-03-21 DIAGNOSIS — C49.A0 GASTROINTESTINAL STROMAL TUMOR (GIST) (HCC): ICD-10-CM

## 2025-03-21 LAB
ALBUMIN SERPL-MCNC: 4.3 G/DL (ref 3.2–4.8)
ALBUMIN/GLOB SERPL: 1.4 {RATIO} (ref 1–2)
ALP LIVER SERPL-CCNC: 231 U/L
ALT SERPL-CCNC: 54 U/L
ANION GAP SERPL CALC-SCNC: 18 MMOL/L (ref 0–18)
AST SERPL-CCNC: 58 U/L (ref ?–34)
BASOPHILS # BLD AUTO: 0.09 X10(3) UL (ref 0–0.2)
BASOPHILS NFR BLD AUTO: 0.5 %
BILIRUB SERPL-MCNC: 0.3 MG/DL (ref 0.3–1.2)
BUN BLD-MCNC: 7 MG/DL (ref 9–23)
CALCIUM BLD-MCNC: 9.3 MG/DL (ref 8.7–10.6)
CHLORIDE SERPL-SCNC: 101 MMOL/L (ref 98–112)
CO2 SERPL-SCNC: 19 MMOL/L (ref 21–32)
CREAT BLD-MCNC: 0.66 MG/DL
EGFRCR SERPLBLD CKD-EPI 2021: 108 ML/MIN/1.73M2 (ref 60–?)
EOSINOPHIL # BLD AUTO: 1.06 X10(3) UL (ref 0–0.7)
EOSINOPHIL NFR BLD AUTO: 6.4 %
ERYTHROCYTE [DISTWIDTH] IN BLOOD BY AUTOMATED COUNT: 13.8 %
FASTING STATUS PATIENT QL REPORTED: NO
GLOBULIN PLAS-MCNC: 3.1 G/DL (ref 2–3.5)
GLUCOSE BLD-MCNC: 113 MG/DL (ref 70–99)
HCT VFR BLD AUTO: 34.5 %
HGB BLD-MCNC: 11.6 G/DL
IMM GRANULOCYTES # BLD AUTO: 0.36 X10(3) UL (ref 0–1)
IMM GRANULOCYTES NFR BLD: 2.2 %
LYMPHOCYTES # BLD AUTO: 2.15 X10(3) UL (ref 1–4)
LYMPHOCYTES NFR BLD AUTO: 13 %
MCH RBC QN AUTO: 31.4 PG (ref 26–34)
MCHC RBC AUTO-ENTMCNC: 33.6 G/DL (ref 31–37)
MCV RBC AUTO: 93.5 FL
MONOCYTES # BLD AUTO: 1.76 X10(3) UL (ref 0.1–1)
MONOCYTES NFR BLD AUTO: 10.6 %
NEUTROPHILS # BLD AUTO: 11.17 X10 (3) UL (ref 1.5–7.7)
NEUTROPHILS # BLD AUTO: 11.17 X10(3) UL (ref 1.5–7.7)
NEUTROPHILS NFR BLD AUTO: 67.3 %
OSMOLALITY SERPL CALC.SUM OF ELEC: 285 MOSM/KG (ref 275–295)
PLATELET # BLD AUTO: 675 10(3)UL (ref 150–450)
POTASSIUM SERPL-SCNC: 4.6 MMOL/L (ref 3.5–5.1)
PROT SERPL-MCNC: 7.4 G/DL (ref 5.7–8.2)
RBC # BLD AUTO: 3.69 X10(6)UL
SODIUM SERPL-SCNC: 138 MMOL/L (ref 136–145)
WBC # BLD AUTO: 16.6 X10(3) UL (ref 4–11)

## 2025-03-21 PROCEDURE — 3079F DIAST BP 80-89 MM HG: CPT

## 2025-03-21 PROCEDURE — 80053 COMPREHEN METABOLIC PANEL: CPT

## 2025-03-21 PROCEDURE — 71046 X-RAY EXAM CHEST 2 VIEWS: CPT

## 2025-03-21 PROCEDURE — 85025 COMPLETE CBC W/AUTO DIFF WBC: CPT

## 2025-03-21 PROCEDURE — 99024 POSTOP FOLLOW-UP VISIT: CPT

## 2025-03-21 PROCEDURE — 3075F SYST BP GE 130 - 139MM HG: CPT

## 2025-03-21 RX ORDER — OXYCODONE HYDROCHLORIDE 10 MG/1
10 TABLET ORAL EVERY 4 HOURS PRN
Qty: 30 TABLET | Refills: 0 | Status: SHIPPED | OUTPATIENT
Start: 2025-03-21

## 2025-03-21 NOTE — PROGRESS NOTES
[facility] Surgical Oncology      Patient Name:  Mark Stearns   YOB: 1975   Gender:  Female   Appt Date:  3/21/2025   Provider:  MAXWELL Rose     PATIENT PROVIDERS  Referring Provider: Jakub Reddy MD    Primary Care Provider:Prachi Arenas MD   Address: [address] [place] [address] [address] Megan DANIELS [id number]   Phone #: [phone]       CHIEF COMPLAINT  Chief Complaint   Patient presents with    Post-Op        PROBLEMS  Reviewed   Patient Active Problem List   Diagnosis    Gastrointestinal stromal tumor (GIST) (HCC)    GERD (gastroesophageal reflux disease)    H/O right nephrectomy    Seasonal allergies    Secondary malignancy of parietal peritoneum (HCC)    Diarrhea    GIST (gastrointestinal stromal tumor), malignant (HCC)    Malignant neoplasm metastatic to liver (HCC)    Primary hypertension    Renal cell carcinoma (HCC)    Metastatic adenocarcinoma (HCC)    Colon cancer screening    GIST, malignant (HCC)        History of Present Illness:  Metastatic GIST    Oncology history:  2002 - incidentally found small bowel GIST (age 27) when undergoing a left nephrectomy for RCC; evaluated at [facility] [place] [place] by Dr. Wagner and given 1 yr of adjuvant imatinib followed by serial imaging    7/31/20 - CT w/ subtle recurrent intraperitoneal mass lesion, including 5.6 x 4.0 x 5.1 cm L central mesenteric mass w/ central necrosis c/f recurrent GIST    8/20/20 - resection at [facility] ([place]) of ~20 peritoneal masses, 1 adherent to sigmoid colon; 4 main tumors (largest ~7 cm, smallest ~2 cm), and 2 cm tumor inside of small bowel mesentery, no tumors ruptured, 16 very tiny tumors (mostly on surface of small intestine); NSG w/ exon 11 KIT [id number]; path w/ GIST, spindle cell type, low grade (mitotic rate 0/5 mm2); peritoneal masses were 6.8 cm and 4.2 cm; mesenteric nodule was 2.8 cm; assorted peritoneal nodules were < 0.7 cm; R pelvic mass was 3.3 cm    10/2/20 - started on imatinib by Yesica  Luisito at [facility]  11/11/20 - genetic counseling, germline testing negative    11/27/20 - CT w/ single residual mass LUQ lesion, slightly decreased; one additional very small possible nodule adjacent to duodenal jejunal junction; mass either in posterior peritoneum or RP in LUQ at caudal margin of spleen, 3.0 x 2.3 cm    12/4/20 - only on brand name Gleevec x1 mo w/ tingling on scalp, periorbital edema, and facial tingling; MR brain negative; switched to generic imatinib w/ significant improvement in sx; takes imatinib w/ Compazine to help nausea    2/22/21 - CT w/ mild decrease of LUQ mass adjacent to distal duodenum, likely residual GIST; no e/o mets; mild dcrease in mass in posterior peritoneum vs RP, 2.8 x 1.6 cm, less conspicuous adjacent nodularity    6/21/21 - CT w/ LUQ mass no significant change; no new lesions; nodularity adjacent to 3rd/4th portion of duodenum similar, 9 x 7 mm    9/24/21 - CT w/ increased nodules from proximal descending colon and 4th portin of duodenum c/w enlarging GIST tumors    11/5/21 - CT w/ increased nodules size abutting 4th portion of duodenum/proximal descending colon; stable 0.8 cm nodule in medial R abdominis rectus muscle c/f tumor; 3.2 x 2.4 cm nodule inferior to spleen/proximal descending colon; 1.8 x 1.3 cm nodule anterior to 3rd/4th portion of duodenum; 0.8 x 0.6 cm nodule in medial aspect of R abdominis muscle    11/20/21 - increased imatinib to 400 mg BID  1/3/22 - slightly increased L-sided abdominal pain  1/25/22 - CT stable  4/29/22 CT AP: stable  8/1/22 CT AP: stable  12/6/22 CT AP  No significant change in heterogeneous mass in the left retroperitoneum and small soft  tissue nodule in the right rectus abdominis muscle. No CT evidence of new metastatic disease.  4/17/23 CT AP: stable exam  12/5/23 CT AP stable exam  4/23/24 CT AP  Increase in size of mass in the left renal fossa   Guardant KIT [id number] del, exon 11  6/3/24 day 1 cycle 1 sunatenib   11/13/2025: Interval  decrease in size of the left perisplenic/renal fossa mass and lesion within the right rectus abdominis muscle.   2/11/2025: Continued decrease in size  3/14/2025: Resection recurrent GIST tumor with en-bloc partial resection of left diaphragm, partial pancreatectomy, and splenectomy. Cytoreduction multiple jejunal nodules. Left chest tube placement with complex repair left diaphragm. Intraoperative ultrasound. Lysis of adhesions. --> Pathology Pending  She is doing oxycodone 5 mg every 4 hours along with tylenol 1,000 mg every 6 hours. Her shortness of breath has been persistent since leaving the hospital. Since discharge, the pain has been about the same. Her pain was better controlled on 10 mg oxycodone in the hospital. The pain improved with walking. She is not having chest pain with the shortness of breath. She had egg in the morning, small portion of mac n cheese, and pear cup. She finished 2 full water bottles. She started having small bowel movements yesterday, no blood or dark tarry stool.     Vital Signs:  /90 (Patient Position: Standing, Cuff Size: adult)   Pulse 107   Temp 98 °F (36.7 °C)   Resp 16   LMP 02/26/2025 (Exact Date)   SpO2 97%      Medications Reviewed:    Current Outpatient Medications:     docusate sodium 100 MG Oral Cap, Take 100 mg by mouth 2 (two) times daily., Disp: 60 capsule, Rfl: 0    Omeprazole 40 MG Oral Capsule Delayed Release, Take 1 capsule (40 mg total) by mouth daily., Disp: , Rfl:     oxyCODONE 5 MG Oral Tab, Take 1 tablet (5 mg total) by mouth every 6 (six) hours as needed., Disp: 15 tablet, Rfl: 0    enoxaparin 40 MG/0.4ML Injection Solution Prefilled Syringe, Inject 0.4 mL (40 mg total) into the skin daily., Disp: 24 each, Rfl: 0    metoclopramide 10 MG Oral Tab, Take 1 tablet (10 mg total) by mouth every 6 (six) hours as needed., Disp: 12 tablet, Rfl: 0    ondansetron (ZOFRAN) 4 mg tablet, Take 1 tablet (4 mg total) by mouth every 6 (six) hours as needed., Disp:  12 tablet, Rfl: 0    Naloxone HCl 4 MG/0.1ML Nasal Liquid, 4 mg by Nasal route as needed. If patient remains unresponsive, repeat dose in other nostril 2-5 minutes after first dose., Disp: 1 kit, Rfl: 0    amLODIPine 5 MG Oral Tab, Take 1 tablet (5 mg total) by mouth daily., Disp: , Rfl:      Allergies Reviewed:  Allergies[1]     History:  Reviewed:  Past Medical History:    Abdominal hernia    Abdominal pain    Arthritis    Bloating    Decorative tattoo    Esophageal reflux    Fatigue    Flatulence/gas pain/belching    Gastrointestinal stromal tumor (GIST) (HCC)    followed at [facility], on chemo    Headache disorder    Heartburn    High blood pressure    Hx of motion sickness    IBS (irritable bowel syndrome)    Indigestion    Leaking of urine    Pain with bowel movements    Personal history of antineoplastic chemotherapy    LAST ORAL TREATMENT    PONV (postoperative nausea and vomiting)    Renal cell carcinoma, left (HCC)    Renal disorder    RIGHT KIDNEY ONLY, LEFT KIDNEY REMOVED 2003    Stool incontinence    Stress    Uncomfortable fullness after meals    Visual impairment    GLASSES    Wears glasses    Weight loss      Reviewed:  Past Surgical History:   Procedure Laterality Date    Bowel resection  08/20/2020    25 tumors resected from small bowel    Colonoscopy N/A 03/11/2025    Procedure: COLONOSCOPY with biopsies;  Surgeon: Keyshawn Marshall MD;  Location: [facility] ENDOSCOPY    Nephrectomy Left 11/2002    partial small bowel resection    Other surgical history  03/14/2025    Resection of recurrent/metastatic gastrointestinal stromal tumor with en bloc partial resection of left diaphragm, partial distal pancreatectomy, and splenectomy.   2.       Cytoreduction of multiple jejunal nodules.   3.       Complex repair of left diaphragm.   4.       Left chest tube placement. 5.       Intraoperative ultrasound. 6.       Lysis of adhesions.      Reviewed Social History:  Social History     Socioeconomic History    Marital  status:    Tobacco Use    Smoking status: Never    Smokeless tobacco: Never   Vaping Use    Vaping status: Never Used   Substance and Sexual Activity    Alcohol use: Yes     Comment: 1X/MONTH    Drug use: Never   Social History Narrative    , 2 children.  Worked as  at [employer]. 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  Walking for exercise.      Social Drivers of Health     Food Insecurity: No Food Insecurity (3/14/2025)    NCSS - Food Insecurity     Worried About Running Out of Food in the Last Year: No     Ran Out of Food in the Last Year: No   Transportation Needs: No Transportation Needs (3/14/2025)    NCSS - Transportation     Lack of Transportation: No   Housing Stability: Not At Risk (3/14/2025)    NCSS - Housing/Utilities     Has Housing: Yes     Worried About Losing Housing: No     Unable to Get Utilities: No      Reviewed:  Family History   Problem Relation Age of Onset    Diabetes Mother     Thyroid Disorder Sister     Cancer Son 1        Wilms      Review of Systems:  Doing well post-operatively  Denies fever or chills  Denies chest pain. + SOB  + abdominal pain, nausea. Denies vomiting  Denies dysuria or hematuria  Denies warmth, erythema, or drainage at incision       Physical Examination:  Constitutional:  NAD.   Eyes: non-icteric.    Lungs: CTAB  Cardiovascular: Heart Auscultation: RRR.   Abdomen: Soft, non-tender, non-distended. Incision healing well without drainage or erythema.  Musculoskeletal: no edema.     Document Review:  CXR 3/21/2025:  CONCLUSION:    1. 13 mm left apical pneumothorax, previously 8 mm.  2. Bibasilar atelectasis and small left pleural effusion.     Procedure(s):  None      Assessment / Plan:  Metastatic GIST  Post-Procedural Pneumothorax  - No acute surgical issues    - Incision site healing well without erythema or drainage. Staple removal 2 weeks from surgery    - CXR with increase in size of pneumothorax from 8 mm to 13 mm. O2 in the office  97%, VSS. Appears that shortness of breath is primarily associated with pain. Will increase oxycodone from 5 mg to 10 mg q4hrs with tylenol 1,000 mg q6hrs. Will repeat CXR this afternoon to ensure stability. Advised to keep chest tube site covered with vaseline gauze and pressure tape until at least tomorrow. Will repeat CXR this afternoon to ensure there is no worsening of pneumothorax.     - Labs significant for slight increase in leukocytosis, but patient is s/p splenectomy with no fevers or chills. Hgb improved. LFTs slightly up. Cr stable, electrolytes WNL.     - Reviewed return precautions, when to call the office, and signs and symptoms to report to the ER in depth with patient.     - Will plan for FU Monday with CBC, CMP and LA    - Pathology pending from surgery. Once finalized will discuss with patient and review at our group tumor board meeting.       Follow Up:  Pending CXR this afternoon       Electronically Signed by: MAXWELL Rose           [1]   Allergies  Allergen Reactions    Codeine ITCHING, SHORTNESS OF BREATH, SWELLING and TONGUE SWELLING    Opioid Analgesics ITCHING, OTHER (SEE COMMENTS) and SHORTNESS OF BREATH    Penicillins RASH, SHORTNESS OF BREATH, SWELLING and TONGUE SWELLING

## 2025-03-21 NOTE — CM/SW NOTE
SW notified by Surg[facility] of need for HHC at [facility]. Pt in agreement with recommendations. [facility]Y reopened HH referral and sent messages to agencies if able to accept.     MARK Mohr

## 2025-03-21 NOTE — DISCHARGE SUMMARY
[facility]  Discharge Summary    Mark Stearns Patient Status:  Inpatient    1975 MRN [id number]   Location [facility] 7NE-A Attending No att. providers found   Hosp Day # 5 PCP Prachi Arenas MD     Date of Admission: 3/14/2025    Date of Discharge: 3/19/2025  5:12 PM    Admitting Diagnosis: Gastrointestinal stromal tumor (GIST) (HCC) [C49.A0]  GIST, malignant (HCC)    Discharge Diagnosis:   Patient Active Problem List   Diagnosis    Gastrointestinal stromal tumor (GIST) (HCC)    GERD (gastroesophageal reflux disease)    H/O right nephrectomy    Seasonal allergies    Secondary malignancy of parietal peritoneum (HCC)    Diarrhea    GIST (gastrointestinal stromal tumor), malignant (HCC)    Malignant neoplasm metastatic to liver (HCC)    Primary hypertension    Renal cell carcinoma (HCC)    Metastatic adenocarcinoma (HCC)    Colon cancer screening    GIST, malignant (HCC)       Reason for Admission: Surgical Management    Physical Exam:     Constitutional:  NAD.   Eyes: non-icteric.    Chest: Chest tube to water seal, serous output  Abdomen: Soft, non-tender, non-distended. Incision site healing well with minimal drainage on dressing.   Musculoskeletal: no edema.    History of Present Illness:     Patient is a 49-year-old woman who is currently being referred for consideration of surgical management of metastatic GIST.  Oncology history:   - incidentally found small bowel GIST (age 27) when undergoing a left nephrectomy for RCC; evaluated at [facility] [place] by Dr. Wagner and given 1 yr of adjuvant imatinib followed by serial imaging    20 - CT w/ subtle recurrent intraperitoneal mass lesion, including 5.6 x 4.0 x 5.1 cm L central mesenteric mass w/ central necrosis c/f recurrent GIST    20 - resection at [facility] ([place]) of ~20 peritoneal masses, 1 adherent to sigmoid colon; 4 main tumors (largest ~7 cm, smallest ~2 cm), and 2 cm tumor inside of small bowel mesentery, no tumors  ruptured, 16 very tiny tumors (mostly on surface of small intestine); NSG w/ exon 11 KIT [id number]; path w/ GIST, spindle cell type, low grade (mitotic rate 0/5 mm2); peritoneal masses were 6.8 cm and 4.2 cm; mesenteric nodule was 2.8 cm; assorted peritoneal nodules were < 0.7 cm; R pelvic mass was 3.3 cm    10/2/20 - started on imatinib by Yesica Jorge at [facility]  11/11/20 - genetic counseling, germline testing negative    11/27/20 - CT w/ single residual mass LUQ lesion, slightly decreased; one additional very small possible nodule adjacent to duodenal jejunal junction; mass either in posterior peritoneum or RP in LUQ at caudal margin of spleen, 3.0 x 2.3 cm    12/4/20 - only on brand name Gleevec x1 mo w/ tingling on scalp, periorbital edema, and facial tingling; MR brain negative; switched to generic imatinib w/ significant improvement in sx; takes imatinib w/ Compazine to help nausea    2/22/21 - CT w/ mild decrease of LUQ mass adjacent to distal duodenum, likely residual GIST; no e/o mets; mild dcrease in mass in posterior peritoneum vs RP, 2.8 x 1.6 cm, less conspicuous adjacent nodularity    6/21/21 - CT w/ LUQ mass no significant change; no new lesions; nodularity adjacent to 3rd/4th portion of duodenum similar, 9 x 7 mm    9/24/21 - CT w/ increased nodules from proximal descending colon and 4th portin of duodenum c/w enlarging GIST tumors    11/5/21 - CT w/ increased nodules size abutting 4th portion of duodenum/proximal descending colon; stable 0.8 cm nodule in medial R abdominis rectus muscle c/f tumor; 3.2 x 2.4 cm nodule inferior to spleen/proximal descending colon; 1.8 x 1.3 cm nodule anterior to 3rd/4th portion of duodenum; 0.8 x 0.6 cm nodule in medial aspect of R abdominis muscle    11/20/21 - increased imatinib to 400 mg BID  1/3/22 - slightly increased L-sided abdominal pain  1/25/22 - CT stable  4/29/22 CT AP: stable  8/1/22 CT AP: stable  12/6/22 CT AP  No significant change in  heterogeneous mass in the left retroperitoneum and small soft  tissue nodule in the right rectus abdominis muscle. No CT evidence of new metastatic disease.  4/17/23 CT AP: stable exam  12/5/23 CT AP stable exam  4/23/24 CT AP  Increase in size of mass in the left renal fossa   Guardant KIT [id number] del, exon 11  6/3/24 day 1 cycle 1 sunatenib   11/13/2025: Interval decrease in size of the left perisplenic/renal fossa mass and lesion within the right rectus abdominis muscle.   2/11/2025: Continued decrease in size    Hospital Course:  Patient underwent Resection recurrent GIST tumor with en-bloc partial resection of left diaphragm, partial pancreatectomy, and splenectomy. Cytoreduction multiple jejunal nodules. Left chest tube placement with complex repair left diaphragm. Intraoperative ultrasound. Lysis of adhesions.  Postoperative course was remarkable for pneumothorax noted on CXR. By POD#5, imaging was stable in size and chest tube was removed.  Patient tolerated advances in her diet nicely.  Pain was controlled. She was given splenectomy vaccines prior to discharge.       Consultations: Hospitalist, PT, OT,     Procedures: As above    Complications: Pneumothorax - stable    Disposition: Home or Self Care    Discharge Condition: Good    Discharge Medications:   Discharge Medication List as of 3/19/2025  4:33 PM        START taking these medications    Details   docusate sodium 100 MG Oral Cap Take 100 mg by mouth 2 (two) times daily., Normal, Disp-60 capsule, R-0      enoxaparin 40 MG/0.4ML Injection Solution Prefilled Syringe Inject 0.4 mL (40 mg total) into the skin daily., Normal, Disp-24 each, R-0      metoclopramide 10 MG Oral Tab Take 1 tablet (10 mg total) by mouth every 6 (six) hours as needed., Normal, Disp-12 tablet, R-0      ondansetron (ZOFRAN) 4 mg tablet Take 1 tablet (4 mg total) by mouth every 6 (six) hours as needed., Normal, Disp-12 tablet, R-0      Naloxone HCl 4 MG/0.1ML Nasal Liquid 4 mg by  Nasal route as needed. If patient remains unresponsive, repeat dose in other nostril 2-5 minutes after first dose., Normal, Disp-1 kit, R-0           CONTINUE these medications which have CHANGED    Details   Omeprazole 40 MG Oral Capsule Delayed Release Take 1 capsule (40 mg total) by mouth daily., Historical      oxyCODONE 5 MG Oral Tab Take 1 tablet (5 mg total) by mouth every 6 (six) hours as needed., Normal, Disp-15 tablet, R-0           CONTINUE these medications which have NOT CHANGED    Details   amLODIPine 5 MG Oral Tab Take 1 tablet (5 mg total) by mouth daily., Historical           STOP taking these medications       SUNItinib Malate 25 MG Oral Cap        Na Sulfate-K Sulfate-Mg Sulf (SUPREP BOWEL PREP KIT) 17.5-3.13-1.6 GM/177ML Oral Solution        neomycin 500 MG Oral Tab        metroNIDAZOLE (FLAGYL) 500 MG Oral Tab              Follow up Visits: Follow-up with MAXWELL Rose 1 week    Follow up Labs: CBC and CMP    MAXWELL Rose  3/21/2025  10:30 AM

## 2025-03-24 ENCOUNTER — OFFICE VISIT (OUTPATIENT)
Dept: PHYSICAL THERAPY | Facility: HOSPITAL | Age: 50
End: 2025-03-24
Attending: SURGERY
Payer: COMMERCIAL

## 2025-03-24 ENCOUNTER — HOSPITAL ENCOUNTER (OUTPATIENT)
Dept: GENERAL RADIOLOGY | Facility: HOSPITAL | Age: 50
Discharge: HOME | End: 2025-03-24
Attending: PHYSICIAN ASSISTANT
Payer: COMMERCIAL

## 2025-03-24 ENCOUNTER — OFFICE VISIT (OUTPATIENT)
Dept: SURGERY | Facility: CLINIC | Age: 50
End: 2025-03-24
Payer: COMMERCIAL

## 2025-03-24 VITALS
SYSTOLIC BLOOD PRESSURE: 115 MMHG | RESPIRATION RATE: 16 BRPM | BODY MASS INDEX: 26 KG/M2 | WEIGHT: 163 LBS | TEMPERATURE: 98 F | HEART RATE: 115 BPM | DIASTOLIC BLOOD PRESSURE: 74 MMHG | OXYGEN SATURATION: 94 %

## 2025-03-24 DIAGNOSIS — Z74.09 DECREASED FUNCTIONAL MOBILITY AND ENDURANCE: Primary | ICD-10-CM

## 2025-03-24 DIAGNOSIS — J95.811 POSTPROCEDURAL PNEUMOTHORAX: ICD-10-CM

## 2025-03-24 DIAGNOSIS — R11.2 NAUSEA AND VOMITING, UNSPECIFIED VOMITING TYPE: ICD-10-CM

## 2025-03-24 DIAGNOSIS — J95.811 POSTPROCEDURAL PNEUMOTHORAX: Primary | ICD-10-CM

## 2025-03-24 DIAGNOSIS — C49.A0 GASTROINTESTINAL STROMAL TUMOR (GIST) (HCC): ICD-10-CM

## 2025-03-24 DIAGNOSIS — C78.7 MALIGNANT NEOPLASM METASTATIC TO LIVER (HCC): ICD-10-CM

## 2025-03-24 LAB
ALBUMIN SERPL-MCNC: 4.9 G/DL (ref 3.2–4.8)
ALBUMIN/GLOB SERPL: 1.4 {RATIO} (ref 1–2)
ALP LIVER SERPL-CCNC: 288 U/L
ALT SERPL-CCNC: 52 U/L
ANION GAP SERPL CALC-SCNC: 11 MMOL/L (ref 0–18)
AST SERPL-CCNC: 40 U/L (ref ?–34)
BASOPHILS # BLD AUTO: 0.07 X10(3) UL (ref 0–0.2)
BASOPHILS NFR BLD AUTO: 0.4 %
BILIRUB SERPL-MCNC: 0.3 MG/DL (ref 0.3–1.2)
BUN BLD-MCNC: 11 MG/DL (ref 9–23)
CALCIUM BLD-MCNC: 10.2 MG/DL (ref 8.7–10.6)
CHLORIDE SERPL-SCNC: 98 MMOL/L (ref 98–112)
CO2 SERPL-SCNC: 27 MMOL/L (ref 21–32)
CREAT BLD-MCNC: 0.75 MG/DL
EGFRCR SERPLBLD CKD-EPI 2021: 98 ML/MIN/1.73M2 (ref 60–?)
EOSINOPHIL # BLD AUTO: 0.34 X10(3) UL (ref 0–0.7)
EOSINOPHIL NFR BLD AUTO: 1.8 %
ERYTHROCYTE [DISTWIDTH] IN BLOOD BY AUTOMATED COUNT: 13.8 %
FASTING STATUS PATIENT QL REPORTED: NO
GLOBULIN PLAS-MCNC: 3.4 G/DL (ref 2–3.5)
GLUCOSE BLD-MCNC: 107 MG/DL (ref 70–99)
HCT VFR BLD AUTO: 37.3 %
HGB BLD-MCNC: 12.3 G/DL
IMM GRANULOCYTES # BLD AUTO: 0.21 X10(3) UL (ref 0–1)
IMM GRANULOCYTES NFR BLD: 1.1 %
LIPASE SERPL-CCNC: 47 U/L (ref 12–53)
LYMPHOCYTES # BLD AUTO: 1.94 X10(3) UL (ref 1–4)
LYMPHOCYTES NFR BLD AUTO: 10.5 %
MCH RBC QN AUTO: 30.9 PG (ref 26–34)
MCHC RBC AUTO-ENTMCNC: 33 G/DL (ref 31–37)
MCV RBC AUTO: 93.7 FL
MONOCYTES # BLD AUTO: 1.07 X10(3) UL (ref 0.1–1)
MONOCYTES NFR BLD AUTO: 5.8 %
NEUTROPHILS # BLD AUTO: 14.83 X10 (3) UL (ref 1.5–7.7)
NEUTROPHILS # BLD AUTO: 14.83 X10(3) UL (ref 1.5–7.7)
NEUTROPHILS NFR BLD AUTO: 80.4 %
OSMOLALITY SERPL CALC.SUM OF ELEC: 282 MOSM/KG (ref 275–295)
PLATELET # BLD AUTO: 1142 10(3)UL (ref 150–450)
PLATELETS.RETICULATED NFR BLD AUTO: 2.5 % (ref 0–7)
POTASSIUM SERPL-SCNC: 4.9 MMOL/L (ref 3.5–5.1)
PROT SERPL-MCNC: 8.3 G/DL (ref 5.7–8.2)
RBC # BLD AUTO: 3.98 X10(6)UL
SODIUM SERPL-SCNC: 136 MMOL/L (ref 136–145)
WBC # BLD AUTO: 18.5 X10(3) UL (ref 4–11)

## 2025-03-24 PROCEDURE — 97530 THERAPEUTIC ACTIVITIES: CPT

## 2025-03-24 PROCEDURE — 85025 COMPLETE CBC W/AUTO DIFF WBC: CPT | Performed by: PHYSICIAN ASSISTANT

## 2025-03-24 PROCEDURE — 99024 POSTOP FOLLOW-UP VISIT: CPT | Performed by: PHYSICIAN ASSISTANT

## 2025-03-24 PROCEDURE — 97162 PT EVAL MOD COMPLEX 30 MIN: CPT

## 2025-03-24 PROCEDURE — 71046 X-RAY EXAM CHEST 2 VIEWS: CPT | Performed by: PHYSICIAN ASSISTANT

## 2025-03-24 PROCEDURE — 80053 COMPREHEN METABOLIC PANEL: CPT | Performed by: PHYSICIAN ASSISTANT

## 2025-03-24 PROCEDURE — 3078F DIAST BP <80 MM HG: CPT | Performed by: PHYSICIAN ASSISTANT

## 2025-03-24 PROCEDURE — 3074F SYST BP LT 130 MM HG: CPT | Performed by: PHYSICIAN ASSISTANT

## 2025-03-24 PROCEDURE — 83690 ASSAY OF LIPASE: CPT | Performed by: PHYSICIAN ASSISTANT

## 2025-03-24 RX ORDER — METOCLOPRAMIDE 10 MG/1
10 TABLET ORAL 3 TIMES DAILY PRN
Qty: 20 TABLET | Refills: 0 | Status: SHIPPED | OUTPATIENT
Start: 2025-03-24

## 2025-03-24 NOTE — PROGRESS NOTES
[facility] Surgical Oncology      Patient Name:  Mark Stearns   YOB: 1975   Gender:  Female   Appt Date:  3/24/2025   Provider:  MAXWELL Murcia      PATIENT PROVIDERS  Referring Provider: Jakub Reddy MD    Primary Care Provider:Prachi Arenas MD   Address: [address] [place] [address] [address] Megan [address]S [id number]   Phone #: [phone]       CHIEF COMPLAINT  Chief Complaint   Patient presents with    Post-Op        PROBLEMS  Reviewed   Patient Active Problem List   Diagnosis    Gastrointestinal stromal tumor (GIST) (HCC)    GERD (gastroesophageal reflux disease)    H/O right nephrectomy    Seasonal allergies    Secondary malignancy of parietal peritoneum (HCC)    Diarrhea    GIST (gastrointestinal stromal tumor), malignant (HCC)    Malignant neoplasm metastatic to liver (HCC)    Primary hypertension    Renal cell carcinoma (HCC)    Metastatic adenocarcinoma (HCC)    Colon cancer screening    GIST, malignant (HCC)    Postprocedural pneumothorax    Nausea and vomiting        History of Present Illness:  Metastatic GIST    Oncology history:  2002 - incidentally found small bowel GIST (age 27) when undergoing a left nephrectomy for RCC; evaluated at [facility] by Dr. Wagner and given 1 yr of adjuvant imatinib followed by serial imaging    7/31/20 - CT w/ subtle recurrent intraperitoneal mass lesion, including 5.6 x 4.0 x 5.1 cm L central mesenteric mass w/ central necrosis c/f recurrent GIST    8/20/20 - resection at [facility] (Kristy) of ~20 peritoneal masses, 1 adherent to sigmoid colon; 4 main tumors (largest ~7 cm, smallest ~2 cm), and 2 cm tumor inside of small bowel mesentery, no tumors ruptured, 16 very tiny tumors (mostly on surface of small intestine); NSG w/ exon 11 KIT [id number]; path w/ GIST, spindle cell type, low grade (mitotic rate 0/5 mm2); peritoneal masses were 6.8 cm and 4.2 cm; mesenteric nodule was 2.8 cm; assorted peritoneal nodules were < 0.7 cm; R pelvic mass was  3.3 cm    10/2/20 - started on imatinib by Yesica Jorge at [facility]  11/11/20 - genetic counseling, germline testing negative    11/27/20 - CT w/ single residual mass LUQ lesion, slightly decreased; one additional very small possible nodule adjacent to duodenal jejunal junction; mass either in posterior peritoneum or RP in LUQ at caudal margin of spleen, 3.0 x 2.3 cm    12/4/20 - only on brand name Gleevec x1 mo w/ tingling on scalp, periorbital edema, and facial tingling; MR brain negative; switched to generic imatinib w/ significant improvement in sx; takes imatinib w/ Compazine to help nausea    2/22/21 - CT w/ mild decrease of LUQ mass adjacent to distal duodenum, likely residual GIST; no e/o mets; mild dcrease in mass in posterior peritoneum vs RP, 2.8 x 1.6 cm, less conspicuous adjacent nodularity    6/21/21 - CT w/ LUQ mass no significant change; no new lesions; nodularity adjacent to 3rd/4th portion of duodenum similar, 9 x 7 mm    9/24/21 - CT w/ increased nodules from proximal descending colon and 4th portin of duodenum c/w enlarging GIST tumors    11/5/21 - CT w/ increased nodules size abutting 4th portion of duodenum/proximal descending colon; stable 0.8 cm nodule in medial R abdominis rectus muscle c/f tumor; 3.2 x 2.4 cm nodule inferior to spleen/proximal descending colon; 1.8 x 1.3 cm nodule anterior to 3rd/4th portion of duodenum; 0.8 x 0.6 cm nodule in medial aspect of R abdominis muscle    11/20/21 - increased imatinib to 400 mg BID  1/3/22 - slightly increased L-sided abdominal pain  1/25/22 - CT stable  4/29/22 CT AP: stable  8/1/22 CT AP: stable  12/6/22 CT AP  No significant change in heterogeneous mass in the left retroperitoneum and small soft  tissue nodule in the right rectus abdominis muscle. No CT evidence of new metastatic disease.  4/17/23 CT AP: stable exam  12/5/23 CT AP stable exam  4/23/24 CT AP  Increase in size of mass in the left renal fossa   Guardant KIT [id number] del, exon  11  6/3/24 day 1 cycle 1 sunatenib   11/13/2025: Interval decrease in size of the left perisplenic/renal fossa mass and lesion within the right rectus abdominis muscle.   2/11/2025: Continued decrease in size  3/14/2025: Resection recurrent GIST tumor with en-bloc partial resection of left diaphragm, partial pancreatectomy, and splenectomy. Cytoreduction multiple jejunal nodules. Left chest tube placement with complex repair left diaphragm. Intraoperative ultrasound. Lysis of adhesions. --> Pathology Pending as of 3/24/2025  Pt states that her pain has overall improved since last Friday. She is still using oxycodone 10mg but feels that her pain is adequately controlled with this and is now trying to increase the intervals between doses. When she does have pain it is primarily in the epigastric and LUQ of her abdomen near chest tube site. States that she feels her breathing is significantly improved. Denies any fevers or chills.   She was doing well with PO intake of soft diet until Sunday morning. She ate a peeled apple with peanut butter the became nausea and had a non-bloody emesis. Later tried eating chicken and noodles from chinese restaurant, then became nauseas again w/ repeat vomiting w/ some relief of symptoms. Has not been passing has but is having some small looser bowel movements, denies melena or BRBPR. Attempted to eat saltine this AM but again felt nauseas and vomiting. Has been tolerating small sips of water later this morning. Has not taken anything for nausea. Reports hx of HA w/ zofran, requesting, reglan instead.      Vital Signs:      3/24/2025    12:19 PM 3/24/2025    12:20 PM   Vitals History   /75 115/74   BP Location Right arm    Patient Position Sitting Standing   Cuff Size adult adult   Pulse 106 115   Resp 16    Temp 98.1 °F (36.7 °C)    SpO2 96 % 94 %   Weight 163 lbs    BMI 26.31 kg/m2          Medications Reviewed:    Current Outpatient Medications:     metoclopramide 10 MG Oral  Tab, Take 1 tablet (10 mg total) by mouth 3 (three) times daily as needed., Disp: 20 tablet, Rfl: 0    oxyCODONE 10 MG Oral Tab, Take 1 tablet (10 mg total) by mouth every 4 (four) hours as needed for Pain., Disp: 30 tablet, Rfl: 0    docusate sodium 100 MG Oral Cap, Take 100 mg by mouth 2 (two) times daily., Disp: 60 capsule, Rfl: 0    Omeprazole 40 MG Oral Capsule Delayed Release, Take 1 capsule (40 mg total) by mouth daily., Disp: , Rfl:     oxyCODONE 5 MG Oral Tab, Take 1 tablet (5 mg total) by mouth every 6 (six) hours as needed., Disp: 15 tablet, Rfl: 0    enoxaparin 40 MG/0.4ML Injection Solution Prefilled Syringe, Inject 0.4 mL (40 mg total) into the skin daily., Disp: 24 each, Rfl: 0    ondansetron (ZOFRAN) 4 mg tablet, Take 1 tablet (4 mg total) by mouth every 6 (six) hours as needed., Disp: 12 tablet, Rfl: 0    Naloxone HCl 4 MG/0.1ML Nasal Liquid, 4 mg by Nasal route as needed. If patient remains unresponsive, repeat dose in other nostril 2-5 minutes after first dose., Disp: 1 kit, Rfl: 0    amLODIPine 5 MG Oral Tab, Take 1 tablet (5 mg total) by mouth daily., Disp: , Rfl:      Allergies Reviewed:  Allergies[1]     History:  Reviewed:  Past Medical History:    Abdominal hernia    Abdominal pain    Arthritis    Bloating    Decorative tattoo    Esophageal reflux    Fatigue    Flatulence/gas pain/belching    Gastrointestinal stromal tumor (GIST) (HCC)    followed at [facility], on chemo    Headache disorder    Heartburn    High blood pressure    Hx of motion sickness    IBS (irritable bowel syndrome)    Indigestion    Leaking of urine    Pain with bowel movements    Personal history of antineoplastic chemotherapy    LAST ORAL TREATMENT    PONV (postoperative nausea and vomiting)    Renal cell carcinoma, left (HCC)    Renal disorder    RIGHT KIDNEY ONLY, LEFT KIDNEY REMOVED 2003    Stool incontinence    Stress    Uncomfortable fullness after meals    Visual impairment    GLASSES    Wears glasses    Weight loss       Reviewed:  Past Surgical History:   Procedure Laterality Date    Bowel resection  08/20/2020    25 tumors resected from small bowel    Colonoscopy N/A 03/11/2025    Procedure: COLONOSCOPY with biopsies;  Surgeon: Keyshawn Marshall MD;  Location: [facility] ENDOSCOPY    Nephrectomy Left 11/2002    partial small bowel resection    Other surgical history  03/14/2025    Resection of recurrent/metastatic gastrointestinal stromal tumor with en bloc partial resection of left diaphragm, partial distal pancreatectomy, and splenectomy.   2.       Cytoreduction of multiple jejunal nodules.   3.       Complex repair of left diaphragm.   4.       Left chest tube placement. 5.       Intraoperative ultrasound. 6.       Lysis of adhesions.      Reviewed Social History:  Social History     Socioeconomic History    Marital status:    Tobacco Use    Smoking status: Never    Smokeless tobacco: Never   Vaping Use    Vaping status: Never Used   Substance and Sexual Activity    Alcohol use: Yes     Comment: 1X/MONTH    Drug use: Never   Social History Narrative    , 2 children.  Worked as  at [employer]. 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  Walking for exercise.      Social Drivers of Health     Food Insecurity: No Food Insecurity (3/14/2025)    NCSS - Food Insecurity     Worried About Running Out of Food in the Last Year: No     Ran Out of Food in the Last Year: No   Transportation Needs: No Transportation Needs (3/14/2025)    NCSS - Transportation     Lack of Transportation: No   Housing Stability: Not At Risk (3/14/2025)    NCSS - Housing/Utilities     Has Housing: Yes     Worried About Losing Housing: No     Unable to Get Utilities: No      Reviewed:  Family History   Problem Relation Age of Onset    Diabetes Mother     Thyroid Disorder Sister     Cancer Son 1        Wilms      Review of Systems:  Doing well post-operatively  Denies fever or chills  Denies chest pain or SOB  + nausea and vomiting  Denies  dysuria or hematuria  Denies warmth, erythema, or drainage at incision       Physical Examination:  Constitutional:  NAD.   Eyes: non-icteric.    Lungs: CTAB. CT site redressed w/ vaseline dressing, healing well w/o erythema or drainage.   Cardiovascular: Heart Auscultation: RRR.   Abdomen: Soft, non-tender, non-distended. Incision healing well without drainage or erythema. Staples in place.   Musculoskeletal: no edema.     Document Review:  CXR 3/21/2025:  CONCLUSION:    1. 13 mm left apical pneumothorax, previously 8 mm.  2. Bibasilar atelectasis and small left pleural effusion.     Procedure(s):  None      Assessment / Plan:  Metastatic GIST  - Pathology pending from surgery. Once finalized will discuss with patient and review at our group tumor board meeting.   - New nausea. Await CBC/CMP/Lipase. Will trial PO challenge after reglan. Abd soft, nontender, do not think acute abdomen. Pt passing stool, doubt acute obstruction. If abnormal labs or fails PO challenge may need IVF vs ER. Pt lives nearby, will get exams and we will call her w/ results.    Post-Procedural Pneumothorax  - Repeat CXR pending today. Continue occlusive dressing until PTX resolved.   - Symptomatically significantly improved.    - Reviewed return precautions, when to call the office, and signs and symptoms to report to the ER in depth with patient.     -Will discuss w/ Dr. Be.      Addendum: Pt is feeling better after Reglan- she is now tolerating water, gatorade, and crackers. Lipase WNL, do not think pancreatitis, creatinine/BUN stable, do not think dehydration, electrolytes ok. Pt w/ persistent mild tachycardia- no dizziness or lightheadedness. Will check in with pt tomorrow, if still poor PO intake then will bring in for IVF. ER for worsening sx overnight. Repeat labs on Thursday. Discussed w/ Dr. Be. Monitor plts, cont Lovenox for now no ASA.        Follow Up:  Pending CXR, labs, and PO challenge.       Electronically Signed by:  MAXWELL Murcia             [1]   Allergies  Allergen Reactions    Codeine ITCHING, SHORTNESS OF BREATH, SWELLING and TONGUE SWELLING    Opioid Analgesics ITCHING, OTHER (SEE COMMENTS) and SHORTNESS OF BREATH    Penicillins RASH, SHORTNESS OF BREATH, SWELLING and TONGUE SWELLING

## 2025-03-25 ENCOUNTER — TELEPHONE (OUTPATIENT)
Dept: SURGERY | Facility: CLINIC | Age: 50
End: 2025-03-25

## 2025-03-25 NOTE — TELEPHONE ENCOUNTER
Patient called back and state that she was doing better yesterday with fluids and saltine crackers. Today she report nausea after eating cottage cheese and canned peaches. Patient denies vomiting at this time. She has taken reglan and her PCP prescribed a low dose zofran for break through nausea. She has not started taking the zofran. Clare ARREOLA was notified and per PA patient should come in for fluids and labs tomorrow. Patient was called and notified of her appointment at 9:00am Patient verbalized understanding.

## 2025-03-25 NOTE — PROGRESS NOTES
LOWER EXTREMITY EVALUATION:     Diagnosis:   Decreased functional mobility and endurance (Z74.09) Patient:  Mark Stearns (50 year old, female)        Referring Provider: Tomás Be  Today's Date   3/24/2025    Precautions:  Cancer   Date of Evaluation: 03/24/25  Next MD visit: none currently scheduled  Date of Surgery: 3/14/25     PATIENT SUMMARY   Summary of chief complaints: Pain, decreased appetite, and weakness/deconditioning s/p abdominal surgery.  History of current condition: S/p resection recurrent GIST tumor with en-bloc partial resection of left diaphragm, partial pancreatectomy, and splenectomy. Cytoreduction multiple jejunal nodules. Left chest tube placement with complex repair left diaphragm. Intraoperative ultrasound. Lysis of adhesions. Recently had L pneumothorax post-op.   Pain level: current 6 /10, at best 3 /10, at worst 9 /10  Description of symptoms: Difficulty walking, currently using walker since surgery (was independent prior). Abdominal pain and nausea since surgery.  States she has been trying to be up and moving since surgery.  Lives in a single story home and has been able to manage the one small step to enter/exit. Has step in tub and is able to navigate this with assistance from her .  Is using shower chair for bathing.  Has riser currently for the toilet. Is currently sleeping in a recliner - states she has a high bed and is unable to get in/out of bet at this time. C/o abdominal pain with movement which she states has been improving slowly since surgery but is still significant.   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  Pt goals: return to prior level of function    Mark  has a past medical history of Abdominal hernia, Abdominal pain, Arthritis, Bloating, Decorative tattoo, Esophageal reflux, Fatigue, Flatulence/gas pain/belching, Gastrointestinal stromal tumor (GIST) (HCC) (04/04/2020), Headache disorder, Heartburn (05/2020), High blood pressure, motion sickness, IBS (irritable bowel syndrome), Indigestion (5/2020), Leaking of urine, Pain with bowel movements, Personal history of antineoplastic chemotherapy (02/12/2025), PONV (postoperative nausea and vomiting), Renal cell carcinoma, left (HCC) (11/05/2002), Renal disorder, Stool incontinence, Stress, Uncomfortable fullness after meals, Visual impairment, Wears glasses, and Weight loss.  She  has a past surgical history that includes nephrectomy (Left, 11/2002); bowel resection (08/20/2020); colonoscopy (N/A, 03/11/2025); and other surgical history (03/14/2025).    ASSESSMENT  Mark presents to physical therapy evaluation with primary c/o Pain, decreased appetite, and weakness/deconditioning s/p abdominal surgery.. The results of the objective tests and measures show are consistent with medically referred diagnosis s/p resection recurrent GIST tumor with en-bloc partial resection of left diaphragm, partial pancreatectomy, and splenectomy. Cytoreduction multiple jejunal nodules. Left chest tube placement with complex repair left diaphragm. Intraoperative ultrasound. Lysis of adhesions. Patient is demonstrating decreased functional mobility, decreased strength (partially due to pain inhibition), decreased endurance, and impaired giat.. Functional deficits include but are not limited to difficulty walking, pain/difficulty with transitional movements, modifed sleeping (currently sleeping in recliner), not currenlty able to perform ADL such as light lifting, reaching, pushing, pulling.. Signs and  symptoms are consistent with diagnosis of Decreased functional mobility and endurance (Z74.09). Pt and PT discussed evaluation findings, pathology, POC and HEP.  Pt voiced understanding and performs HEP correctly without reported pain. Skilled Physical Therapy is medically necessary to address the above impairments and reach functional goals.    OBJECTIVE:    Musculoskeletal  Observation: forward head and shoulder posturing  Palpation: mild tenderness to palpation throughout abdomen with bruising visible due to blood thinner injections        ROM and Strength: (* denotes performed with pain)  Hip   ROM MMT (-/5)    R L R L     Flex (L2)     4 4     Ext      NT NT     Abd     NT NT     ER 45 45 4 4     IR 30 30 4 4    ,   Knee   MMT (-/5)    R L     Flex 4- 4-     Ext (L3) 4 4     ,   Ankle/Foot   MMT (-/5)    R L     PF 5 5     DF (L4) 5 5     Inversion         Eversion         Grt Toe Ext (L5)                Balance and Functional Mobility:  Gait: pt ambulates on level ground with assistive device; other (use comment) (using wheeled walker, demonstrating guarded gait with decreased gait speed)        Today's Treatment and Response:   Pt education was provided on exam findings, treatment diagnosis, treatment plan, expectations, and prognosis.  Today's Treatment       3/24/2025   LE Treatment   Therapeutic Activity Pt edu in and rehearsal of proper mechanics for sit<>supine performing log roll technique to minimize strain on abdominal musculature   Additional Treatment Screening of UE strength and mobility: pt noting mild pain/pull in upper incision with AROM flexion and abduction.  MMT flxn and abd limited to 4/5 due to presence of abdominal pain with resisted testing.  ER/IR=5/5 B.   Therapeutic Activity Minutes 10   Total Time Of Timed Procedures 10   Total Time Of Service-Based Procedures 0   Total Treatment Time 10        Patient was instructed in and issued a HEP for:      Charges:  PT EVAL: Moderate  Complexity, TA  In agreement with evaluation findings and clinical rationale, this evaluation involved MODERATE COMPLEXITY decision making due to 1-2 personal factors/comorbidities, 3 or more body structures involved/activity limitations, and evolving symptoms as documented in the evaluation.                                                                                                                PLAN OF CARE:    Goals: (to be met in 16 visits)   Patient demonstrating ability to perform sit<>supine transfer independently using log roll technique for greater independence with transitional movements  Patient demonstrating ability to perform sit<>stand transfer independently without UE support for greater functional strength and independence with ADL  Patient demonstrating hip/LE strength 5/5 to promote greater ease with ADL such as prolonged walking and transitional movements  Patient demonstrating normalized, independent gait with increased endurance, tolerating >20 minutes of walking   Patient demonstrating improved core strength >=2/5 for Sahrmann low ab testing with ability to perform proper abdominal bracing without requiring verbal/tactile cuing for improved core stabilization with ADL   Patient demonstrating improved functional strength, reporting ability to perform ADL such as loading/unloading the  and light lifting activities without pain or difficutly.     Frequency / Duration: Patient will be seen 2x/week or a total of 16  visits over a 90 day period. Treatment will include: Gait training; Manual Therapy; Neuromuscular Re-education; Therapeutic Activities; Therapeutic Exercise; Home Exercise Program instruction    Education or treatment limitation: None   Rehab Potential: good       Patient/Family/Caregiver was advised of these findings, precautions, and treatment options and has agreed to actively participate in planning and for this course of care.    Thank you for your referral. Please  co-sign or sign and return this letter via fax as soon as possible to [phone]. If you have any questions, please contact me at Dept: [phone]    Sincerely,  Electronically signed by therapist: Marquita Beaulieu PT  Physician's certification required: Yes  I certify the need for these services furnished under this plan of treatment and while under my care.    X___________________________________________________ Date____________________    Certification From: 3/24/2025  To:6/22/2025

## 2025-03-26 ENCOUNTER — OFFICE VISIT (OUTPATIENT)
Dept: SURGERY | Facility: CLINIC | Age: 50
End: 2025-03-26
Payer: COMMERCIAL

## 2025-03-26 ENCOUNTER — OFFICE VISIT (OUTPATIENT)
Age: 50
End: 2025-03-26
Attending: PHYSICIAN ASSISTANT
Payer: COMMERCIAL

## 2025-03-26 VITALS
RESPIRATION RATE: 16 BRPM | DIASTOLIC BLOOD PRESSURE: 77 MMHG | SYSTOLIC BLOOD PRESSURE: 122 MMHG | WEIGHT: 161 LBS | OXYGEN SATURATION: 92 % | HEART RATE: 86 BPM | BODY MASS INDEX: 25.88 KG/M2 | HEIGHT: 65.98 IN

## 2025-03-26 DIAGNOSIS — C49.A0 GASTROINTESTINAL STROMAL TUMOR (GIST) (HCC): Primary | ICD-10-CM

## 2025-03-26 DIAGNOSIS — R11.2 NAUSEA AND VOMITING, UNSPECIFIED VOMITING TYPE: Primary | ICD-10-CM

## 2025-03-26 DIAGNOSIS — J95.811 POSTPROCEDURAL PNEUMOTHORAX: ICD-10-CM

## 2025-03-26 DIAGNOSIS — C49.A0 GASTROINTESTINAL STROMAL TUMOR (GIST) (HCC): ICD-10-CM

## 2025-03-26 LAB
ALBUMIN SERPL-MCNC: 4.8 G/DL (ref 3.2–4.8)
ALBUMIN/GLOB SERPL: 1.5 {RATIO} (ref 1–2)
ALP LIVER SERPL-CCNC: 278 U/L
ALT SERPL-CCNC: 39 U/L
ANION GAP SERPL CALC-SCNC: 9 MMOL/L (ref 0–18)
AST SERPL-CCNC: 31 U/L (ref ?–34)
BASOPHILS # BLD AUTO: 0.08 X10(3) UL (ref 0–0.2)
BASOPHILS NFR BLD AUTO: 0.6 %
BILIRUB SERPL-MCNC: 0.3 MG/DL (ref 0.3–1.2)
BUN BLD-MCNC: 13 MG/DL (ref 9–23)
CALCIUM BLD-MCNC: 10.2 MG/DL (ref 8.7–10.6)
CHLORIDE SERPL-SCNC: 101 MMOL/L (ref 98–112)
CO2 SERPL-SCNC: 29 MMOL/L (ref 21–32)
CREAT BLD-MCNC: 0.84 MG/DL
EGFRCR SERPLBLD CKD-EPI 2021: 86 ML/MIN/1.73M2 (ref 60–?)
EOSINOPHIL # BLD AUTO: 0.53 X10(3) UL (ref 0–0.7)
EOSINOPHIL NFR BLD AUTO: 3.8 %
ERYTHROCYTE [DISTWIDTH] IN BLOOD BY AUTOMATED COUNT: 13.5 %
FASTING STATUS PATIENT QL REPORTED: NO
GLOBULIN PLAS-MCNC: 3.3 G/DL (ref 2–3.5)
GLUCOSE BLD-MCNC: 109 MG/DL (ref 70–99)
HCT VFR BLD AUTO: 36.3 %
HGB BLD-MCNC: 12 G/DL
IMM GRANULOCYTES # BLD AUTO: 0.13 X10(3) UL (ref 0–1)
IMM GRANULOCYTES NFR BLD: 0.9 %
LYMPHOCYTES # BLD AUTO: 2.1 X10(3) UL (ref 1–4)
LYMPHOCYTES NFR BLD AUTO: 15.1 %
MCH RBC QN AUTO: 31.1 PG (ref 26–34)
MCHC RBC AUTO-ENTMCNC: 33.1 G/DL (ref 31–37)
MCV RBC AUTO: 94 FL
MONOCYTES # BLD AUTO: 1.11 X10(3) UL (ref 0.1–1)
MONOCYTES NFR BLD AUTO: 8 %
NEUTROPHILS # BLD AUTO: 9.94 X10 (3) UL (ref 1.5–7.7)
NEUTROPHILS # BLD AUTO: 9.94 X10(3) UL (ref 1.5–7.7)
NEUTROPHILS NFR BLD AUTO: 71.6 %
OSMOLALITY SERPL CALC.SUM OF ELEC: 289 MOSM/KG (ref 275–295)
PLATELET # BLD AUTO: 1264 10(3)UL (ref 150–450)
POTASSIUM SERPL-SCNC: 3.9 MMOL/L (ref 3.5–5.1)
PROT SERPL-MCNC: 8.1 G/DL (ref 5.7–8.2)
RBC # BLD AUTO: 3.86 X10(6)UL
SODIUM SERPL-SCNC: 139 MMOL/L (ref 136–145)
WBC # BLD AUTO: 13.9 X10(3) UL (ref 4–11)

## 2025-03-26 PROCEDURE — 99024 POSTOP FOLLOW-UP VISIT: CPT | Performed by: PHYSICIAN ASSISTANT

## 2025-03-26 NOTE — PROGRESS NOTES
Education Record    Learner:  Patient    Disease / Diagnosis:PL+IV fluids     Barriers / Limitations:  None   Comments:    Method:  Brief focused   Comments:    General Topics:  Diet, Infection, Medication, Pain, Precautions, Procedure, Side effects and symptom management, Plan of care reviewed, and Fall risk and prevention   Comments:    Outcome:  Shows understanding   Comments:    Clare ARREOLA with surgery came and spoke with patient regarding her Plan of care . Patient received 1 liter of fluids

## 2025-03-27 ENCOUNTER — TELEPHONE (OUTPATIENT)
Dept: SURGERY | Facility: CLINIC | Age: 50
End: 2025-03-27

## 2025-03-27 ENCOUNTER — APPOINTMENT (OUTPATIENT)
Dept: CT IMAGING | Facility: HOSPITAL | Age: 50
End: 2025-03-27
Attending: EMERGENCY MEDICINE
Payer: COMMERCIAL

## 2025-03-27 ENCOUNTER — HOSPITAL ENCOUNTER (EMERGENCY)
Facility: HOSPITAL | Age: 50
Discharge: HOME | End: 2025-03-27
Attending: EMERGENCY MEDICINE
Payer: COMMERCIAL

## 2025-03-27 VITALS
HEIGHT: 66 IN | BODY MASS INDEX: 25.71 KG/M2 | SYSTOLIC BLOOD PRESSURE: 117 MMHG | WEIGHT: 160 LBS | TEMPERATURE: 99 F | DIASTOLIC BLOOD PRESSURE: 81 MMHG | HEART RATE: 79 BPM | OXYGEN SATURATION: 100 % | RESPIRATION RATE: 22 BRPM

## 2025-03-27 DIAGNOSIS — C78.7 MALIGNANT NEOPLASM METASTATIC TO LIVER (HCC): ICD-10-CM

## 2025-03-27 DIAGNOSIS — R11.2 NAUSEA AND VOMITING IN ADULT: Primary | ICD-10-CM

## 2025-03-27 LAB
ALBUMIN SERPL-MCNC: 4.6 G/DL (ref 3.2–4.8)
ALBUMIN/GLOB SERPL: 1.5 {RATIO} (ref 1–2)
ALP LIVER SERPL-CCNC: 248 U/L
ALT SERPL-CCNC: 32 U/L
ANION GAP SERPL CALC-SCNC: 9 MMOL/L (ref 0–18)
AST SERPL-CCNC: 32 U/L (ref ?–34)
BASOPHILS # BLD AUTO: 0.09 X10(3) UL (ref 0–0.2)
BASOPHILS NFR BLD AUTO: 0.6 %
BILIRUB SERPL-MCNC: 0.2 MG/DL (ref 0.3–1.2)
BILIRUB UR QL STRIP.AUTO: NEGATIVE
BUN BLD-MCNC: 9 MG/DL (ref 9–23)
CALCIUM BLD-MCNC: 10.1 MG/DL (ref 8.7–10.6)
CHLORIDE SERPL-SCNC: 101 MMOL/L (ref 98–112)
CO2 SERPL-SCNC: 30 MMOL/L (ref 21–32)
COLOR UR AUTO: YELLOW
CREAT BLD-MCNC: 0.81 MG/DL
EGFRCR SERPLBLD CKD-EPI 2021: 89 ML/MIN/1.73M2 (ref 60–?)
EOSINOPHIL # BLD AUTO: 0.4 X10(3) UL (ref 0–0.7)
EOSINOPHIL NFR BLD AUTO: 2.9 %
ERYTHROCYTE [DISTWIDTH] IN BLOOD BY AUTOMATED COUNT: 13.3 %
GLOBULIN PLAS-MCNC: 3.1 G/DL (ref 2–3.5)
GLUCOSE BLD-MCNC: 109 MG/DL (ref 70–99)
GLUCOSE UR STRIP.AUTO-MCNC: NORMAL MG/DL
HCT VFR BLD AUTO: 36 %
HGB BLD-MCNC: 11.7 G/DL
IMM GRANULOCYTES # BLD AUTO: 0.1 X10(3) UL (ref 0–1)
IMM GRANULOCYTES NFR BLD: 0.7 %
KETONES UR STRIP.AUTO-MCNC: 40 MG/DL
LEUKOCYTE ESTERASE UR QL STRIP.AUTO: 25
LYMPHOCYTES # BLD AUTO: 2.37 X10(3) UL (ref 1–4)
LYMPHOCYTES NFR BLD AUTO: 17.1 %
MCH RBC QN AUTO: 30.3 PG (ref 26–34)
MCHC RBC AUTO-ENTMCNC: 32.5 G/DL (ref 31–37)
MCV RBC AUTO: 93.3 FL
MONOCYTES # BLD AUTO: 1.13 X10(3) UL (ref 0.1–1)
MONOCYTES NFR BLD AUTO: 8.1 %
NEUTROPHILS # BLD AUTO: 9.79 X10 (3) UL (ref 1.5–7.7)
NEUTROPHILS # BLD AUTO: 9.79 X10(3) UL (ref 1.5–7.7)
NEUTROPHILS NFR BLD AUTO: 70.6 %
NITRITE UR QL STRIP.AUTO: NEGATIVE
OSMOLALITY SERPL CALC.SUM OF ELEC: 289 MOSM/KG (ref 275–295)
PH UR STRIP.AUTO: 5.5 [PH] (ref 5–8)
PLATELET # BLD AUTO: 1258 10(3)UL (ref 150–450)
PLATELETS.RETICULATED NFR BLD AUTO: 2.6 % (ref 0–7)
POTASSIUM SERPL-SCNC: 3.5 MMOL/L (ref 3.5–5.1)
PROT SERPL-MCNC: 7.7 G/DL (ref 5.7–8.2)
PROT UR STRIP.AUTO-MCNC: 30 MG/DL
RBC # BLD AUTO: 3.86 X10(6)UL
SODIUM SERPL-SCNC: 140 MMOL/L (ref 136–145)
SP GR UR STRIP.AUTO: >1.03 (ref 1–1.03)
UROBILINOGEN UR STRIP.AUTO-MCNC: NORMAL MG/DL
WBC # BLD AUTO: 13.9 X10(3) UL (ref 4–11)

## 2025-03-27 PROCEDURE — 80053 COMPREHEN METABOLIC PANEL: CPT | Performed by: EMERGENCY MEDICINE

## 2025-03-27 PROCEDURE — 96360 HYDRATION IV INFUSION INIT: CPT

## 2025-03-27 PROCEDURE — 99284 EMERGENCY DEPT VISIT MOD MDM: CPT

## 2025-03-27 PROCEDURE — 99285 EMERGENCY DEPT VISIT HI MDM: CPT

## 2025-03-27 PROCEDURE — 96361 HYDRATE IV INFUSION ADD-ON: CPT

## 2025-03-27 PROCEDURE — 81001 URINALYSIS AUTO W/SCOPE: CPT | Performed by: EMERGENCY MEDICINE

## 2025-03-27 PROCEDURE — 85025 COMPLETE CBC W/AUTO DIFF WBC: CPT | Performed by: EMERGENCY MEDICINE

## 2025-03-27 PROCEDURE — S0119 ONDANSETRON 4 MG: HCPCS | Performed by: EMERGENCY MEDICINE

## 2025-03-27 PROCEDURE — 74177 CT ABD & PELVIS W/CONTRAST: CPT | Performed by: EMERGENCY MEDICINE

## 2025-03-27 RX ORDER — ONDANSETRON 4 MG/1
4 TABLET, ORALLY DISINTEGRATING ORAL ONCE
Status: COMPLETED | OUTPATIENT
Start: 2025-03-27 | End: 2025-03-27

## 2025-03-27 RX ORDER — OXYCODONE HYDROCHLORIDE 10 MG/1
1 TABLET, COATED ORAL EVERY 6 HOURS PRN
Qty: 15 TABLET | Refills: 0 | Status: SHIPPED | OUTPATIENT
Start: 2025-03-27

## 2025-03-27 NOTE — ED INITIAL ASSESSMENT (HPI)
Pt had surgery on 3/14, reports she is having trouble keeping food down, c/o vomiting, states her vomitus had \"black specs\" in it, denies current nausea, reports constipation, last BM this AM, c/o abd bloating vidhi to LUQ, BUQ pain

## 2025-03-27 NOTE — ED PROVIDER NOTES
Patient Seen in: [facility] Emergency Department      History     Chief Complaint   Patient presents with    Nausea/Vomiting/Diarrhea     Stated Complaint: nausea/vomiting, recent surgery - pneumothorax on 3/14    Subjective:   HPI      Patient here for above complaints, recently operated on by Dr. Hernandez.  Since leaving the hospital she endorses poor p.o. intake ongoing nausea, scant flatus and bowel movements.  Got to the point where she cannot take p.o.  Call the office advised to come into the ER for CT scan.  No fevers no chills.    Objective:     Past Medical History:    Abdominal hernia    Abdominal pain    Arthritis    Bloating    Decorative tattoo    Esophageal reflux    Fatigue    Flatulence/gas pain/belching    Gastrointestinal stromal tumor (GIST) (HCC)    followed at [facility], on chemo    Headache disorder    Heartburn    High blood pressure    Hx of motion sickness    IBS (irritable bowel syndrome)    Indigestion    Leaking of urine    Pain with bowel movements    Personal history of antineoplastic chemotherapy    LAST ORAL TREATMENT    PONV (postoperative nausea and vomiting)    Renal cell carcinoma, left (HCC)    Renal disorder    RIGHT KIDNEY ONLY, LEFT KIDNEY REMOVED 2003    Stool incontinence    Stress    Uncomfortable fullness after meals    Visual impairment    GLASSES    Wears glasses    Weight loss              Past Surgical History:   Procedure Laterality Date    Bowel resection  08/20/2020    25 tumors resected from small bowel    Colonoscopy N/A 03/11/2025    Procedure: COLONOSCOPY with biopsies;  Surgeon: Keyshawn Marshall MD;  Location:  ENDOSCOPY    Nephrectomy Left 11/2002    partial small bowel resection    Other surgical history  03/14/2025    Resection of recurrent/metastatic gastrointestinal stromal tumor with en bloc partial resection of left diaphragm, partial distal pancreatectomy, and splenectomy.   2.       Cytoreduction of multiple jejunal nodules.   3.       Complex  repair of left diaphragm.   4.       Left chest tube placement. 5.       Intraoperative ultrasound. 6.       Lysis of adhesions.                Social History     Socioeconomic History    Marital status:    Tobacco Use    Smoking status: Never    Smokeless tobacco: Never   Vaping Use    Vaping status: Never Used   Substance and Sexual Activity    Alcohol use: Yes     Comment: 1X/MONTH    Drug use: Never   Social History Narrative    , 2 children.  Worked as  at [employer]. 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  Walking for exercise.      Social Drivers of Health     Food Insecurity: No Food Insecurity (3/14/2025)    NCSS - Food Insecurity     Worried About Running Out of Food in the Last Year: No     Ran Out of Food in the Last Year: No   Transportation Needs: No Transportation Needs (3/14/2025)    NCSS - Transportation     Lack of Transportation: No   Housing Stability: Not At Risk (3/14/2025)    NCSS - Housing/Utilities     Has Housing: Yes     Worried About Losing Housing: No     Unable to Get Utilities: No                  Physical Exam     ED Triage Vitals [03/27/25 1612]   /77   Pulse 94   Resp 18   Temp 99.1 °F (37.3 °C)   Temp src Temporal   SpO2 94 %   O2 Device None (Room air)       Current Vitals:   Vital Signs  BP: 117/81  Pulse: 79  Resp: 22  Temp: 98.9 °F (37.2 °C)  Temp src: Temporal  MAP (mmHg): 93    Oxygen Therapy  SpO2: 100 %  O2 Device: None (Room air)        Physical Exam  Physical Exam   Constitutional: Awake, alert, well appearing  Head: Normocephalic and atraumatic.   Eyes: Conjunctivae are normal. Pupils are equal, round, and reactive to light.   Neck: Normal range of motion. No JVD  Cardiovascular: Normal rate, regular rhythm  Pulmonary/Chest: Normal effort.  No accessory muscle use.  No cyanosis.  Abdominal: Soft. slight distended but not tender, scant bowel sounds      Neurological: Pt is alert and oriented to person, place, and time. no cranial nerve  deficits. Speech fluent      ED Course     Labs Reviewed   CBC WITH DIFFERENTIAL WITH PLATELET - Abnormal; Notable for the following components:       Result Value    WBC 13.9 (*)     HGB 11.7 (*)     PLT 1,258.0 (*)     Neutrophil Absolute Prelim 9.79 (*)     Neutrophil Absolute 9.79 (*)     Monocyte Absolute 1.13 (*)     All other components within normal limits   COMP METABOLIC PANEL (14) - Abnormal; Notable for the following components:    Glucose 109 (*)     Alkaline Phosphatase 248 (*)     Bilirubin, Total 0.2 (*)     All other components within normal limits   URINALYSIS, ROUTINE - Abnormal; Notable for the following components:    Clarity Urine Turbid (*)     Spec Gravity >1.030 (*)     Ketones Urine 40 (*)     Blood Urine Trace (*)     Protein Urine 30 (*)     Leukocyte Esterase Urine 25 (*)     WBC Urine 6-10 (*)     RBC Urine 3-5 (*)     Bacteria Urine 1+ (*)     Squamous Epi. Cells Moderate (*)     Ca Oxalate Crystals Moderate (*)     All other components within normal limits   SCAN SLIDE            Blood work notable for notable for thrombocytosis stable over the last 2 or 3 days.  No white count.  UA chemistries fine      CT ABDOMEN+PELVIS(CONTRAST ONLY)(CPT=74177)    Result Date: 3/27/2025  CONCLUSION:  1. Collection in the left nephrectomy bed abutting the pancreas may represent a pseudocyst which appears mildly increased in size from the prior exam.  Abscess cannot be excluded. 2. Small left effusion with left basilar opacity which may represent atelectasis versus infiltrate. 3. Enhancing focus in the right rectus may represent small focus of hemorrhage versus an enhancing lesion.  Clinical correlation is advised.   LOCATION:  [facility]   Dictated by (CST): Dale Weldon MD on 3/27/2025 at 7:06 PM     Finalized by (CST): Dale Weldon MD on 3/27/2025 at 7:19 PM           MDM          Differential diagnoses considered: Complete bowel obstruction versus partial bowel obstruction, less likely  gastritis.      -CT scan without acute findings.  Discussed findings in detail with the patient including incidental findings.  -Offered patient admission for continued IV hydration.  Patient prefers to go home and follow-up as an outpatient with her doctor.    -Message left with surgical PA via [employer] text    I visualized the radiology studies, my independent interpretation: No free air noted on CT scan abdomen pelvis    *Discussion of ongoing management of this patient's care included:  as noted above  *Comorbidities contributing to the complexity of decision making:  think is tumor status post recent complex surgery Dr. Hernandez  *External charts reviewed: Outpatient surgical oncology note from 2 days ago reviewed.  Thrombocytosis is due to recent splenectomy and is expected.  *Additional sources of history: n/a    Shared decision making was done by: patient, myself.          Medical Decision Making      Disposition and Plan     Clinical Impression:  1. Nausea and vomiting in adult    2. Malignant neoplasm metastatic to liver (HCC)         Disposition:  Discharge  3/27/2025  8:02 pm    Follow-up:  Tomás eB MD  [address] [place] [address]  [phone]    Call in 1 day(s)            Medications Prescribed:  Discharge Medication List as of 3/27/2025  8:02 PM        START taking these medications    Details   oxyCODONE HCl 10 MG Oral Tablet Abuse-Deterrent Take 1 tablet by mouth every 6 (six) hours as needed (pain)., Normal, Disp-15 tablet, R-0                 Supplementary Documentation:

## 2025-03-27 NOTE — TELEPHONE ENCOUNTER
Patient called and stated that she is vomiting every time she eats and cannot keep any food down.  Having trouble with liquids as well and can only take small sips of water.  Patient stated that there were black specks in her emesis.  Patient offered to go to ED or we could see her in office tomorrow and order CT scan.  Patient stated she was really uncomfortable and would go to the ED.

## 2025-03-27 NOTE — PROGRESS NOTES
[facility] Surgical Oncology      Patient Name:  Mark Stearns   YOB: 1975   Gender:  Female   Appt Date:  3/26/2025   Provider:  MAXWELL Murcia      PATIENT PROVIDERS  Referring Provider: Jakub Reddy MD    Primary Care Provider:Prachi Arenas MD   Address: [address] [place] [address] Dr Megan DANIELS [id number]   Phone #: [phone]       CHIEF COMPLAINT  Follow-up     PROBLEMS  Reviewed   Patient Active Problem List   Diagnosis    Gastrointestinal stromal tumor (GIST) (HCC)    GERD (gastroesophageal reflux disease)    H/O right nephrectomy    Seasonal allergies    Secondary malignancy of parietal peritoneum (HCC)    Diarrhea    GIST (gastrointestinal stromal tumor), malignant (HCC)    Malignant neoplasm metastatic to liver (HCC)    Primary hypertension    Renal cell carcinoma (HCC)    Metastatic adenocarcinoma (HCC)    Colon cancer screening    GIST, malignant (HCC)    Postprocedural pneumothorax    Nausea and vomiting        History of Present Illness:  Metastatic GIST    Oncology history:  2002 - incidentally found small bowel GIST (age 27) when undergoing a left nephrectomy for RCC; evaluated at [facility] by Dr. Wagner and given 1 yr of adjuvant imatinib followed by serial imaging    7/31/20 - CT w/ subtle recurrent intraperitoneal mass lesion, including 5.6 x 4.0 x 5.1 cm L central mesenteric mass w/ central necrosis c/f recurrent GIST    8/20/20 - resection at [facility] (Kristy) of ~20 peritoneal masses, 1 adherent to sigmoid colon; 4 main tumors (largest ~7 cm, smallest ~2 cm), and 2 cm tumor inside of small bowel mesentery, no tumors ruptured, 16 very tiny tumors (mostly on surface of small intestine); NSG w/ exon 11 KIT [id number]; path w/ GIST, spindle cell type, low grade (mitotic rate 0/5 mm2); peritoneal masses were 6.8 cm and 4.2 cm; mesenteric nodule was 2.8 cm; assorted peritoneal nodules were < 0.7 cm; R pelvic mass was 3.3 cm    10/2/20 - started on imatinib by  Yesica Jorge at [facility]  11/11/20 - genetic counseling, germline testing negative    11/27/20 - CT w/ single residual mass LUQ lesion, slightly decreased; one additional very small possible nodule adjacent to duodenal jejunal junction; mass either in posterior peritoneum or RP in LUQ at caudal margin of spleen, 3.0 x 2.3 cm    12/4/20 - only on brand name Gleevec x1 mo w/ tingling on scalp, periorbital edema, and facial tingling; MR brain negative; switched to generic imatinib w/ significant improvement in sx; takes imatinib w/ Compazine to help nausea    2/22/21 - CT w/ mild decrease of LUQ mass adjacent to distal duodenum, likely residual GIST; no e/o mets; mild dcrease in mass in posterior peritoneum vs RP, 2.8 x 1.6 cm, less conspicuous adjacent nodularity    6/21/21 - CT w/ LUQ mass no significant change; no new lesions; nodularity adjacent to 3rd/4th portion of duodenum similar, 9 x 7 mm    9/24/21 - CT w/ increased nodules from proximal descending colon and 4th portin of duodenum c/w enlarging GIST tumors    11/5/21 - CT w/ increased nodules size abutting 4th portion of duodenum/proximal descending colon; stable 0.8 cm nodule in medial R abdominis rectus muscle c/f tumor; 3.2 x 2.4 cm nodule inferior to spleen/proximal descending colon; 1.8 x 1.3 cm nodule anterior to 3rd/4th portion of duodenum; 0.8 x 0.6 cm nodule in medial aspect of R abdominis muscle    11/20/21 - increased imatinib to 400 mg BID  1/3/22 - slightly increased L-sided abdominal pain  1/25/22 - CT stable  4/29/22 CT AP: stable  8/1/22 CT AP: stable  12/6/22 CT AP  No significant change in heterogeneous mass in the left retroperitoneum and small soft  tissue nodule in the right rectus abdominis muscle. No CT evidence of new metastatic disease.  4/17/23 CT AP: stable exam  12/5/23 CT AP stable exam  4/23/24 CT AP  Increase in size of mass in the left renal fossa   Guardant KIT [id number] del, exon 11  6/3/24 day 1 cycle 1 sunatenib   11/13/2025:  Interval decrease in size of the left perisplenic/renal fossa mass and lesion within the right rectus abdominis muscle.   2/11/2025: Continued decrease in size  3/14/2025: Resection recurrent GIST tumor with en-bloc partial resection of left diaphragm, partial pancreatectomy, and splenectomy. Cytoreduction multiple jejunal nodules. Left chest tube placement with complex repair left diaphragm. Intraoperative ultrasound. Lysis of adhesions. --> Pathology GIST tumor, lymph nodes negative.     Pt seen while getting IVF. Has been tolerating some yogurts, saline crackers wihtout vomiting and tolerating water and gatorade. Pain improving, no further vomiting. No Fevers/chills/CP  or Sob. Went to see PCP who noticed a small lump to R of incision. Pt denies any severe tenderness drainage, or redness to the area.      Vital Signs:      3/24/2025    12:20 PM 3/26/2025     9:10 AM   Vitals History   /74 122/77   BP Location  Right arm   Patient Position Standing Sitting   Cuff Size adult adult   Pulse 115 86   Resp  16   SpO2 94 % 92 %   Weight  161 lbs   Height  1.676 m (5' 5.98\")   BMI  26 kg/m2         Medications Reviewed:    Current Outpatient Medications:     metoclopramide 10 MG Oral Tab, Take 1 tablet (10 mg total) by mouth 3 (three) times daily as needed., Disp: 20 tablet, Rfl: 0    oxyCODONE 10 MG Oral Tab, Take 1 tablet (10 mg total) by mouth every 4 (four) hours as needed for Pain., Disp: 30 tablet, Rfl: 0    docusate sodium 100 MG Oral Cap, Take 100 mg by mouth 2 (two) times daily., Disp: 60 capsule, Rfl: 0    Omeprazole 40 MG Oral Capsule Delayed Release, Take 1 capsule (40 mg total) by mouth daily., Disp: , Rfl:     oxyCODONE 5 MG Oral Tab, Take 1 tablet (5 mg total) by mouth every 6 (six) hours as needed., Disp: 15 tablet, Rfl: 0    enoxaparin 40 MG/0.4ML Injection Solution Prefilled Syringe, Inject 0.4 mL (40 mg total) into the skin daily., Disp: 24 each, Rfl: 0    ondansetron (ZOFRAN) 4 mg tablet, Take 1  tablet (4 mg total) by mouth every 6 (six) hours as needed., Disp: 12 tablet, Rfl: 0    Naloxone HCl 4 MG/0.1ML Nasal Liquid, 4 mg by Nasal route as needed. If patient remains unresponsive, repeat dose in other nostril 2-5 minutes after first dose., Disp: 1 kit, Rfl: 0    amLODIPine 5 MG Oral Tab, Take 1 tablet (5 mg total) by mouth daily., Disp: , Rfl:      Allergies Reviewed:  Allergies[1]     History:  Reviewed:  Past Medical History:    Abdominal hernia    Abdominal pain    Arthritis    Bloating    Decorative tattoo    Esophageal reflux    Fatigue    Flatulence/gas pain/belching    Gastrointestinal stromal tumor (GIST) (HCC)    followed at [facility], on chemo    Headache disorder    Heartburn    High blood pressure    Hx of motion sickness    IBS (irritable bowel syndrome)    Indigestion    Leaking of urine    Pain with bowel movements    Personal history of antineoplastic chemotherapy    LAST ORAL TREATMENT    PONV (postoperative nausea and vomiting)    Renal cell carcinoma, left (HCC)    Renal disorder    RIGHT KIDNEY ONLY, LEFT KIDNEY REMOVED 2003    Stool incontinence    Stress    Uncomfortable fullness after meals    Visual impairment    GLASSES    Wears glasses    Weight loss      Reviewed:  Past Surgical History:   Procedure Laterality Date    Bowel resection  08/20/2020    25 tumors resected from small bowel    Colonoscopy N/A 03/11/2025    Procedure: COLONOSCOPY with biopsies;  Surgeon: Keyshawn Marshall MD;  Location:  ENDOSCOPY    Nephrectomy Left 11/2002    partial small bowel resection    Other surgical history  03/14/2025    Resection of recurrent/metastatic gastrointestinal stromal tumor with en bloc partial resection of left diaphragm, partial distal pancreatectomy, and splenectomy.   2.       Cytoreduction of multiple jejunal nodules.   3.       Complex repair of left diaphragm.   4.       Left chest tube placement. 5.       Intraoperative ultrasound. 6.       Lysis of adhesions.      Reviewed  Social History:  Social History     Socioeconomic History    Marital status:    Tobacco Use    Smoking status: Never    Smokeless tobacco: Never   Vaping Use    Vaping status: Never Used   Substance and Sexual Activity    Alcohol use: Yes     Comment: 1X/MONTH    Drug use: Never   Social History Narrative    , 2 children.  Worked as  at [employer]. 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  Walking for exercise.      Social Drivers of Health     Food Insecurity: No Food Insecurity (3/14/2025)    NCSS - Food Insecurity     Worried About Running Out of Food in the Last Year: No     Ran Out of Food in the Last Year: No   Transportation Needs: No Transportation Needs (3/14/2025)    NCSS - Transportation     Lack of Transportation: No   Housing Stability: Not At Risk (3/14/2025)    NCSS - Housing/Utilities     Has Housing: Yes     Worried About Losing Housing: No     Unable to Get Utilities: No      Reviewed:  Family History   Problem Relation Age of Onset    Diabetes Mother     Thyroid Disorder Sister     Cancer Son 1        Wilms      Review of Systems:  Doing well post-operatively  Denies fever or chills  Denies chest pain or SOB  Nausea and vomiting resolved.  Denies dysuria or hematuria  Denies warmth, erythema, or drainage at incision       Physical Examination:  Constitutional:  NAD.   Eyes: non-icteric.    Lungs: CTAB. CT site redressed w/ vaseline dressing, healing well w/o erythema or drainage.   Cardiovascular: Heart Auscultation: RRR.   Abdomen: Soft, non-tender, non-distended. Incision healing well without drainage or erythema. Staples in place.   Musculoskeletal: no edema.     Document Review:  CXR 3/21/2025:  CONCLUSION:    1. 13 mm left apical pneumothorax, previously 8 mm.  2. Bibasilar atelectasis and small left pleural effusion.     Procedure(s):  None      Assessment / Plan:  Metastatic GIST  - Pathology report and letter sent to Dr. Cardona's office   - Nausea and vomiting  resolving, electrolytes and vomiting stable. S/P IVF today. Pt now tolerating PO intake.   - Discussed call back if sx worsen.  - Staples removed, wound care reviewed.    Post-Procedural Pneumothorax  - Pt without CP, SOB, PTX resolving on CXR.     Thrombocytosis  - expected course post-splenectomy, expect eventual downtrend, discussed w/ pt need for F/U w/ PCP and med/onc.           - Reviewed return precautions, when to call the office, and signs and symptoms to report to the ER in depth with patient.             Follow Up:  Dr. Barry Be 3 months       Electronically Signed by: MAXWELL Murcia             [1]   Allergies  Allergen Reactions    Codeine ITCHING, SHORTNESS OF BREATH, SWELLING and TONGUE SWELLING    Opioid Analgesics ITCHING, OTHER (SEE COMMENTS) and SHORTNESS OF BREATH    Penicillins RASH, SHORTNESS OF BREATH, SWELLING and TONGUE SWELLING

## 2025-03-28 ENCOUNTER — OFFICE VISIT (OUTPATIENT)
Dept: SURGERY | Facility: CLINIC | Age: 50
End: 2025-03-28
Payer: COMMERCIAL

## 2025-03-28 VITALS
DIASTOLIC BLOOD PRESSURE: 85 MMHG | SYSTOLIC BLOOD PRESSURE: 121 MMHG | OXYGEN SATURATION: 98 % | TEMPERATURE: 98 F | HEART RATE: 78 BPM

## 2025-03-28 DIAGNOSIS — K86.3 PANCREATIC PSEUDOCYST (HCC): ICD-10-CM

## 2025-03-28 DIAGNOSIS — C49.A0 GASTROINTESTINAL STROMAL TUMOR (GIST) (HCC): Primary | ICD-10-CM

## 2025-03-28 PROCEDURE — 3079F DIAST BP 80-89 MM HG: CPT

## 2025-03-28 PROCEDURE — 99024 POSTOP FOLLOW-UP VISIT: CPT

## 2025-03-28 PROCEDURE — 3074F SYST BP LT 130 MM HG: CPT

## 2025-03-28 RX ORDER — LEVOFLOXACIN 750 MG/1
750 TABLET, FILM COATED ORAL DAILY
Qty: 7 TABLET | Refills: 0 | Status: SHIPPED | OUTPATIENT
Start: 2025-03-28 | End: 2025-04-04

## 2025-03-28 RX ORDER — METRONIDAZOLE 500 MG/1
500 TABLET ORAL 3 TIMES DAILY
Qty: 21 TABLET | Refills: 0 | Status: SHIPPED | OUTPATIENT
Start: 2025-03-28 | End: 2025-04-04

## 2025-03-28 NOTE — PROGRESS NOTES
[facility] Surgical Oncology      Patient Name:  Mark Stearns   YOB: 1975   Gender:  Female   Appt Date:  3/28/2025   Provider:  MAXWELL Rose     PATIENT PROVIDERS  Referring Provider: Jakub Reddy MD    Primary Care Provider:Prachi Arenas MD   Address: [address] [place] [address] [address] Megan [place]S [id number]   Phone #: [phone]       CHIEF COMPLAINT  Chief Complaint   Patient presents with    Post-Op        PROBLEMS  Reviewed   Patient Active Problem List   Diagnosis    Gastrointestinal stromal tumor (GIST) (HCC)    GERD (gastroesophageal reflux disease)    H/O right nephrectomy    Seasonal allergies    Secondary malignancy of parietal peritoneum (HCC)    Diarrhea    GIST (gastrointestinal stromal tumor), malignant (HCC)    Malignant neoplasm metastatic to liver (HCC)    Primary hypertension    Renal cell carcinoma (HCC)    Metastatic adenocarcinoma (HCC)    Colon cancer screening    GIST, malignant (HCC)    Postprocedural pneumothorax    Nausea and vomiting    Pancreatic pseudocyst (HCC)        History of Present Illness:  Metastatic GIST     Oncology history:  2002 - incidentally found small bowel GIST (age 27) when undergoing a left nephrectomy for RCC; evaluated at [facility] by Dr. Wagner and given 1 yr of adjuvant imatinib followed by serial imaging    7/31/20 - CT w/ subtle recurrent intraperitoneal mass lesion, including 5.6 x 4.0 x 5.1 cm L central mesenteric mass w/ central necrosis c/f recurrent GIST    8/20/20 - resection at [facility] ([place]) of ~20 peritoneal masses, 1 adherent to sigmoid colon; 4 main tumors (largest ~7 cm, smallest ~2 cm), and 2 cm tumor inside of small bowel mesentery, no tumors ruptured, 16 very tiny tumors (mostly on surface of small intestine); NSG w/ exon 11 KIT [id number]; path w/ GIST, spindle cell type, low grade (mitotic rate 0/5 mm2); peritoneal masses were 6.8 cm and 4.2 cm; mesenteric nodule was 2.8 cm; assorted peritoneal nodules  were < 0.7 cm; R pelvic mass was 3.3 cm    10/2/20 - started on imatinib by Yesica Jorge at [facility]  11/11/20 - genetic counseling, germline testing negative    11/27/20 - CT w/ single residual mass LUQ lesion, slightly decreased; one additional very small possible nodule adjacent to duodenal jejunal junction; mass either in posterior peritoneum or RP in LUQ at caudal margin of spleen, 3.0 x 2.3 cm    12/4/20 - only on brand name Gleevec x1 mo w/ tingling on scalp, periorbital edema, and facial tingling; MR brain negative; switched to generic imatinib w/ significant improvement in sx; takes imatinib w/ Compazine to help nausea    2/22/21 - CT w/ mild decrease of LUQ mass adjacent to distal duodenum, likely residual GIST; no e/o mets; mild dcrease in mass in posterior peritoneum vs RP, 2.8 x 1.6 cm, less conspicuous adjacent nodularity    6/21/21 - CT w/ LUQ mass no significant change; no new lesions; nodularity adjacent to 3rd/4th portion of duodenum similar, 9 x 7 mm    9/24/21 - CT w/ increased nodules from proximal descending colon and 4th portin of duodenum c/w enlarging GIST tumors    11/5/21 - CT w/ increased nodules size abutting 4th portion of duodenum/proximal descending colon; stable 0.8 cm nodule in medial R abdominis rectus muscle c/f tumor; 3.2 x 2.4 cm nodule inferior to spleen/proximal descending colon; 1.8 x 1.3 cm nodule anterior to 3rd/4th portion of duodenum; 0.8 x 0.6 cm nodule in medial aspect of R abdominis muscle    11/20/21 - increased imatinib to 400 mg BID  1/3/22 - slightly increased L-sided abdominal pain  1/25/22 - CT stable  4/29/22 CT AP: stable  8/1/22 CT AP: stable  12/6/22 CT AP  No significant change in heterogeneous mass in the left retroperitoneum and small soft  tissue nodule in the right rectus abdominis muscle. No CT evidence of new metastatic disease.  4/17/23 CT AP: stable exam  12/5/23 CT AP stable exam  4/23/24 CT AP  Increase in size of mass in the left renal fossa    Guardant KIT [id number], exon 11  6/3/24 day 1 cycle 1 sunatenib   11/13/2025: Interval decrease in size of the left perisplenic/renal fossa mass and lesion within the right rectus abdominis muscle.   2/11/2025: Continued decrease in size  3/14/2025: Resection recurrent GIST tumor with en-bloc partial resection of left diaphragm, partial pancreatectomy, and splenectomy. Cytoreduction multiple jejunal nodules. Left chest tube placement with complex repair left diaphragm. Intraoperative ultrasound. Lysis of adhesions. --> Pathology GIST tumor, lymph nodes negative.   Reported to ER last night for nausea and vomiting. Noted vomiting had black specks in it. She is taking reglan and zofran routinely. Her breathing has been good. She denies hematuria, dysuria but notes increased frequency. She is trying to take oxy only prior to bedtime and tylenol during the day.      Vital Signs:  /85 (BP Location: Right arm, Patient Position: Sitting)   Pulse 78   Temp 98.3 °F (36.8 °C)   LMP 03/01/2025 (Exact Date)   SpO2 98%      Medications Reviewed:    Current Outpatient Medications:     oxyCODONE HCl 10 MG Oral Tablet Abuse-Deterrent, Take 1 tablet by mouth every 6 (six) hours as needed (pain)., Disp: 15 tablet, Rfl: 0    metoclopramide 10 MG Oral Tab, Take 1 tablet (10 mg total) by mouth 3 (three) times daily as needed., Disp: 20 tablet, Rfl: 0    oxyCODONE 10 MG Oral Tab, Take 1 tablet (10 mg total) by mouth every 4 (four) hours as needed for Pain., Disp: 30 tablet, Rfl: 0    docusate sodium 100 MG Oral Cap, Take 100 mg by mouth 2 (two) times daily., Disp: 60 capsule, Rfl: 0    Omeprazole 40 MG Oral Capsule Delayed Release, Take 1 capsule (40 mg total) by mouth daily., Disp: , Rfl:     oxyCODONE 5 MG Oral Tab, Take 1 tablet (5 mg total) by mouth every 6 (six) hours as needed., Disp: 15 tablet, Rfl: 0    enoxaparin 40 MG/0.4ML Injection Solution Prefilled Syringe, Inject 0.4 mL (40 mg total) into the skin daily.,  Disp: 24 each, Rfl: 0    ondansetron (ZOFRAN) 4 mg tablet, Take 1 tablet (4 mg total) by mouth every 6 (six) hours as needed., Disp: 12 tablet, Rfl: 0    Naloxone HCl 4 MG/0.1ML Nasal Liquid, 4 mg by Nasal route as needed. If patient remains unresponsive, repeat dose in other nostril 2-5 minutes after first dose., Disp: 1 kit, Rfl: 0    amLODIPine 5 MG Oral Tab, Take 1 tablet (5 mg total) by mouth daily., Disp: , Rfl:      Allergies Reviewed:  Allergies[1]     History:  Reviewed:  Past Medical History:    Abdominal hernia    Abdominal pain    Arthritis    Bloating    Decorative tattoo    Esophageal reflux    Fatigue    Flatulence/gas pain/belching    Gastrointestinal stromal tumor (GIST) (HCC)    followed at [facility], on chemo    Headache disorder    Heartburn    High blood pressure    Hx of motion sickness    IBS (irritable bowel syndrome)    Indigestion    Leaking of urine    Pain with bowel movements    Personal history of antineoplastic chemotherapy    LAST ORAL TREATMENT    PONV (postoperative nausea and vomiting)    Renal cell carcinoma, left (HCC)    Renal disorder    RIGHT KIDNEY ONLY, LEFT KIDNEY REMOVED 2003    Stool incontinence    Stress    Uncomfortable fullness after meals    Visual impairment    GLASSES    Wears glasses    Weight loss      Reviewed:  Past Surgical History:   Procedure Laterality Date    Bowel resection  08/20/2020    25 tumors resected from small bowel    Colonoscopy N/A 03/11/2025    Procedure: COLONOSCOPY with biopsies;  Surgeon: Keyshawn Marshall MD;  Location:  ENDOSCOPY    Nephrectomy Left 11/2002    partial small bowel resection    Other surgical history  03/14/2025    Resection of recurrent/metastatic gastrointestinal stromal tumor with en bloc partial resection of left diaphragm, partial distal pancreatectomy, and splenectomy.   2.       Cytoreduction of multiple jejunal nodules.   3.       Complex repair of left diaphragm.   4.       Left chest tube placement. 5.        Intraoperative ultrasound. 6.       Lysis of adhesions.      Reviewed Social History:  Social History     Socioeconomic History    Marital status:    Tobacco Use    Smoking status: Never    Smokeless tobacco: Never   Vaping Use    Vaping status: Never Used   Substance and Sexual Activity    Alcohol use: Yes     Comment: 1X/MONTH    Drug use: Never   Social History Narrative    , 2 children.  Worked as  at [employer]. 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  Walking for exercise.      Social Drivers of Health     Food Insecurity: No Food Insecurity (3/14/2025)    NCSS - Food Insecurity     Worried About Running Out of Food in the Last Year: No     Ran Out of Food in the Last Year: No   Transportation Needs: No Transportation Needs (3/14/2025)    NCSS - Transportation     Lack of Transportation: No   Housing Stability: Not At Risk (3/14/2025)    NCSS - Housing/Utilities     Has Housing: Yes     Worried About Losing Housing: No     Unable to Get Utilities: No      Reviewed:  Family History   Problem Relation Age of Onset    Diabetes Mother     Thyroid Disorder Sister     Cancer Son 1        Wilms      Review of Systems:  Doing well post-operatively  Denies fever or chills  Denies chest pain or SOB  Denies abdominal pain. + N/V  Denies dysuria or hematuria  Denies warmth, erythema, or drainage at incision       Physical Examination:  Constitutional:  NAD.   Eyes: non-icteric.    Abdomen: Soft, non-tender, non-distended. Incision healing well without drainage or erythema.  Musculoskeletal: no edema.     Document Review:  CT A/P  FINDINGS:    LIVER:  No enlargement, atrophy, abnormal density, or significant focal lesion.    BILIARY:  No visible dilatation or calcification.    PANCREAS:  A collection along the tail of the pancreas is again noted measuring up to 6.0 x 3.5 cm (image 32).  The body and head of the pancreas are mildly atrophic and otherwise unremarkable.  SPLEEN:  Surgically  absent  KIDNEYS:  Status post left nephrectomy.  ADRENALS:  No mass or enlargement.    AORTA/VASCULAR:  No aneurysm or dissection.    RETROPERITONEUM:  No mass or adenopathy.    BOWEL/MESENTERY:  Postsurgical changes are noted in the jejunum.  Stable dilatation of the distal duodenum and proximal jejunum is noted.  No evidence of bowel obstruction.  Small amount of free fluid in the pelvis.  Subtle focus of enhancement abutting   a loop of ileum in the anterior abdomen measures up to 0.7 x 0.4 cm (image 81).  ABDOMINAL WALL:  New hyperdense focus in the right rectus measures up to 1.2 x 0.7 cm (image 73) which may represent an enhancing lesion versus small focus of hemorrhage.  Subcutaneous emphysema in the left anterior abdominal wall is likely iatrogenic.  URINARY BLADDER:  No visible focal wall thickening, lesion, or calculus.    PELVIC NODES:  No adenopathy.    PELVIC ORGANS:  No visible mass.  Pelvic organs appropriate for patient age.    BONES:  No bony lesion or fracture.    LUNG BASES:  Small left effusion with left basilar atelectatic change.  OTHER:  Negative.       CONCLUSION:    1. Collection in the left nephrectomy bed abutting the pancreas may represent a pseudocyst which appears mildly increased in size from the prior exam.  Abscess cannot be excluded.  2. Small left effusion with left basilar opacity which may represent atelectasis versus infiltrate.  3. Enhancing focus in the right rectus may represent small focus of hemorrhage versus an enhancing lesion.  Clinical correlation is advised.     Procedure(s):  None     Assessment / Plan:  GIST  Pancreatic Pseudocyst  - No acute surgical oncology issues  - Incision site healing well without erythema or drainage  - CT from ER significant for pancreatic pseudocyst. Does not appear infected. Does approach left diaphragm, but no signs of fistula or connection  - Will start levaquin flagyl  - Continue reglan for GI motility and zofran for nausea  - Continue  soft diet, small frequent meals throughout the day  - Increase PPI to BID per regimen patient was taking prior to surgery  - Reviewed signs and symptoms to call the office or return to clinic sooner      Follow Up:  Call Monday FU Next week       Electronically Signed by: MAXWELL Rose           [1]   Allergies  Allergen Reactions    Codeine ITCHING, SHORTNESS OF BREATH, SWELLING and TONGUE SWELLING    Opioid Analgesics ITCHING, OTHER (SEE COMMENTS) and SHORTNESS OF BREATH    Penicillins RASH, SHORTNESS OF BREATH, SWELLING and TONGUE SWELLING

## 2025-03-28 NOTE — ED QUICK NOTES
Report from Manjinder CHOW RN. Pt resting, states pain 2-3/10, \"just hungry.\" Awaiting CT results at this time. Pt denies need, call light in reach

## 2025-03-31 ENCOUNTER — TELEPHONE (OUTPATIENT)
Age: 50
End: 2025-03-31

## 2025-04-01 ENCOUNTER — OFFICE VISIT (OUTPATIENT)
Dept: PHYSICAL THERAPY | Facility: HOSPITAL | Age: 50
End: 2025-04-01
Attending: SURGERY
Payer: COMMERCIAL

## 2025-04-01 ENCOUNTER — TELEPHONE (OUTPATIENT)
Dept: PHYSICAL THERAPY | Facility: HOSPITAL | Age: 50
End: 2025-04-01

## 2025-04-01 DIAGNOSIS — J95.811 POSTPROCEDURAL PNEUMOTHORAX: Primary | ICD-10-CM

## 2025-04-01 PROCEDURE — 97110 THERAPEUTIC EXERCISES: CPT

## 2025-04-01 NOTE — PROGRESS NOTES
Patient: Mark Stearns (50 year old, female) Referring Provider:  Insurance:   Diagnosis: Decreased functional mobility and endurance (Z74.09) [place]  [place]   Date of Surgery: 3/14/25 Next MD visit:  N/A   Precautions:  Cancer none currently scheduled Referral Information:    Date of Evaluation: Req: 0, Auth: 0, Exp:     03/24/25 POC Auth Visits:  16       Today's Date   4/1/2025    Subjective  Patient reports she has been feeling better this week.  States she has been able to keep food down more the past few days.  States she was able to do more walking and she states she feels good with movement. Is walking within the home without the walker but is using the walker for longer distances because otherwise she feels she starts to hunch over more. States she is still getting some pain in the right upper abdomen/lower chest, especially if having to take a deep breathe (had recent pneumothorax).       Pain: 2/10     Objective  Treatment per table           Assessment  Tx today focusing on trial of initial HEP to ensure pt tolerance to these activities.  Patient does note lower abdominal recruitment with most of these activities today but work level was such that there was no pain.  Additional discussion regarding progression of walking with patient encouraged to start performing walks outside of the home without the walker but keeping the distances shorter to start and then slowly lengthening the walk time/distance as tolerated.    Goals (to be met in 16 visits)   Patient demonstrating ability to perform sit<>supine transfer independently using log roll technique for greater independence with transitional movements  Patient demonstrating ability to perform sit<>stand transfer independently without UE support for greater functional strength and independence with ADL  Patient demonstrating hip/LE strength 5/5 to promote greater ease with ADL such as prolonged walking and transitional movements  Patient  demonstrating normalized, independent gait with increased endurance, tolerating >20 minutes of walking   Patient demonstrating improved core strength >=2/5 for Sahrmann low ab testing with ability to perform proper abdominal bracing without requiring verbal/tactile cuing for improved core stabilization with ADL   Patient demonstrating improved functional strength, reporting ability to perform ADL such as loading/unloading the  and light lifting activities without pain or difficutly.         Plan  Continue per plan of care.    Treatment Last 4 Visits  Treatment Day: 2       3/24/2025 4/1/2025   LE Treatment   Therapeutic Exercise  Seated scap retrxn w/ B ER 2x10 RTB  GTB seated scap retrxn w/ row 2x10  Seated glute set x12, 3 sec hold  Seated LAQ w/ 3# ankle weight 2x10 B  Trial sdly clam 2x10 - additional edu in modification for seated clam w/ tband if needed  Discussion regarding transitioning to walking w/o walker (encouraged shorter distances to start and slowly progress as tolerated for community distances)   Therapeutic Activity Pt edu in and rehearsal of proper mechanics for sit<>supine performing log roll technique to minimize strain on abdominal musculature    Additional Treatment Screening of UE strength and mobility: pt noting mild pain/pull in upper incision with AROM flexion and abduction.  MMT flxn and abd limited to 4/5 due to presence of abdominal pain with resisted testing.  ER/IR=5/5 B.    Therapeutic Exercise Minutes  45   Therapeutic Activity Minutes 10    Total Time Of Timed Procedures 10 45   Total Time Of Service-Based Procedures 0 0   Total Treatment Time 10 45   HEP  Seated scap retrxn w/ B ER 2x10 RTB  GTB seated scap retrxn w/ row 2x10  Seated glute set 3x10, 3 sec hold  Seated LAQ w/ 3# ankle weight 2x10 B  Sdly clam 2x10 (w/ RTB - can modify to sitting if needed)        HEP  Seated scap retrxn w/ B ER 2x10 RTB  GTB seated scap retrxn w/ row 2x10  Seated glute set 3x10, 3 sec  hold  Seated LAQ w/ 3# ankle weight 2x10 B  Sdly clam 2x10 (w/ RTB - can modify to sitting if needed)    Charges     3 NIKKI

## 2025-04-02 ENCOUNTER — HOSPITAL ENCOUNTER (OUTPATIENT)
Dept: GENERAL RADIOLOGY | Facility: HOSPITAL | Age: 50
Discharge: HOME | End: 2025-04-02
Payer: COMMERCIAL

## 2025-04-02 DIAGNOSIS — J95.811 POSTPROCEDURAL PNEUMOTHORAX: ICD-10-CM

## 2025-04-02 PROCEDURE — 71046 X-RAY EXAM CHEST 2 VIEWS: CPT

## 2025-04-03 ENCOUNTER — OFFICE VISIT (OUTPATIENT)
Dept: PHYSICAL THERAPY | Facility: HOSPITAL | Age: 50
End: 2025-04-03
Attending: SURGERY
Payer: COMMERCIAL

## 2025-04-03 ENCOUNTER — OFFICE VISIT (OUTPATIENT)
Dept: SURGERY | Facility: CLINIC | Age: 50
End: 2025-04-03
Payer: COMMERCIAL

## 2025-04-03 VITALS
WEIGHT: 161 LBS | BODY MASS INDEX: 26 KG/M2 | OXYGEN SATURATION: 96 % | DIASTOLIC BLOOD PRESSURE: 77 MMHG | RESPIRATION RATE: 16 BRPM | SYSTOLIC BLOOD PRESSURE: 128 MMHG | HEART RATE: 76 BPM | TEMPERATURE: 98 F

## 2025-04-03 DIAGNOSIS — C49.A0 GASTROINTESTINAL STROMAL TUMOR (GIST) (HCC): Primary | ICD-10-CM

## 2025-04-03 LAB
ALBUMIN SERPL-MCNC: 4.2 G/DL (ref 3.2–4.8)
ALBUMIN/GLOB SERPL: 1.4 {RATIO} (ref 1–2)
ALP LIVER SERPL-CCNC: 128 U/L
ALT SERPL-CCNC: 20 U/L
ANION GAP SERPL CALC-SCNC: 9 MMOL/L (ref 0–18)
AST SERPL-CCNC: 24 U/L (ref ?–34)
BASOPHILS # BLD AUTO: 0.06 X10(3) UL (ref 0–0.2)
BASOPHILS NFR BLD AUTO: 0.6 %
BILIRUB SERPL-MCNC: 0.3 MG/DL (ref 0.3–1.2)
BUN BLD-MCNC: 10 MG/DL (ref 9–23)
CALCIUM BLD-MCNC: 9.8 MG/DL (ref 8.7–10.6)
CHLORIDE SERPL-SCNC: 104 MMOL/L (ref 98–112)
CO2 SERPL-SCNC: 26 MMOL/L (ref 21–32)
CREAT BLD-MCNC: 0.74 MG/DL
EGFRCR SERPLBLD CKD-EPI 2021: 100 ML/MIN/1.73M2 (ref 60–?)
EOSINOPHIL # BLD AUTO: 0.28 X10(3) UL (ref 0–0.7)
EOSINOPHIL NFR BLD AUTO: 3 %
ERYTHROCYTE [DISTWIDTH] IN BLOOD BY AUTOMATED COUNT: 13.7 %
FASTING STATUS PATIENT QL REPORTED: NO
GLOBULIN PLAS-MCNC: 3 G/DL (ref 2–3.5)
GLUCOSE BLD-MCNC: 105 MG/DL (ref 70–99)
HCT VFR BLD AUTO: 34.8 %
HGB BLD-MCNC: 11.2 G/DL
IMM GRANULOCYTES # BLD AUTO: 0.04 X10(3) UL (ref 0–1)
IMM GRANULOCYTES NFR BLD: 0.4 %
LYMPHOCYTES # BLD AUTO: 2.62 X10(3) UL (ref 1–4)
LYMPHOCYTES NFR BLD AUTO: 28 %
MCH RBC QN AUTO: 30.3 PG (ref 26–34)
MCHC RBC AUTO-ENTMCNC: 32.2 G/DL (ref 31–37)
MCV RBC AUTO: 94.1 FL
MONOCYTES # BLD AUTO: 0.92 X10(3) UL (ref 0.1–1)
MONOCYTES NFR BLD AUTO: 9.8 %
NEUTROPHILS # BLD AUTO: 5.43 X10 (3) UL (ref 1.5–7.7)
NEUTROPHILS # BLD AUTO: 5.43 X10(3) UL (ref 1.5–7.7)
NEUTROPHILS NFR BLD AUTO: 58.2 %
OSMOLALITY SERPL CALC.SUM OF ELEC: 287 MOSM/KG (ref 275–295)
PLATELET # BLD AUTO: 931 10(3)UL (ref 150–450)
POTASSIUM SERPL-SCNC: 4.4 MMOL/L (ref 3.5–5.1)
PROT SERPL-MCNC: 7.2 G/DL (ref 5.7–8.2)
RBC # BLD AUTO: 3.7 X10(6)UL
SODIUM SERPL-SCNC: 139 MMOL/L (ref 136–145)
WBC # BLD AUTO: 9.4 X10(3) UL (ref 4–11)

## 2025-04-03 PROCEDURE — 97110 THERAPEUTIC EXERCISES: CPT

## 2025-04-03 PROCEDURE — 3074F SYST BP LT 130 MM HG: CPT

## 2025-04-03 PROCEDURE — 80053 COMPREHEN METABOLIC PANEL: CPT

## 2025-04-03 PROCEDURE — 85025 COMPLETE CBC W/AUTO DIFF WBC: CPT

## 2025-04-03 PROCEDURE — 3078F DIAST BP <80 MM HG: CPT

## 2025-04-03 PROCEDURE — 99024 POSTOP FOLLOW-UP VISIT: CPT

## 2025-04-03 NOTE — PROGRESS NOTES
Patient: Mark Stearns (50 year old, female) Referring Provider:  Insurance:   Diagnosis: Decreased functional mobility and endurance (Z74.09) [place]  [place]   Date of Surgery: 3/14/25 Next MD visit:  N/A   Precautions:  Cancer none currently scheduled Referral Information:    Date of Evaluation: Req: 0, Auth: 0, Exp:     03/24/25 POC Auth Visits:  16       Today's Date   4/3/2025    Subjective  Reports some tiredenss after last therapy session but was able to do her other activites the rest of the day without trouble. She is feeling so much better today, feels like she turned a corner. Breathing feels a little better too. Has been keeping food down now for the past day and a half. Feels like she is moving a lot better today, she did not use the walker at all yesterday. Walked around the block once in the morning and twice in the evening feeling a little short of breath but otherwise good, totaling 6,500 steps yesterday. Had difficulty with the sidelying exercise at home, switched to sitting upright instead without difficulty..       Pain: 2/10     Objective  Treatment per table            Assessment  Pt reporting improved energy and endurance today, trialed small progression of exercises to gauge tolerance. Reports appropriate fatigue at end of session todady. REports lower abdominal recruitment with exercises but no increase in pain. HEP reviewed, demonstrates good understanding. Discussed slow progression of walking distance as tolerated.    Goals (to be met in 16 visits)   Patient demonstrating ability to perform sit<>supine transfer independently using log roll technique for greater independence with transitional movements  Patient demonstrating ability to perform sit<>stand transfer independently without UE support for greater functional strength and independence with ADL  Patient demonstrating hip/LE strength 5/5 to promote greater ease with ADL such as prolonged walking and transitional  movements  Patient demonstrating normalized, independent gait with increased endurance, tolerating >20 minutes of walking   Patient demonstrating improved core strength >=2/5 for Sahrmann low ab testing with ability to perform proper abdominal bracing without requiring verbal/tactile cuing for improved core stabilization with ADL   Patient demonstrating improved functional strength, reporting ability to perform ADL such as loading/unloading the  and light lifting activities without pain or difficutly.     Plan  Continue per plan of care.    Treatment Last 4 Visits  Treatment Day: 3       3/24/2025 4/1/2025 4/3/2025   LE Treatment   Therapeutic Exercise  Seated scap retrxn w/ B ER 2x10 RTB  GTB seated scap retrxn w/ row 2x10  Seated glute set x12, 3 sec hold  Seated LAQ w/ 3# ankle weight 2x10 B  Trial sdly clam 2x10 - additional edu in modification for seated clam w/ tband if needed  Discussion regarding transitioning to walking w/o walker (encouraged shorter distances to start and slowly progress as tolerated for community distances) RTB seated horizontal abd 2 x 10 reps   RTB ER 2 x 10 reps   Bicep curls 2 # x 10   Seated glute set 2 x 10, 3 sec hold   LAQ 3 # 2 x 10 R/L   Seated hip adduction 2 x 10, 3 sec hold   Mini squats 2 x 10 reps   Side steps in // bars x 2 laps   Forward/backward monster walks in // bars x 2 laps    Therapeutic Activity Pt edu in and rehearsal of proper mechanics for sit<>supine performing log roll technique to minimize strain on abdominal musculature     Additional Treatment Screening of UE strength and mobility: pt noting mild pain/pull in upper incision with AROM flexion and abduction.  MMT flxn and abd limited to 4/5 due to presence of abdominal pain with resisted testing.  ER/IR=5/5 B.     Therapeutic Exercise Minutes  45 40   Therapeutic Activity Minutes 10     Total Time Of Timed Procedures 10 45 40   Total Time Of Service-Based Procedures 0 0 0   Total Treatment Time 10  45 40   HEP  Seated scap retrxn w/ B ER 2x10 RTB  GTB seated scap retrxn w/ row 2x10  Seated glute set 3x10, 3 sec hold  Seated LAQ w/ 3# ankle weight 2x10 B  Sdly clam 2x10 (w/ RTB - can modify to sitting if needed)         HEP  Seated scap retrxn w/ B ER 2x10 RTB  GTB seated scap retrxn w/ row 2x10  Seated glute set 3x10, 3 sec hold  Seated LAQ w/ 3# ankle weight 2x10 B  Sdly clam 2x10 (w/ RTB - can modify to sitting if needed)    Charges     3 TE

## 2025-04-03 NOTE — PROGRESS NOTES
[facility] Surgical Oncology      Patient Name:  Mark Stearns   YOB: 1975   Gender:  Female   Appt Date:  4/3/2025   Provider:  MAXWELL Rose     PATIENT PROVIDERS  Referring Provider: Jakub Reddy MD    Primary Care Provider:Prachi Aernas MD   Address: [address] [place] [address] [address] Megan DANIELS [id number]   Phone #: [phone]       CHIEF COMPLAINT  Chief Complaint   Patient presents with    Post-Op        PROBLEMS  Reviewed   Patient Active Problem List   Diagnosis    Gastrointestinal stromal tumor (GIST) (HCC)    GERD (gastroesophageal reflux disease)    H/O right nephrectomy    Seasonal allergies    Secondary malignancy of parietal peritoneum (HCC)    Diarrhea    GIST (gastrointestinal stromal tumor), malignant (HCC)    Malignant neoplasm metastatic to liver (HCC)    Primary hypertension    Renal cell carcinoma (HCC)    Metastatic adenocarcinoma (HCC)    Colon cancer screening    GIST, malignant (HCC)    Postprocedural pneumothorax    Nausea and vomiting    Pancreatic pseudocyst (HCC)        History of Present Illness:  Metastatic GIST     Oncology history:  2002 - incidentally found small bowel GIST (age 27) when undergoing a left nephrectomy for RCC; evaluated at [facility] by Dr. Wagner and given 1 yr of adjuvant imatinib followed by serial imaging    7/31/20 - CT w/ subtle recurrent intraperitoneal mass lesion, including 5.6 x 4.0 x 5.1 cm L central mesenteric mass w/ central necrosis c/f recurrent GIST    8/20/20 - resection at [facility] ([place]) of ~20 peritoneal masses, 1 adherent to sigmoid colon; 4 main tumors (largest ~7 cm, smallest ~2 cm), and 2 cm tumor inside of small bowel mesentery, no tumors ruptured, 16 very tiny tumors (mostly on surface of small intestine); NSG w/ exon 11 KIT [id number]; path w/ GIST, spindle cell type, low grade (mitotic rate 0/5 mm2); peritoneal masses were 6.8 cm and 4.2 cm; mesenteric nodule was 2.8 cm; assorted peritoneal nodules  were < 0.7 cm; R pelvic mass was 3.3 cm    10/2/20 - started on imatinib by Yesica Jorge at [facility]  11/11/20 - genetic counseling, germline testing negative    11/27/20 - CT w/ single residual mass LUQ lesion, slightly decreased; one additional very small possible nodule adjacent to duodenal jejunal junction; mass either in posterior peritoneum or RP in LUQ at caudal margin of spleen, 3.0 x 2.3 cm    12/4/20 - only on brand name Gleevec x1 mo w/ tingling on scalp, periorbital edema, and facial tingling; MR brain negative; switched to generic imatinib w/ significant improvement in sx; takes imatinib w/ Compazine to help nausea    2/22/21 - CT w/ mild decrease of LUQ mass adjacent to distal duodenum, likely residual GIST; no e/o mets; mild dcrease in mass in posterior peritoneum vs RP, 2.8 x 1.6 cm, less conspicuous adjacent nodularity    6/21/21 - CT w/ LUQ mass no significant change; no new lesions; nodularity adjacent to 3rd/4th portion of duodenum similar, 9 x 7 mm    9/24/21 - CT w/ increased nodules from proximal descending colon and 4th portin of duodenum c/w enlarging GIST tumors    11/5/21 - CT w/ increased nodules size abutting 4th portion of duodenum/proximal descending colon; stable 0.8 cm nodule in medial R abdominis rectus muscle c/f tumor; 3.2 x 2.4 cm nodule inferior to spleen/proximal descending colon; 1.8 x 1.3 cm nodule anterior to 3rd/4th portion of duodenum; 0.8 x 0.6 cm nodule in medial aspect of R abdominis muscle    11/20/21 - increased imatinib to 400 mg BID  1/3/22 - slightly increased L-sided abdominal pain  1/25/22 - CT stable  4/29/22 CT AP: stable  8/1/22 CT AP: stable  12/6/22 CT AP  No significant change in heterogeneous mass in the left retroperitoneum and small soft  tissue nodule in the right rectus abdominis muscle. No CT evidence of new metastatic disease.  4/17/23 CT AP: stable exam  12/5/23 CT AP stable exam  4/23/24 CT AP  Increase in size of mass in the left renal fossa    Guardant KIT [id number], exon 11  6/3/24 day 1 cycle 1 sunatenib   11/13/2025: Interval decrease in size of the left perisplenic/renal fossa mass and lesion within the right rectus abdominis muscle.   2/11/2025: Continued decrease in size  3/14/2025: Resection recurrent GIST tumor with en-bloc partial resection of left diaphragm, partial pancreatectomy, and splenectomy. Cytoreduction multiple jejunal nodules. Left chest tube placement with complex repair left diaphragm. Intraoperative ultrasound. Lysis of adhesions. --> Pathology GIST tumor, lymph nodes negative.   She feels like she has turned a corner. She ate soup and mac n cheese yesterday. She denies nausea or vomiting. She is tolerating water and propel. She is having bowel movements once a day. She notes her urine is slightly orange. She has 1 day left of antibiotics. She is taking tylenol for abdominal pain. She is doing 1 protein shake a day.        Vital Signs:  /77 (BP Location: Right arm, Patient Position: Sitting, Cuff Size: adult)   Pulse 76   Temp 98 °F (36.7 °C)   Resp 16   Wt 73 kg (161 lb)   LMP 03/01/2025 (Exact Date)   SpO2 96%   BMI 25.99 kg/m²      Medications Reviewed:    Current Outpatient Medications:     levoFLOXacin 750 MG Oral Tab, Take 1 tablet (750 mg total) by mouth daily for 7 days., Disp: 7 tablet, Rfl: 0    metroNIDAZOLE 500 MG Oral Tab, Take 1 tablet (500 mg total) by mouth 3 (three) times daily for 7 days., Disp: 21 tablet, Rfl: 0    oxyCODONE HCl 10 MG Oral Tablet Abuse-Deterrent, Take 1 tablet by mouth every 6 (six) hours as needed (pain)., Disp: 15 tablet, Rfl: 0    metoclopramide 10 MG Oral Tab, Take 1 tablet (10 mg total) by mouth 3 (three) times daily as needed., Disp: 20 tablet, Rfl: 0    oxyCODONE 10 MG Oral Tab, Take 1 tablet (10 mg total) by mouth every 4 (four) hours as needed for Pain., Disp: 30 tablet, Rfl: 0    docusate sodium 100 MG Oral Cap, Take 100 mg by mouth 2 (two) times daily., Disp: 60  capsule, Rfl: 0    Omeprazole 40 MG Oral Capsule Delayed Release, Take 1 capsule (40 mg total) by mouth daily., Disp: , Rfl:     oxyCODONE 5 MG Oral Tab, Take 1 tablet (5 mg total) by mouth every 6 (six) hours as needed., Disp: 15 tablet, Rfl: 0    enoxaparin 40 MG/0.4ML Injection Solution Prefilled Syringe, Inject 0.4 mL (40 mg total) into the skin daily., Disp: 24 each, Rfl: 0    ondansetron (ZOFRAN) 4 mg tablet, Take 1 tablet (4 mg total) by mouth every 6 (six) hours as needed., Disp: 12 tablet, Rfl: 0    Naloxone HCl 4 MG/0.1ML Nasal Liquid, 4 mg by Nasal route as needed. If patient remains unresponsive, repeat dose in other nostril 2-5 minutes after first dose., Disp: 1 kit, Rfl: 0    amLODIPine 5 MG Oral Tab, Take 1 tablet (5 mg total) by mouth daily., Disp: , Rfl:      Allergies Reviewed:  Allergies[1]     History:  Reviewed:  Past Medical History:    Abdominal hernia    Abdominal pain    Arthritis    Bloating    Decorative tattoo    Esophageal reflux    Fatigue    Flatulence/gas pain/belching    Gastrointestinal stromal tumor (GIST) (HCC)    followed at [facility], on chemo    Headache disorder    Heartburn    High blood pressure    Hx of motion sickness    IBS (irritable bowel syndrome)    Indigestion    Leaking of urine    Pain with bowel movements    Personal history of antineoplastic chemotherapy    LAST ORAL TREATMENT    PONV (postoperative nausea and vomiting)    Renal cell carcinoma, left (HCC)    Renal disorder    RIGHT KIDNEY ONLY, LEFT KIDNEY REMOVED 2003    Stool incontinence    Stress    Uncomfortable fullness after meals    Visual impairment    GLASSES    Wears glasses    Weight loss      Reviewed:  Past Surgical History:   Procedure Laterality Date    Bowel resection  08/20/2020    25 tumors resected from small bowel    Colonoscopy N/A 03/11/2025    Procedure: COLONOSCOPY with biopsies;  Surgeon: Keyshawn Marshall MD;  Location:  ENDOSCOPY    Nephrectomy Left 11/2002    partial small bowel  resection    Other surgical history  03/14/2025    Resection of recurrent/metastatic gastrointestinal stromal tumor with en bloc partial resection of left diaphragm, partial distal pancreatectomy, and splenectomy.   2.       Cytoreduction of multiple jejunal nodules.   3.       Complex repair of left diaphragm.   4.       Left chest tube placement. 5.       Intraoperative ultrasound. 6.       Lysis of adhesions.      Reviewed Social History:  Social History     Socioeconomic History    Marital status:    Tobacco Use    Smoking status: Never    Smokeless tobacco: Never   Vaping Use    Vaping status: Never Used   Substance and Sexual Activity    Alcohol use: Yes     Comment: 1X/MONTH    Drug use: Never   Social History Narrative    , 2 children.  Worked as  at [employer]. 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  Walking for exercise.      Social Drivers of Health     Food Insecurity: No Food Insecurity (3/14/2025)    NCSS - Food Insecurity     Worried About Running Out of Food in the Last Year: No     Ran Out of Food in the Last Year: No   Transportation Needs: No Transportation Needs (3/14/2025)    NCSS - Transportation     Lack of Transportation: No   Housing Stability: Not At Risk (3/14/2025)    NCSS - Housing/Utilities     Has Housing: Yes     Worried About Losing Housing: No     Unable to Get Utilities: No      Reviewed:  Family History   Problem Relation Age of Onset    Diabetes Mother     Thyroid Disorder Sister     Cancer Son 1        Wilms      Review of Systems:  Doing well post-operatively  Denies fever or chills  Denies chest pain or SOB  Denies abdominal pain. N/V  Denies dysuria or hematuria  Denies warmth, erythema, or drainage at incision       Physical Examination:  Constitutional:  NAD.   Eyes: non-icteric.    Abdomen: Soft, non-tender, non-distended. Incision healing well without drainage or erythema.  Musculoskeletal: no edema.     Document Review:  FINDINGS:   Radiographic resolution of the small left apical pneumothorax.  Lung volumes are satisfactory.  Decreasing opacities at the left lung base with better definition of the medial diaphragm contour.  Minimal residual.  The right lung remains clear    and the CP angle sharp.  Upper abdominal midline surgical staples have been removed.  Heart and pulmonary vessels appear stable, normal caliber.  Smooth mediastinal contours.      Procedure(s):  None     Assessment / Plan:  GIST  Pancreatic Pseudocyst  - No acute surgical oncology issues  - Incision site healing well without erythema or drainage  - Completion of ABX in 1 day  - CXR with resolution of pneumothorax  - Continue reglan for GI motility and zofran for nausea  - Continue soft diet, small frequent meals throughout the day  - Continue PPI BID  - Reviewed signs and symptoms to call the office or return to clinic sooner      Follow Up:  [facility] with Medical Oncology        Electronically Signed by: MAXWELL Rose           [1]   Allergies  Allergen Reactions    Codeine ITCHING, SHORTNESS OF BREATH, SWELLING and TONGUE SWELLING    Opioid Analgesics ITCHING, OTHER (SEE COMMENTS) and SHORTNESS OF BREATH    Penicillins RASH, SHORTNESS OF BREATH, SWELLING and TONGUE SWELLING

## 2025-04-04 ENCOUNTER — TELEPHONE (OUTPATIENT)
Age: 50
End: 2025-04-04

## 2025-04-09 ENCOUNTER — OFFICE VISIT (OUTPATIENT)
Dept: PHYSICAL THERAPY | Facility: HOSPITAL | Age: 50
End: 2025-04-09
Attending: SURGERY
Payer: COMMERCIAL

## 2025-04-09 PROCEDURE — 97110 THERAPEUTIC EXERCISES: CPT

## 2025-04-10 ENCOUNTER — OFFICE VISIT (OUTPATIENT)
Dept: PHYSICAL THERAPY | Facility: HOSPITAL | Age: 50
End: 2025-04-10
Attending: SURGERY
Payer: COMMERCIAL

## 2025-04-10 PROCEDURE — 97110 THERAPEUTIC EXERCISES: CPT

## 2025-04-10 NOTE — PROGRESS NOTES
Patient: Mark Stearns (50 year old, female) Referring Provider:  Insurance:   Diagnosis: Decreased functional mobility and endurance (Z74.09) [place]  [place]   Date of Surgery: 3/14/25 Next MD visit:  N/A   Precautions:  Cancer none currently scheduled Referral Information:    Date of Evaluation: Req: 0, Auth: 0, Exp:     03/24/25 POC Auth Visits:  16       Today's Date   4/10/2025    Subjective  Patient reports she has a little soreness in the abs from yesterday but not too bad.  No soreness in the upper or lower body.       Pain: 0/10     Objective  Treatment per table            Assessment  Incorporated self hip flexor/quad stretch with strap at EOB,SB bridge, and seated SB bicep curl to HEP today.  Patient noting mild \"pulling\" in the upper aspect of the incision with hip flexor/quad stretch but this is tolerable.  No c/o pain with therex but does note tensioning along the incision when core muscles are engaging.    Goals (to be met in 16 visits)   Patient demonstrating ability to perform sit<>supine transfer independently using log roll technique for greater independence with transitional movements  Patient demonstrating ability to perform sit<>stand transfer independently without UE support for greater functional strength and independence with ADL  Patient demonstrating hip/LE strength 5/5 to promote greater ease with ADL such as prolonged walking and transitional movements  Patient demonstrating normalized, independent gait with increased endurance, tolerating >20 minutes of walking   Patient demonstrating improved core strength >=2/5 for Sahrmann low ab testing with ability to perform proper abdominal bracing without requiring verbal/tactile cuing for improved core stabilization with ADL   Patient demonstrating improved functional strength, reporting ability to perform ADL such as loading/unloading the  and light lifting activities without pain or difficutly.            Plan  continue per plan of care.    Treatment Last 4 Visits  Treatment Day: 5       4/1/2025 4/3/2025 4/9/2025 4/10/2025   LE Treatment   Therapeutic Exercise Seated scap retrxn w/ B ER 2x10 RTB  GTB seated scap retrxn w/ row 2x10  Seated glute set x12, 3 sec hold  Seated LAQ w/ 3# ankle weight 2x10 B  Trial sdly clam 2x10 - additional edu in modification for seated clam w/ tband if needed  Discussion regarding transitioning to walking w/o walker (encouraged shorter distances to start and slowly progress as tolerated for community distances) RTB seated horizontal abd 2 x 10 reps   RTB ER 2 x 10 reps   Bicep curls 2 # x 10   Seated glute set 2 x 10, 3 sec hold   LAQ 3 # 2 x 10 R/L   Seated hip adduction 2 x 10, 3 sec hold   Mini squats 2 x 10 reps   Side steps in // bars x 2 laps   Forward/backward monster walks in // bars x 2 laps  Nustep L 5x5'  Shuttle DLP 3B x30  Shuttle SLP 1B 1G x30 each R/L  DC no pin split stance fwd press x10 each R/L  Foot slider retro lunge w/ ski poles x10 each R/L  Bosu squat x15  A/P tilt board tap x30 Upright bike, varying resistance x7'  A/P tilt board tap x30  A/P tilt board stabilization x1 min   YTB lateral walk x2 laps  EOB hip flexor/quad stretch w/ strap 2x30\" each  SB bridge x10  Seated SB 3# bicep curl 2x15     Therapeutic Exercise Minutes 45 40 40 45   Total Time Of Timed Procedures 45 40 40 45   Total Time Of Service-Based Procedures 0 0 0 0   Total Treatment Time 45 40 40 45   HEP Seated scap retrxn w/ B ER 2x10 RTB  GTB seated scap retrxn w/ row 2x10  Seated glute set 3x10, 3 sec hold  Seated LAQ w/ 3# ankle weight 2x10 B  Sdly clam 2x10 (w/ RTB - can modify to sitting if needed)   EOB hip flexor/quad stretch w/ strap 2x30\" each  SB bridge x10  Seated SB 3# bicep curl 2x15  Seated scap retrxn w/ B ER 2x10 RTB  GTB seated scap retrxn w/ row 2x10  Seated glute set 3x10, 3 sec hold  Seated LAQ w/ 3# ankle weight 2x10 B  Sdly clam 2x10 (w/ RTB - can modify to sitting if  needed)          HEP  EOB hip flexor/quad stretch w/ strap 2x30\" each  SB bridge x10  Seated SB 3# bicep curl 2x15  Seated scap retrxn w/ B ER 2x10 RTB  GTB seated scap retrxn w/ row 2x10  Seated glute set 3x10, 3 sec hold  Seated LAQ w/ 3# ankle weight 2x10 B  Sdly clam 2x10 (w/ RTB - can modify to sitting if needed)      Charges     3 NIKKI

## 2025-04-14 ENCOUNTER — APPOINTMENT (OUTPATIENT)
Dept: PHYSICAL THERAPY | Facility: HOSPITAL | Age: 50
End: 2025-04-14
Attending: SURGERY
Payer: COMMERCIAL

## 2025-04-16 ENCOUNTER — OFFICE VISIT (OUTPATIENT)
Dept: PHYSICAL THERAPY | Facility: HOSPITAL | Age: 50
End: 2025-04-16
Attending: SURGERY
Payer: COMMERCIAL

## 2025-04-16 PROCEDURE — 97110 THERAPEUTIC EXERCISES: CPT

## 2025-04-16 NOTE — PROGRESS NOTES
Patient: Mark Stearns (50 year old, female) Referring Provider:  Insurance:   Diagnosis: Decreased functional mobility and endurance (Z74.09) [place]  [place]   Date of Surgery: 3/14/25 Next MD visit:  N/A   Precautions:  Cancer none currently scheduled Referral Information:    Date of Evaluation: Req: 0, Auth: 0, Exp:     03/24/25 POC Auth Visits:  16       Today's Date   4/16/2025    Subjective  Patient reports she has been walking and doing her HEP daily and doing well.  States she took 1 day off this weekend because she could tell her core muscles were getting sore but otherwise has been doing well.  Is walking longer distances now without the abdominal brace/corset.       Pain: 0/10     Objective  Treatment per table         Assessment  Patient continues to demonstrate excellent progress with therex, demonstrating improved core recruitment and proximal stabilization.  Incorporated TRX activities today with patient tolerating this well but modifying decree of incline as needed so as not to overload the core.    Goals (to be met in 16 visits)   Patient demonstrating ability to perform sit<>supine transfer independently using log roll technique for greater independence with transitional movements  Patient demonstrating ability to perform sit<>stand transfer independently without UE support for greater functional strength and independence with ADL  Patient demonstrating hip/LE strength 5/5 to promote greater ease with ADL such as prolonged walking and transitional movements  Patient demonstrating normalized, independent gait with increased endurance, tolerating >20 minutes of walking   Patient demonstrating improved core strength >=2/5 for Sahrmann low ab testing with ability to perform proper abdominal bracing without requiring verbal/tactile cuing for improved core stabilization with ADL   Patient demonstrating improved functional strength, reporting ability to perform ADL such as  loading/unloading the  and light lifting activities without pain or difficutly.             Plan  Continue to advance strengthening of the core, upper body, and hips/LEs as tolerated for improved functional strength and mobility with ADL.    Treatment Last 4 Visits  Treatment Day: 6       4/3/2025 4/9/2025 4/10/2025 4/16/2025   LE Treatment   Therapeutic Exercise RTB seated horizontal abd 2 x 10 reps   RTB ER 2 x 10 reps   Bicep curls 2 # x 10   Seated glute set 2 x 10, 3 sec hold   LAQ 3 # 2 x 10 R/L   Seated hip adduction 2 x 10, 3 sec hold   Mini squats 2 x 10 reps   Side steps in // bars x 2 laps   Forward/backward monster walks in // bars x 2 laps  Nustep L 5x5'  Shuttle DLP 3B x30  Shuttle SLP 1B 1G x30 each R/L  DC no pin split stance fwd press x10 each R/L  Foot slider retro lunge w/ ski poles x10 each R/L  Bosu squat x15  A/P tilt board tap x30 Upright bike, varying resistance x7'  A/P tilt board tap x30  A/P tilt board stabilization x1 min   YTB lateral walk x2 laps  EOB hip flexor/quad stretch w/ strap 2x30\" each  SB bridge x10  Seated SB 3# bicep curl 2x15   Bike L2 x8'  [Shuttle DLP 3B x30]  [Shuttle SLP 1B 1G x30 each R/L]  [DC no pin split stance fwd press x10 each R/L]  [Foot slider retro lunge w/ ski poles x10 each R/L]  Bosu squat  (flat side up) x20  Bosu fsu and through (flat side up) x20 each R/L  A/P tilt board tap x30  A/P tilt board stabilization x1 min  Wall squat x30  TRX inverted row x20  TRX incline push up x15   Therapeutic Exercise Minutes 40 40 45 45   Total Time Of Timed Procedures 40 40 45 45   Total Time Of Service-Based Procedures 0 0 0 0   Total Treatment Time 40 40 45 45   HEP   EOB hip flexor/quad stretch w/ strap 2x30\" each  SB bridge x10  Seated SB 3# bicep curl 2x15  Seated scap retrxn w/ B ER 2x10 RTB  GTB seated scap retrxn w/ row 2x10  Seated glute set 3x10, 3 sec hold  Seated LAQ w/ 3# ankle weight 2x10 B  Sdly clam 2x10 (w/ RTB - can modify to sitting if  needed)           HEP  EOB hip flexor/quad stretch w/ strap 2x30\" each  SB bridge x10  Seated SB 3# bicep curl 2x15  Seated scap retrxn w/ B ER 2x10 RTB  GTB seated scap retrxn w/ row 2x10  Seated glute set 3x10, 3 sec hold  Seated LAQ w/ 3# ankle weight 2x10 B  Sdly clam 2x10 (w/ RTB - can modify to sitting if needed)      Charges     3 NIKKI

## 2025-04-21 ENCOUNTER — OFFICE VISIT (OUTPATIENT)
Dept: PHYSICAL THERAPY | Facility: HOSPITAL | Age: 50
End: 2025-04-21
Attending: SURGERY
Payer: COMMERCIAL

## 2025-04-21 PROCEDURE — 97110 THERAPEUTIC EXERCISES: CPT

## 2025-04-21 NOTE — PROGRESS NOTES
Patient: Mark Stearns (50 year old, female) Referring Provider:  Insurance:   Diagnosis: Decreased functional mobility and endurance (Z74.09) Tom[place] Be  [place]   Date of Surgery: 3/14/25 Next MD visit:  N/A   Precautions:  Cancer none currently scheduled Referral Information:    Date of Evaluation: Req: 0, Auth: 0, Exp:     03/24/25 POC Auth Visits:  16       Today's Date   4/21/2025    Subjective  Has been doing HEP daily which is going well. Continues to be walking 2-3 miles without difficulty, wants to start working toward jogging.       Pain: 0/10     Objective  Treatment per table            Assessment  Pt continues progressing well with strengthneing, reporting abdominal activation with exercises throughout. Pt asking about increasing cardio intensity to jogging, advised to begin with trialing increasing walking pace to tolerance.    Goals (to be met in 16 visits)   Patient demonstrating ability to perform sit<>supine transfer independently using log roll technique for greater independence with transitional movements  Patient demonstrating ability to perform sit<>stand transfer independently without UE support for greater functional strength and independence with ADL  Patient demonstrating hip/LE strength 5/5 to promote greater ease with ADL such as prolonged walking and transitional movements  Patient demonstrating normalized, independent gait with increased endurance, tolerating >20 minutes of walking   Patient demonstrating improved core strength >=2/5 for Sahrmann low ab testing with ability to perform proper abdominal bracing without requiring verbal/tactile cuing for improved core stabilization with ADL   Patient demonstrating improved functional strength, reporting ability to perform ADL such as loading/unloading the  and light lifting activities without pain or difficutly.       Plan  Continue to advance strengthening of the core, upper body, and hips/LEs as tolerated for  improved functional strength and mobility with ADL.    Treatment Last 4 Visits  Treatment Day: 7       4/9/2025 4/10/2025 4/16/2025 4/21/2025   LE Treatment   Therapeutic Exercise Nustep L 5x5'  Shuttle DLP 3B x30  Shuttle SLP 1B 1G x30 each R/L  DC no pin split stance fwd press x10 each R/L  Foot slider retro lunge w/ ski poles x10 each R/L  Bosu squat x15  A/P tilt board tap x30 Upright bike, varying resistance x7'  A/P tilt board tap x30  A/P tilt board stabilization x1 min   YTB lateral walk x2 laps  EOB hip flexor/quad stretch w/ strap 2x30\" each  SB bridge x10  Seated SB 3# bicep curl 2x15   Bike L2 x8'  [Shuttle DLP 3B x30]  [Shuttle SLP 1B 1G x30 each R/L]  [DC no pin split stance fwd press x10 each R/L]  [Foot slider retro lunge w/ ski poles x10 each R/L]  Bosu squat  (flat side up) x20  Bosu fsu and through (flat side up) x20 each R/L  A/P tilt board tap x30  A/P tilt board stabilization x1 min  Wall squat x30  TRX inverted row x20  TRX incline push up x15 -Upright bike lvl 2 x 7 min  -TRX rows x 20   -TRX incline push up 2 x 10 reps   -Bosu step up with march   -Lateral lunges on bosu x 15 R/L   -Split stance forward press with pin in 15 on ea side 2 x 15   -Seated on therapy ball lat pull downs with  pin in 15 on ea side x 15 pin in 20 x 15   -AP/ML balance board holds x 1 min ea   -SLS on airex with rebounder 2 x 10 R/L   -RTB forward/bkwd monster walks x 2 laps   RTB lateral walks x 2 laps   Therapeutic Exercise Minutes 40 45 45 45   Total Time Of Timed Procedures 40 45 45 45   Total Time Of Service-Based Procedures 0 0 0 0   Total Treatment Time 40 45 45 45   HEP  EOB hip flexor/quad stretch w/ strap 2x30\" each  SB bridge x10  Seated SB 3# bicep curl 2x15  Seated scap retrxn w/ B ER 2x10 RTB  GTB seated scap retrxn w/ row 2x10  Seated glute set 3x10, 3 sec hold  Seated LAQ w/ 3# ankle weight 2x10 B  Sdly clam 2x10 (w/ RTB - can modify to sitting if needed)            HEP  EOB hip flexor/quad stretch  w/ strap 2x30\" each  SB bridge x10  Seated SB 3# bicep curl 2x15  Seated scap retrxn w/ B ER 2x10 RTB  GTB seated scap retrxn w/ row 2x10  Seated glute set 3x10, 3 sec hold  Seated LAQ w/ 3# ankle weight 2x10 B  Sdly clam 2x10 (w/ RTB - can modify to sitting if needed)      Charges     3 TE

## 2025-04-23 ENCOUNTER — OFFICE VISIT (OUTPATIENT)
Dept: PHYSICAL THERAPY | Facility: HOSPITAL | Age: 50
End: 2025-04-23
Attending: SURGERY
Payer: COMMERCIAL

## 2025-04-23 PROCEDURE — 97110 THERAPEUTIC EXERCISES: CPT

## 2025-04-23 NOTE — PROGRESS NOTES
Patient: Mark Stearns (50 year old, female) Referring Provider:  Insurance:   Diagnosis: Decreased functional mobility and endurance (Z74.09) Tomás Be  [employer]   Date of Surgery: 3/14/25 Next MD visit:  N/A   Precautions:  Cancer none currently scheduled Referral Information:    Date of Evaluation: Req: 0, Auth: 0, Exp:     03/24/25 POC Auth Visits:  16       Today's Date   4/23/2025    Subjective  Reports a little bit of soreness in the abs after last time but not more than typical soreness. Yesterday walked 3 miles avg of 16:50 pace.       Pain: 0/10     Objective  Treatment per table            Assessment  Continues to tolerate progression of UE/LE strengthening well, reporting abdominal activation throughout exercises. Introduced bird dogs today with reported abdominal activation but pain free. Educated on exercises to incorporate in gym routine for home.    Goals (to be met in 16 visits)   Patient demonstrating ability to perform sit<>supine transfer independently using log roll technique for greater independence with transitional movements  Patient demonstrating ability to perform sit<>stand transfer independently without UE support for greater functional strength and independence with ADL  Patient demonstrating hip/LE strength 5/5 to promote greater ease with ADL such as prolonged walking and transitional movements  Patient demonstrating normalized, independent gait with increased endurance, tolerating >20 minutes of walking   Patient demonstrating improved core strength >=2/5 for Sahrmann low ab testing with ability to perform proper abdominal bracing without requiring verbal/tactile cuing for improved core stabilization with ADL   Patient demonstrating improved functional strength, reporting ability to perform ADL such as loading/unloading the  and light lifting activities without pain or difficutly.         Plan  Continue to advance strengthening of the core, upper body, and  hips/LEs as tolerated for improved functional strength and mobility with ADL.    Treatment Last 4 Visits  Treatment Day: 8       4/10/2025 4/16/2025 4/21/2025 4/23/2025   LE Treatment   Therapeutic Exercise Upright bike, varying resistance x7'  A/P tilt board tap x30  A/P tilt board stabilization x1 min   YTB lateral walk x2 laps  EOB hip flexor/quad stretch w/ strap 2x30\" each  SB bridge x10  Seated SB 3# bicep curl 2x15   Bike L2 x8'  [Shuttle DLP 3B x30]  [Shuttle SLP 1B 1G x30 each R/L]  [DC no pin split stance fwd press x10 each R/L]  [Foot slider retro lunge w/ ski poles x10 each R/L]  Bosu squat  (flat side up) x20  Bosu fsu and through (flat side up) x20 each R/L  A/P tilt board tap x30  A/P tilt board stabilization x1 min  Wall squat x30  TRX inverted row x20  TRX incline push up x15 -Upright bike lvl 2 x 7 min  -TRX rows x 20   -TRX incline push up 2 x 10 reps   -Bosu step up with march   -Lateral lunges on bosu x 15 R/L   -Split stance forward press with pin in 15 on ea side 2 x 15   -Seated on therapy ball lat pull downs with  pin in 15 on ea side x 15 pin in 20 x 15   -AP/ML balance board holds x 1 min ea   -SLS on airex with rebounder 2 x 10 R/L   -RTB forward/bkwd monster walks x 2 laps   RTB lateral walks x 2 laps Upright bike x 8 min   TRX rows x 20   TRX incline push up 2 x 10   Shuttle DLP 3B 1 G x 30   Shuttle SLP 1B 1G x30 each R/L  Bird dogs UEs only 2 x 10 R/L   Bird dogs LEs only 2 x 10 R/L   SLS on airex with 3 way taps 2 x 10 R/L   Seated on therapy ball lat pull downs with  pin in 20 on ea side x 20  Seated on therapy ball with 7# dumbbell bicep curls 2 x 12 R/L    Therapeutic Exercise Minutes 45 45 45 42   Total Time Of Timed Procedures 45 45 45 42   Total Time Of Service-Based Procedures 0 0 0 0   Total Treatment Time 45 45 45 42   HEP EOB hip flexor/quad stretch w/ strap 2x30\" each  SB bridge x10  Seated SB 3# bicep curl 2x15  Seated scap retrxn w/ B ER 2x10 RTB  GTB seated scap retrxn  w/ row 2x10  Seated glute set 3x10, 3 sec hold  Seated LAQ w/ 3# ankle weight 2x10 B  Sdly clam 2x10 (w/ RTB - can modify to sitting if needed)             HEP  EOB hip flexor/quad stretch w/ strap 2x30\" each  SB bridge x10  Seated SB 3# bicep curl 2x15  Seated scap retrxn w/ B ER 2x10 RTB  GTB seated scap retrxn w/ row 2x10  Seated glute set 3x10, 3 sec hold  Seated LAQ w/ 3# ankle weight 2x10 B  Sdly clam 2x10 (w/ RTB - can modify to sitting if needed)      Charges     3 TE

## 2025-04-28 ENCOUNTER — OFFICE VISIT (OUTPATIENT)
Dept: PHYSICAL THERAPY | Facility: HOSPITAL | Age: 50
End: 2025-04-28
Attending: SURGERY
Payer: COMMERCIAL

## 2025-04-28 PROCEDURE — 97110 THERAPEUTIC EXERCISES: CPT

## 2025-04-28 NOTE — PROGRESS NOTES
Patient: Mark Stearns (50 year old, female) Referring Provider:  Insurance:   Diagnosis: Decreased functional mobility and endurance (Z74.09) Tomás Be  [employer]   Date of Surgery: 3/14/25 Next MD visit:  N/A   Precautions:  Cancer none currently scheduled Referral Information:    Date of Evaluation: Req: 0, Auth: 0, Exp:     03/24/25 POC Auth Visits:  16       Today's Date   4/28/2025    Subjective  Walked 5 miles on Saturday without difficulty. Has continued to increase walking pace, and usually first mile is 17-18 min/mi and then the following miles are 16 min pace.       Pain: 0/10     Objective  Treatment per table              Assessment  Pt reporting abdominal activation throughout therex  without increased pain. Progressed bird dogs today, with no increased pain in abdomen.    Goals (to be met in 16 visits)   Patient demonstrating ability to perform sit<>supine transfer independently using log roll technique for greater independence with transitional movements (MET 4/28/2025)  Patient demonstrating ability to perform sit<>stand transfer independently without UE support for greater functional strength and independence with ADL (MET 4/28/2025)  Patient demonstrating hip/LE strength 5/5 to promote greater ease with ADL such as prolonged walking and transitional movements  Patient demonstrating normalized, independent gait with increased endurance, tolerating >20 minutes of walking (MET 4/28/2025)  Patient demonstrating improved core strength >=2/5 for Sahrmann low ab testing with ability to perform proper abdominal bracing without requiring verbal/tactile cuing for improved core stabilization with ADL   Patient demonstrating improved functional strength, reporting ability to perform ADL such as loading/unloading the  and light lifting activities without pain or difficutly.           Plan  Continue to advance strengthening of the core, upper body, and hips/LEs as tolerated for improved  functional strength and mobility with ADL.    Treatment Last 4 Visits  Treatment Day: 9       4/16/2025 4/21/2025 4/23/2025 4/28/2025   LE Treatment   Therapeutic Exercise Bike L2 x8'  [Shuttle DLP 3B x30]  [Shuttle SLP 1B 1G x30 each R/L]  [DC no pin split stance fwd press x10 each R/L]  [Foot slider retro lunge w/ ski poles x10 each R/L]  Bosu squat  (flat side up) x20  Bosu fsu and through (flat side up) x20 each R/L  A/P tilt board tap x30  A/P tilt board stabilization x1 min  Wall squat x30  TRX inverted row x20  TRX incline push up x15 -Upright bike lvl 2 x 7 min  -TRX rows x 20   -TRX incline push up 2 x 10 reps   -Bosu step up with march   -Lateral lunges on bosu x 15 R/L   -Split stance forward press with pin in 15 on ea side 2 x 15   -Seated on therapy ball lat pull downs with  pin in 15 on ea side x 15 pin in 20 x 15   -AP/ML balance board holds x 1 min ea   -SLS on airex with rebounder 2 x 10 R/L   -RTB forward/bkwd monster walks x 2 laps   RTB lateral walks x 2 laps Upright bike x 8 min   TRX rows x 20   TRX incline push up 2 x 10   Shuttle DLP 3B 1 G x 30   Shuttle SLP 1B 1G x30 each R/L  Bird dogs UEs only 2 x 10 R/L   Bird dogs LEs only 2 x 10 R/L   SLS on airex with 3 way taps 2 x 10 R/L   Seated on therapy ball lat pull downs with  pin in 20 on ea side x 20  Seated on therapy ball with 7# dumbbell bicep curls 2 x 12 R/L  -Upright bike x 8 min   -Bosu step up with march 2 x 10 R/L   -Lateral lunges on Bosu 2 x 15 R/L   -Forward lunges on Bosu 2 x 15 R/L   -Bird dogs 2 x 5   -TRX rows x 25 reps   -TRX push ups 2 x 15 reps   -Seated HS curls BTB x 20 R/L    Therapeutic Exercise Minutes 45 45 42 45   Total Time Of Timed Procedures 45 45 42 45   Total Time Of Service-Based Procedures 0 0 0 0   Total Treatment Time 45 45 42 45        HEP  EOB hip flexor/quad stretch w/ strap 2x30\" each  SB bridge x10  Seated SB 3# bicep curl 2x15  Seated scap retrxn w/ B ER 2x10 RTB  GTB seated scap retrxn w/ row  2x10  Seated glute set 3x10, 3 sec hold  Seated LAQ w/ 3# ankle weight 2x10 B  Sdly clam 2x10 (w/ RTB - can modify to sitting if needed)      Charges     3 TE

## 2025-04-30 ENCOUNTER — OFFICE VISIT (OUTPATIENT)
Dept: PHYSICAL THERAPY | Facility: HOSPITAL | Age: 50
End: 2025-04-30
Attending: SURGERY
Payer: COMMERCIAL

## 2025-04-30 PROCEDURE — 97110 THERAPEUTIC EXERCISES: CPT

## 2025-04-30 PROCEDURE — 97112 NEUROMUSCULAR REEDUCATION: CPT

## 2025-04-30 NOTE — PROGRESS NOTES
Patient: Mark Stearns (50 year old, female) Referring Provider:  Insurance:   Diagnosis: Decreased functional mobility and endurance (Z74.09) Tomás Rosenbergred  [employer]   Date of Surgery: 3/14/25 Next MD visit:  N/A   Precautions:  Cancer none currently scheduled Referral Information:    Date of Evaluation: Req: 0, Auth: 0, Exp:     03/24/25 POC Auth Visits:  16       Today's Date   4/30/2025    Subjective  Patient reports she continues to be aware of occasional \"pulling\" pain in the right upper abdomen just lateral to her healed incision with certain activities such as overhead reaching with the R hand. Patient noting she does feel occasionally unsteay when walking, stating she is able to recover her balance but would like to also work on improving her balance.       Pain: 0/10     Objective  Treatment per table           Assessment  Incorporated additional core work today, issued L1 low ab march, bird dog, inclined plank, and inclined side plank to HEP today (pt edu in varying incline to decrease or increase difficulty). Patient requiring intintal tactile/verbal cuing with low ab march to ensure proper lumbar    Goals (to be met in 16 visits)   Patient demonstrating ability to perform sit<>supine transfer independently using log roll technique for greater independence with transitional movements  Patient demonstrating ability to perform sit<>stand transfer independently without UE support for greater functional strength and independence with ADL  Patient demonstrating hip/LE strength 5/5 to promote greater ease with ADL such as prolonged walking and transitional movements  Patient demonstrating normalized, independent gait with increased endurance, tolerating >20 minutes of walking   Patient demonstrating improved core strength >=2/5 for Sahrmann low ab testing with ability to perform proper abdominal bracing without requiring verbal/tactile cuing for improved core stabilization with ADL   Patient  demonstrating improved functional strength, reporting ability to perform ADL such as loading/unloading the  and light lifting activities without pain or difficutly.               Plan  Continue per plan of care.    Treatment Last 4 Visits  Treatment Day: 10       4/21/2025 4/23/2025 4/28/2025 4/30/2025   LE Treatment   Therapeutic Exercise -Upright bike lvl 2 x 7 min  -TRX rows x 20   -TRX incline push up 2 x 10 reps   -Bosu step up with march   -Lateral lunges on bosu x 15 R/L   -Split stance forward press with pin in 15 on ea side 2 x 15   -Seated on therapy ball lat pull downs with  pin in 15 on ea side x 15 pin in 20 x 15   -AP/ML balance board holds x 1 min ea   -SLS on airex with rebounder 2 x 10 R/L   -RTB forward/bkwd monster walks x 2 laps   RTB lateral walks x 2 laps Upright bike x 8 min   TRX rows x 20   TRX incline push up 2 x 10   Shuttle DLP 3B 1 G x 30   Shuttle SLP 1B 1G x30 each R/L  Bird dogs UEs only 2 x 10 R/L   Bird dogs LEs only 2 x 10 R/L   SLS on airex with 3 way taps 2 x 10 R/L   Seated on therapy ball lat pull downs with  pin in 20 on ea side x 20  Seated on therapy ball with 7# dumbbell bicep curls 2 x 12 R/L  -Upright bike x 8 min   -Bosu step up with march 2 x 10 R/L   -Lateral lunges on Bosu 2 x 15 R/L   -Forward lunges on Bosu 2 x 15 R/L   -Bird dogs 2 x 5   -TRX rows x 25 reps   -TRX push ups 2 x 15 reps   -Seated HS curls BTB x 20 R/L  Bike x6' L4  TA isometric (pt edu w/ verbal/tactile cuing for proper recruitment)  L1 sahrman low ab march x10  Inclined plank 2x20\"  Inclined side plank 2x15\"     Neuro Re-Education    Aeromat tandem stance w/ multiplanar UE reach x10 each R/L lead  SLS w/ EC R/L  SLS Airex w/ and w/o EC R/L   Manual Therapy    --   Therapeutic Exercise Minutes 45 42 45 35   Manual Therapy Minutes    10   Total Time Of Timed Procedures 45 42 45 45   Total Time Of Service-Based Procedures 0 0 0 0   Total Treatment Time 45 42 45 45        HEP  EOB hip  flexor/quad stretch w/ strap 2x30\" each  SB bridge x10  Seated SB 3# bicep curl 2x15  Seated scap retrxn w/ B ER 2x10 RTB  GTB seated scap retrxn w/ row 2x10  Seated glute set 3x10, 3 sec hold  Seated LAQ w/ 3# ankle weight 2x10 B  Sdly clam 2x10 (w/ RTB - can modify to sitting if needed)  L1 Sahrman low ab march 2x10  Bird dog 2x10  Inclined plank 3x20\"  Inclined side plank 2x20\" each R/L    Charges     2 NIKKI, Neuro Re-ed

## 2025-05-05 ENCOUNTER — OFFICE VISIT (OUTPATIENT)
Dept: PHYSICAL THERAPY | Facility: HOSPITAL | Age: 50
End: 2025-05-05
Attending: SURGERY
Payer: COMMERCIAL

## 2025-05-05 PROCEDURE — 97110 THERAPEUTIC EXERCISES: CPT

## 2025-05-05 PROCEDURE — 97112 NEUROMUSCULAR REEDUCATION: CPT

## 2025-05-05 NOTE — PROGRESS NOTES
Patient: Mark Stearns (50 year old, female) Referring Provider:  Insurance:   Diagnosis: Decreased functional mobility and endurance (Z74.09) Tomás Be  [employer]   Date of Surgery: 3/14/25 Next MD visit:  N/A   Precautions:  Cancer none currently scheduled Referral Information:    Date of Evaluation: Req: 0, Auth: 0, Exp:     03/24/25 POC Auth Visits:  16       Today's Date   5/5/2025    Subjective  Went to the doctor earlier because of the lump near incision and is getting a CT scan 5/13. Doctor mentioned to hold off on any scar mobilization until the results are back.       Pain: 0/10     Objective  Romberg on airex EC 20 sec inc sway            Assessment  Reviewed TrA activation and previous core exercises, requires minimal cueing with TrA activation today. Continued with balance progressions, pt reporting most difficulty with single leg balance tasks. Difficulty with tasks on compliant surface, will continue to progress    Goals (to be met in 16 visits)   Patient demonstrating ability to perform sit<>supine transfer independently using log roll technique for greater independence with transitional movements (MET 4/28/2025)  Patient demonstrating ability to perform sit<>stand transfer independently without UE support for greater functional strength and independence with ADL (MET 4/28/2025)  Patient demonstrating hip/LE strength 5/5 to promote greater ease with ADL such as prolonged walking and transitional movements  Patient demonstrating normalized, independent gait with increased endurance, tolerating >20 minutes of walking (MET 4/28/2025)  Patient demonstrating improved core strength >=2/5 for Sahrmann low ab testing with ability to perform proper abdominal bracing without requiring verbal/tactile cuing for improved core stabilization with ADL   Patient demonstrating improved functional strength, reporting ability to perform ADL such as loading/unloading the  and light lifting  activities without pain or difficutly.             Plan  Continue per plan of care.    Treatment Last 4 Visits  Treatment Day: 11       4/23/2025 4/28/2025 4/30/2025 5/5/2025   LE Treatment   Therapeutic Exercise Upright bike x 8 min   TRX rows x 20   TRX incline push up 2 x 10   Shuttle DLP 3B 1 G x 30   Shuttle SLP 1B 1G x30 each R/L  Bird dogs UEs only 2 x 10 R/L   Bird dogs LEs only 2 x 10 R/L   SLS on airex with 3 way taps 2 x 10 R/L   Seated on therapy ball lat pull downs with  pin in 20 on ea side x 20  Seated on therapy ball with 7# dumbbell bicep curls 2 x 12 R/L  -Upright bike x 8 min   -Bosu step up with march 2 x 10 R/L   -Lateral lunges on Bosu 2 x 15 R/L   -Forward lunges on Bosu 2 x 15 R/L   -Bird dogs 2 x 5   -TRX rows x 25 reps   -TRX push ups 2 x 15 reps   -Seated HS curls BTB x 20 R/L  Bike x6' L4  TA isometric (pt edu w/ verbal/tactile cuing for proper recruitment)  L1 sahrman low ab march x10  Inclined plank 2x20\"  Inclined side plank 2x15\"   Upright bike lvl 4 x 6 min   TrA isometric, minimal verbal and tactile cueing required today  L1 sahrman low ab march 2x10   Bird dogs 2 x 10 R/L   Incline front plank 30 sec x 2    Neuro Re-Education   Aeromat tandem stance w/ multiplanar UE reach x10 each R/L lead  SLS w/ EC R/L  SLS Airex w/ and w/o EC R/L SLS on airex 20 sec x 5 R/L   SLS on flat ground EC x 4 rounds R/L   Airex balance beam tandem walks x 6 rounds   SLS with ball toss back and forth with PT x 10 sec R/L x 5 ea    Manual Therapy   --    Therapeutic Exercise Minutes 42 45 35 20   Neuro Re-Educ Minutes    25   Manual Therapy Minutes   10    Total Time Of Timed Procedures 42 45 45 45   Total Time Of Service-Based Procedures 0 0 0 0   Total Treatment Time 42 45 45 45        HEP  EOB hip flexor/quad stretch w/ strap 2x30\" each  SB bridge x10  Seated SB 3# bicep curl 2x15  Seated scap retrxn w/ B ER 2x10 RTB  GTB seated scap retrxn w/ row 2x10  Seated glute set 3x10, 3 sec hold  Seated LAQ w/  3# ankle weight 2x10 B  Sdly clam 2x10 (w/ RTB - can modify to sitting if needed)      Charges     2 neuro , 1 TE

## 2025-05-06 ENCOUNTER — PATIENT MESSAGE (OUTPATIENT)
Dept: SURGERY | Facility: CLINIC | Age: 50
End: 2025-05-06

## 2025-05-07 ENCOUNTER — OFFICE VISIT (OUTPATIENT)
Dept: PHYSICAL THERAPY | Facility: HOSPITAL | Age: 50
End: 2025-05-07
Attending: SURGERY
Payer: COMMERCIAL

## 2025-05-07 PROCEDURE — 97110 THERAPEUTIC EXERCISES: CPT

## 2025-05-07 NOTE — PROGRESS NOTES
Patient: Mark Stearns (50 year old, female) Referring Provider:  Insurance:   Diagnosis: Decreased functional mobility and endurance (Z74.09) Tomás Rosenbergred  [employer]   Date of Surgery: 3/14/25 Next MD visit:  N/A   Precautions:  Cancer none currently scheduled Referral Information:    Date of Evaluation: Req: 0, Auth: 0, Exp:     03/24/25 POC Auth Visits:  16       Today's Date   5/7/2025    Subjective  Patient reports she has been doing pretty good but she has had some pain along the upper aspect of her healed incision site and is having this worked up with imaging.  Is not performing the SB bridge or side plank activity at home as she finds these 2 exercises irritate this same area.  Is noting N/T in both hands and feet, is reporting that her hands feel very stiff, gets cramping in her hands/feet, and sometimes feels uncoordinated when walking or \"off balance\". She does report she gets neck pain as well as pain between the shoulder blades at times.       Pain: pain not reported     Objective  Brief screening of the cervical spine today given subjective intake today.  Findings significant for limited side bend range B, limited L rotation, as well as UE weakness detected with MMT in cervical flexion as compared to cervical extension suggestive of possible cervical stenosis with myelopathy.  Reflex testing and Ashraf's test negative.            Assessment  Per objective, cervical screening is suggestive of possible cervical stenosis - discussed this with the patient and we are in agreement that we will continue to work on postural strengthening and spinal stabilization in PT and monitor symptoms, reaching out to MD if symptoms should worsen or become problematic.  Note mild dynamic knee valgus on L today with bosu squat and fsu so incorporated singel leg squat with tband ER and hip burner exercise to HEP today to work on hip abd/ER and focusing on LE alignment control.    Goals (to be met in 16 visits)    Patient demonstrating ability to perform sit<>supine transfer independently using log roll technique for greater independence with transitional movements  Patient demonstrating ability to perform sit<>stand transfer independently without UE support for greater functional strength and independence with ADL  Patient demonstrating hip/LE strength 5/5 to promote greater ease with ADL such as prolonged walking and transitional movements  Patient demonstrating normalized, independent gait with increased endurance, tolerating >20 minutes of walking   Patient demonstrating improved core strength >=2/5 for Sahrmann low ab testing with ability to perform proper abdominal bracing without requiring verbal/tactile cuing for improved core stabilization with ADL   Patient demonstrating improved functional strength, reporting ability to perform ADL such as loading/unloading the  and light lifting activities without pain or difficutly.                 Plan  see aboe assessment    Treatment Last 4 Visits  Treatment Day: 12 4/28/2025 4/30/2025 5/5/2025 5/7/2025   LE Treatment   Therapeutic Exercise -Upright bike x 8 min   -Bosu step up with march 2 x 10 R/L   -Lateral lunges on Bosu 2 x 15 R/L   -Forward lunges on Bosu 2 x 15 R/L   -Bird dogs 2 x 5   -TRX rows x 25 reps   -TRX push ups 2 x 15 reps   -Seated HS curls BTB x 20 R/L  Bike x6' L4  TA isometric (pt edu w/ verbal/tactile cuing for proper recruitment)  L1 sahrman low ab march x10  Inclined plank 2x20\"  Inclined side plank 2x15\"   Upright bike lvl 4 x 6 min   TrA isometric, minimal verbal and tactile cueing required today  L1 sahrman low ab march 2x10   Bird dogs 2 x 10 R/L   Incline front plank 30 sec x 2  Bosu DL squat x15  Bosu fsu/rsd x10 R/L  6\" fsu with GTB ER (mirror feedback) x20  Single leg squat w/ GTB ER 2x10  \"Hip burner\" against wall 10x3\" hold  Bosu lateral lunge (review)  Bosu fwd lunge (review)  Bosu lsu w/ flat side down x5   Neuro  Re-Education  Aeromat tandem stance w/ multiplanar UE reach x10 each R/L lead  SLS w/ EC R/L  SLS Airex w/ and w/o EC R/L SLS on airex 20 sec x 5 R/L   SLS on flat ground EC x 4 rounds R/L   Airex balance beam tandem walks x 6 rounds   SLS with ball toss back and forth with PT x 10 sec R/L x 5 ea     Manual Therapy  --     Additional Treatment    Cervical screening   Therapeutic Exercise Minutes 45 35 20 45   Neuro Re-Educ Minutes   25    Manual Therapy Minutes  10     Total Time Of Timed Procedures 45 45 45 45   Total Time Of Service-Based Procedures 0 0 0 0   Total Treatment Time 45 45 45 45        HEP  EOB hip flexor/quad stretch w/ strap 2x30\" each  SB bridge x10  Seated SB 3# bicep curl 2x15  Seated scap retrxn w/ B ER 2x10 RTB  GTB seated scap retrxn w/ row 2x10  Seated glute set 3x10, 3 sec hold  Seated LAQ w/ 3# ankle weight 2x10 B  Sdly clam 2x10 (w/ RTB - can modify to sitting if needed)      Charges     3 NIKKI

## 2025-05-12 ENCOUNTER — OFFICE VISIT (OUTPATIENT)
Dept: PHYSICAL THERAPY | Facility: HOSPITAL | Age: 50
End: 2025-05-12
Attending: SURGERY
Payer: COMMERCIAL

## 2025-05-12 ENCOUNTER — TELEPHONE (OUTPATIENT)
Dept: SURGERY | Facility: CLINIC | Age: 50
End: 2025-05-12

## 2025-05-12 PROCEDURE — 97110 THERAPEUTIC EXERCISES: CPT

## 2025-05-12 NOTE — PROGRESS NOTES
Patient: Mark Stearns (50 year old, female) Referring Provider:  Insurance:   Diagnosis: Decreased functional mobility and endurance (Z74.09) [place]  [employer] [place]   Date of Surgery: 3/14/25 Next MD visit:  N/A   Precautions:  Cancer none currently scheduled Referral Information:    Date of Evaluation: Req: 0, Auth: 0, Exp:     03/24/25 POC Auth Visits:  16       Today's Date   5/12/2025    Subjective  Patient reports she has been feeling pretty good. States she is performing HEP and tolerating this well.  Notes the pain along the proximal incision has not been as significant and she is having this imaged tomorrow.       Pain: 0/10     Objective  Treatment per table         Assessment  Patient demonstrating improved self awareness for proper LE alignment control, self monitoring and correcting for dynamic knee valgus today without requiring verbal cuing.  Patient demonstrating excellent proximal stabilization with addition of Bsou fsu w/ overhead press today.  Consider adding in additional UE dynamic stabilization exercises next session to advance HEP as appropriate.    Goals (to be met in 16 visits)   Patient demonstrating ability to perform sit<>supine transfer independently using log roll technique for greater independence with transitional movements  Patient demonstrating ability to perform sit<>stand transfer independently without UE support for greater functional strength and independence with ADL  Patient demonstrating hip/LE strength 5/5 to promote greater ease with ADL such as prolonged walking and transitional movements  Patient demonstrating normalized, independent gait with increased endurance, tolerating >20 minutes of walking   Patient demonstrating improved core strength >=2/5 for Sahrmann low ab testing with ability to perform proper abdominal bracing without requiring verbal/tactile cuing for improved core stabilization with ADL   Patient demonstrating improved functional strength,  reporting ability to perform ADL such as loading/unloading the  and light lifting activities without pain or difficutly.                   Plan  Continue per plan of care.Consider adding in additional UE dynamic stabilization exercises next session to advance HEP as appropriate.    Treatment Last 4 Visits  Treatment Day: 13 4/30/2025 5/5/2025 5/7/2025 5/12/2025   LE Treatment   Therapeutic Exercise Bike x6' L4  TA isometric (pt edu w/ verbal/tactile cuing for proper recruitment)  L1 sahrman low ab march x10  Inclined plank 2x20\"  Inclined side plank 2x15\"   Upright bike lvl 4 x 6 min   TrA isometric, minimal verbal and tactile cueing required today  L1 sahrman low ab march 2x10   Bird dogs 2 x 10 R/L   Incline front plank 30 sec x 2  Bosu DL squat x15  Bosu fsu/rsd x10 R/L  6\" fsu with GTB ER (mirror feedback) x20  Single leg squat w/ GTB ER 2x10  \"Hip burner\" against wall 10x3\" hold  Bosu lateral lunge (review)  Bosu fwd lunge (review)  Bosu lsu w/ flat side down x5 Bike x6'  Single leg 1/4 wall squat 2x10 each R/L  Shuttle DLP w/ GTB ER 3x10  Inclined plank 8\" UE alt step up 2x10  Bosu fsu w/ 3# overhead press x10 each R/L  4\" box with Airex mat on top RTB fsu with ER 2x10 each R/L  Retro lunge w/ foot slider 2x10 each R/L   Neuro Re-Education Aeromat tandem stance w/ multiplanar UE reach x10 each R/L lead  SLS w/ EC R/L  SLS Airex w/ and w/o EC R/L SLS on airex 20 sec x 5 R/L   SLS on flat ground EC x 4 rounds R/L   Airex balance beam tandem walks x 6 rounds   SLS with ball toss back and forth with PT x 10 sec R/L x 5 ea      Manual Therapy --      Additional Treatment   Cervical screening    Therapeutic Exercise Minutes 35 20 45 40   Neuro Re-Educ Minutes  25     Manual Therapy Minutes 10      Total Time Of Timed Procedures 45 45 45 40   Total Time Of Service-Based Procedures 0 0 0 0   Total Treatment Time 45 45 45 40        HEP  EOB hip flexor/quad stretch w/ strap 2x30\" each  SB bridge  x10  Seated SB 3# bicep curl 2x15  Seated scap retrxn w/ B ER 2x10 RTB  GTB seated scap retrxn w/ row 2x10  Seated glute set 3x10, 3 sec hold  Seated LAQ w/ 3# ankle weight 2x10 B  Sdly clam 2x10 (w/ RTB - can modify to sitting if needed)      Charges     3 NIKKI

## 2025-05-14 ENCOUNTER — OFFICE VISIT (OUTPATIENT)
Dept: PHYSICAL THERAPY | Facility: HOSPITAL | Age: 50
End: 2025-05-14
Attending: SURGERY
Payer: COMMERCIAL

## 2025-05-14 PROCEDURE — 97110 THERAPEUTIC EXERCISES: CPT

## 2025-05-14 NOTE — PROGRESS NOTES
Dx:  Decreased functional mobility and endurance (Z74.09)   Date of surgery: 3/14/25  Date of Evaluation: 3/24/25  Referring provider: Tomás Ness # of visit: ALEJANDRO PPO; no visit limit, no auth POC visits: 16 visits  Next MD visits: 6/25/25  Fall Risk: standard Precautions: Cancer    Date: 5/14/25  Tx #: 14    Current Pain Level: 0/10      Subjective: Patient notes she is still having \"pressure or a straining sensation\" just to the right of her healed incision proximally.  States it is not terribly intense but she is aware of it.  No complaints of pain elsewhere.      Objective: Treatment per flow sheet.      Assessment: Patient tolerating addition of UE dynamic stabilization activities without c/o pain today and demonstrating good parascapular recruitment without UT bias.  Patient struggling a bit with balance during vector lunge activity as well as SLB ball toss on airex today and will benefit from continued balance/proximal stabilization work.      Plan: Continue to advance functional strength and mobility as tolerated.      Treatment Flow:  Therex:  Bike x5'  YTB Wall clock 2x5 cycles  YTB ER wall walk x 3laps  1G 1Y shuttle push up 2x10  Vector lunge triplanar UE cone reach x5 cycles each R/L  Airex SLB ball toss 2x5 each R/L  Bosu retro lunge toe tap x12 each R/L      Charges: 3 NIKKI  Total Timed Treatment: 45 minutes  Total Treatment Time: 45 minutes

## 2025-05-16 NOTE — TELEPHONE ENCOUNTER
Type of Leave: Continuous- Disab   Reason for Leave:    Start date of leave: 12/12/24  End date of leave: 08/08/25 08/09/25 full duty  Was Fee and Turnaround info Given?: yes  Called patient to advise we needs short term disability forms- located forms via [employer] - per patient ok to send forms to her once completed

## 2025-05-16 NOTE — TELEPHONE ENCOUNTER
Dr. Be/ Dayami ARREOLA      Please sign off on form if you agree to: Disability  Start date of leave: 12/12/24  End date of leave: 08/08/25 08/09/25 full duty  (place your signature on the first page only)    -From your Inbasket, Highlight the patient and click Chart   -Double click the 5/12/2025 Forms Completion telephone encounter  -Scroll down to the Media section   -Click the blue Hyperlink: Disab Dr Be 5/16/25  -Click Acknowledge located in the top right ribbon/menu   -Drag the mouse into the blank space of the document and a + sign will appear. Left click to   electronically sign the document.     Thank you,    Lakshmi AREVALO

## 2025-05-19 ENCOUNTER — OFFICE VISIT (OUTPATIENT)
Dept: PHYSICAL THERAPY | Facility: HOSPITAL | Age: 50
End: 2025-05-19
Attending: SURGERY
Payer: COMMERCIAL

## 2025-05-19 PROCEDURE — 97110 THERAPEUTIC EXERCISES: CPT

## 2025-05-19 NOTE — TELEPHONE ENCOUNTER
Called patient to explain to her PA message below - per patient she did transfer care and thought it will be appropriate to add dates since that was her first day off work -- and forms asked this question- patient states forms processed are incorrect and will be sending new forms for continuing care - advised patient she can send forms in the same [email or web address] message she sent the first forms and she agreed to adding dates PA suggested we added-    Pending New forms to process

## 2025-05-19 NOTE — PROGRESS NOTES
Dx:  Decreased functional mobility and endurance (Z74.09)   Date of surgery: 3/14/25  Date of Evaluation: 3/24/25  Referring provider: Tomás Ness # of visit: ALEJANDRO PPO; no visit limit, no auth POC visits: 16 visits  Next MD visits: 6/25/25  Fall Risk: standard Precautions: Cancer    Date: 5/19/25  Tx #: 15    Current Pain Level: 0/10      Subjective: Patient notes she is still having \"pressure or a straining sensation\" just to the right of her healed incision proximally.  States it is not terribly intense but she is aware of it.  No complaints of pain elsewhere.      Objective: Treatment per flow sheet.      Assessment: Patient continues to demonstrate progress with proximal stabilization, benefiting from verbal cuing to \"stay tall\" during bosu balance work in SLS today to facilitate improved core facilitation.  Patient noting some difficulty with addition of Body Blade activity and will benefit from continued progression of proximal stabilization training throughout the upper body - pt edu in tband ABC dynamic stabilization exercise to duplicate body blade type exercise at home.      Plan: Reassess next session for progress note/extension of plan of care as needed.      Treatment Flow:  Therex:  Body Blade: x30\" each R/L; up/down, side-to-side  (Pt edu in/trial of tband ABC UE dynamic stabilization for HEP)  Vector lunge triplanar UE cone reach x5 cycles each R/L  Vector lunge fwd cone reach on Airex 2x5 each R/L  Bosu SLS w/ 3# fwd flxn and scaption 2x10 each R/L  Bosu SLS w/ multi-planar UE reach x10 each R/L  8\" [place] squat on Airex 2x10 each R/L  Shuttle 4-pt hip ext 1G 1Y 2x15 each R/L      Charges: 3 NIKKI  Total Timed Treatment: 40 minutes (patient a few minutes late due to traffic today)  Total Treatment Time: 40 minutes

## 2025-05-19 NOTE — TELEPHONE ENCOUNTER
Dr. Be/ Dayami ARREOLA        Please sign off on form if you agree to: Disability  Start date of leave: 12/12/24  End date of leave: 08/08/25 08/09/25 full duty  (place your signature on the first page only)     -From your Inbasket, Highlight the patient and click Chart   -Double click the 5/12/2025 Forms Completion telephone encounter  -Scroll down to the Media section   -Click the blue Hyperlink: Disab 2 05/19/25  -Click Acknowledge located in the top right ribbon/menu   -Drag the mouse into the blank space of the document and a + sign will appear. Left click to   electronically sign the document.     Thank you,     Lakshmi AREVALO

## 2025-05-20 ENCOUNTER — TELEPHONE (OUTPATIENT)
Dept: SURGERY | Facility: CLINIC | Age: 50
End: 2025-05-20

## 2025-05-21 ENCOUNTER — OFFICE VISIT (OUTPATIENT)
Dept: PHYSICAL THERAPY | Facility: HOSPITAL | Age: 50
End: 2025-05-21
Attending: SURGERY
Payer: COMMERCIAL

## 2025-05-21 DIAGNOSIS — R19.03 ABDOMINAL WALL MASS OF RIGHT LOWER QUADRANT: Primary | ICD-10-CM

## 2025-05-21 DIAGNOSIS — C49.A0 GASTROINTESTINAL STROMAL TUMOR (GIST) (HCC): ICD-10-CM

## 2025-05-21 PROCEDURE — 97110 THERAPEUTIC EXERCISES: CPT

## 2025-05-21 NOTE — TELEPHONE ENCOUNTER
Dr. Be/ Dayami ARREOLA       Please sign off on form if you agree to: Disability  Start: 03/14/25 End: 06/08/25 RTW: 06/09/25   (place your signature on the first page only)    -From your Inbasket, Highlight the patient and click Chart   -Double click the 05/12/25 Forms Completion telephone encounter  -Scroll down to the Media section   -Click the blue Hyperlink: Disab 2 Dr Be 05/21/25  -Click Acknowledge located in the top right ribbon/menu   -Drag the mouse into the blank space of the document and a + sign will appear. Left click to   electronically sign the document.     Thank you,    Lakshmi AREVALO     Go to [facility] now for your blood draw.  You will be contacted in 1-2 days for results of your lab tests.    Be consistent with carb-controlled, low salt,  low trans fat and saturated fat diet.  Eat food rich in omega-3-fatty acids as you tolerate. (salmon, olive oil)  Eat 5 cups of vegetables, fruits and whole grains per day.  Limit starchy food (white rice, white bread, white pasta, white potatoes) to less than a cup per meal.  Minimize sweets, junk food and fastfood. Limit soda beverages to one serving per day; best to avoid it altogether though.  Exercise: moderate intensity sustained for at least 30 mins per episode, goal of 150 mins per week at least  Combine cardiovascular and resistance exercises.  These exercise recommendations are in addition to your daily activity at work or home.    Eye exam every year.    Don't walk barefoot. Check feet for sores or skin breaks everyday. See a doctor if any are present and not healing.    Flu shot every year.    Read the [employer] handout, and fill out the Advance Directives form.  You may have it notarized or witnessed, and provide clinic a copy of the form.    Preventive Health Recommendations:     See your health care provider every year to    Review health changes.     Discuss preventive care.      Review your medicines if your doctor has prescribed any.    Talk with your health care provider about whether you should have a test to screen for prostate cancer (PSA).    Every 3 years, have a diabetes test (fasting glucose). If you are at risk for diabetes, you should have this test more often.    Every 5 years, have a cholesterol test. Have this test more often if you are at risk for high cholesterol or heart disease.     Every 10 years, have a colonoscopy. Or, have a yearly FIT test (stool test). These exams will check for colon cancer.    Talk to with your health care provider about screening for Abdominal Aortic Aneurysm if you have a family history of AAA  or have a history of smoking.  Shots:     Get a flu shot each year.     Get a tetanus shot every 10 years.     Talk to your doctor about your pneumonia vaccines. There are now two you should receive - Pneumovax (PPSV 23) and Prevnar (PCV 13).    Talk to your pharmacist about a shingles vaccine.     Talk to your doctor about the hepatitis B vaccine.  Nutrition:     Eat at least 5 servings of fruits and vegetables each day.     Eat whole-grain bread, whole-wheat pasta and brown rice instead of white grains and rice.     Get adequate Calcium and Vitamin D.   Lifestyle    Exercise for at least 150 minutes a week (30 minutes a day, 5 days a week). This will help you control your weight and prevent disease.     Limit alcohol to one drink per day.     No smoking.     Wear sunscreen to prevent skin cancer.     See your dentist every six months for an exam and cleaning.     See your eye doctor every 1 to 2 years to screen for conditions such as glaucoma, macular degeneration and cataracts.    Personalized Prevention Plan  You are due for the preventive services outlined below.  Your care team is available to assist you in scheduling these services.  If you have already completed any of these items, please share that information with your care team to update in your medical record.    Health Maintenance Due   Topic Date Due     Eye Exam - yearly  10/20/1950     Zoster (Chicken Pox) Vaccine (1 of 2) 10/20/2001     AORTIC ANEURYSM SCREENING (SYSTEM ASSIGNED)  10/20/2016     Discuss Advance Directive Planning  05/03/2018     FALL RISK ASSESSMENT  07/13/2018     Depression Assessment 2 - yearly  07/13/2018     Flu Vaccine (1) 09/01/2018     A1C (Diabetes) Lab - every 6 months  12/14/2018     Preventive Health Recommendations:     See your health care provider every year to    Review health changes.     Discuss preventive care.      Review your medicines if your doctor has prescribed any.    Talk with your health care provider  about whether you should have a test to screen for prostate cancer (PSA).    Every 3 years, have a diabetes test (fasting glucose). If you are at risk for diabetes, you should have this test more often.    Every 5 years, have a cholesterol test. Have this test more often if you are at risk for high cholesterol or heart disease.     Every 10 years, have a colonoscopy. Or, have a yearly FIT test (stool test). These exams will check for colon cancer.    Talk to with your health care provider about screening for Abdominal Aortic Aneurysm if you have a family history of AAA or have a history of smoking.  Shots:     Get a flu shot each year.     Get a tetanus shot every 10 years.     Talk to your doctor about your pneumonia vaccines. There are now two you should receive - Pneumovax (PPSV 23) and Prevnar (PCV 13).    Talk to your pharmacist about a shingles vaccine.     Talk to your doctor about the hepatitis B vaccine.  Nutrition:     Eat at least 5 servings of fruits and vegetables each day.     Eat whole-grain bread, whole-wheat pasta and brown rice instead of white grains and rice.     Get adequate Calcium and Vitamin D.   Lifestyle    Exercise for at least 150 minutes a week (30 minutes a day, 5 days a week). This will help you control your weight and prevent disease.     Limit alcohol to one drink per day.     No smoking.     Wear sunscreen to prevent skin cancer.     See your dentist every six months for an exam and cleaning.     See your eye doctor every 1 to 2 years to screen for conditions such as glaucoma, macular degeneration and cataracts.    Personalized Prevention Plan  You are due for the preventive services outlined below.  Your care team is available to assist you in scheduling these services.  If you have already completed any of these items, please share that information with your care team to update in your medical record.    Health Maintenance Due   Topic Date Due     Eye Exam - yearly  10/20/1950      Zoster (Chicken Pox) Vaccine (1 of 2) 10/20/2001     AORTIC ANEURYSM SCREENING (SYSTEM ASSIGNED)  10/20/2016     Discuss Advance Directive Planning  05/03/2018     FALL RISK ASSESSMENT  07/13/2018     Depression Assessment 2 - yearly  07/13/2018     Flu Vaccine (1) 09/01/2018     A1C (Diabetes) Lab - every 6 months  12/14/2018

## 2025-05-21 NOTE — PROGRESS NOTES
Dx:  Decreased functional mobility and endurance (Z74.09)   Date of surgery: 3/14/25  Date of Evaluation: 3/24/25  Referring provider: Tomás Ness # of visit: ALEJANDRO PPO; no visit limit, no auth POC visits: 16 visits  Next MD visits: 6/25/25  Fall Risk: standard Precautions: Cancer    Date: 5/21/25  Tx #: 16    Current Pain Level: not rated today      Subjective: Patient reports that the area to the left of her belly button was very painful yesterday, stating it is still painful this morning but not as severe as yesterday.  Describes the pain as a deep burning to the left of the belly button and then \"pulling\" further laterally from this point. Patient states the medical team is reviewing recent imaging today and following regarding plan of care.      Objective: Treatment per flow sheet.      Assessment: Initial reassessment of abdominal pain complaints today reveal visible area of swelling to the left of the umbilicus with palpable borders coinciding with area of tenderness.  Area of pain just inferior to the sternum/ribs to the right of the incision is palpable and has a more rigid feel, similar to scar tissue. Treatment today was limited so as not to increase irritation/inflammation of these areas.  Discussion regarding possibly putting PT on hold for a few sessions depending up the MD plan of care once recent imaging is reviewed and discussed.  Patient was instructed to take some relative rest the next few days from her home exercise program, only performing activities that are pain free, to avoid any additional irritation.      Plan: Reassess next session for progress note/extension of plan of care as needed. - deferred for next session rather than today as patient is waiting to hear back from medical team regarding recent imaging and plan of care with respect to abdominal pain.  This may effect plan of care for physical therapy.      Treatment Flow:  Brief reassessment with respect to c/o increased  abdominal pain to L of umbilicus  Therex:  Shuttle DLP 4B x30  Shuttle SLP 2B 1Y 2x30 each R/L  A/P tilt board tap x30  A/P tilt board stabilization x1'  DC vector lunge (trial of 5 reps but this reproduces abdominal pain so was discontinued)      Charges: 2 NIKKI  Total Timed Treatment: 30 minutes   Total Treatment Time: 3minutes

## 2025-05-22 ENCOUNTER — HOSPITAL ENCOUNTER (OUTPATIENT)
Dept: ULTRASOUND IMAGING | Facility: HOSPITAL | Age: 50
Discharge: HOME | End: 2025-05-22
Payer: COMMERCIAL

## 2025-05-22 ENCOUNTER — OFFICE VISIT (OUTPATIENT)
Dept: SURGERY | Facility: CLINIC | Age: 50
End: 2025-05-22
Payer: COMMERCIAL

## 2025-05-22 VITALS
TEMPERATURE: 98 F | OXYGEN SATURATION: 100 % | HEART RATE: 65 BPM | SYSTOLIC BLOOD PRESSURE: 126 MMHG | DIASTOLIC BLOOD PRESSURE: 81 MMHG | BODY MASS INDEX: 26 KG/M2 | WEIGHT: 158 LBS

## 2025-05-22 DIAGNOSIS — R19.03 ABDOMINAL WALL MASS OF RIGHT LOWER QUADRANT: ICD-10-CM

## 2025-05-22 DIAGNOSIS — C49.A0 GASTROINTESTINAL STROMAL TUMOR (GIST) (HCC): Primary | ICD-10-CM

## 2025-05-22 DIAGNOSIS — C49.A0 GASTROINTESTINAL STROMAL TUMOR (GIST) (HCC): ICD-10-CM

## 2025-05-22 PROCEDURE — 99024 POSTOP FOLLOW-UP VISIT: CPT

## 2025-05-22 PROCEDURE — 76705 ECHO EXAM OF ABDOMEN: CPT

## 2025-05-22 PROCEDURE — 3074F SYST BP LT 130 MM HG: CPT

## 2025-05-22 PROCEDURE — 3079F DIAST BP 80-89 MM HG: CPT

## 2025-05-22 NOTE — PROGRESS NOTES
[facility] Surgical Oncology      Patient Name:  Mark Stearns   YOB: 1975   Gender:  Female   Appt Date:  5/22/2025   Provider:  MAXWELL Rose     PATIENT PROVIDERS  Referring Provider: Jakub Reddy MD    Primary Care Provider:Prachi Arenas MD   Address: [address] [place] [address] [address] Megan [place] [id number]   Phone #: [phone]       CHIEF COMPLAINT  Chief Complaint   Patient presents with    Post-Op        PROBLEMS  Reviewed Problem List[1]     History of Present Illness:  Metastatic GIST     Oncology history:  2002 - incidentally found small bowel GIST (age 27) when undergoing a left nephrectomy for RCC; evaluated at [facility] by Dr. Wagner and given 1 yr of adjuvant imatinib followed by serial imaging    7/31/20 - CT w/ subtle recurrent intraperitoneal mass lesion, including 5.6 x 4.0 x 5.1 cm L central mesenteric mass w/ central necrosis c/f recurrent GIST    8/20/20 - resection at [facility] (Kristy) of ~20 peritoneal masses, 1 adherent to sigmoid colon; 4 main tumors (largest ~7 cm, smallest ~2 cm), and 2 cm tumor inside of small bowel mesentery, no tumors ruptured, 16 very tiny tumors (mostly on surface of small intestine); NSG w/ exon 11 KIT [id number]; path w/ GIST, spindle cell type, low grade (mitotic rate 0/5 mm2); peritoneal masses were 6.8 cm and 4.2 cm; mesenteric nodule was 2.8 cm; assorted peritoneal nodules were < 0.7 cm; R pelvic mass was 3.3 cm    10/2/20 - started on imatinib by Yesica Jorge at [facility]  11/11/20 - genetic counseling, germline testing negative    11/27/20 - CT w/ single residual mass LUQ lesion, slightly decreased; one additional very small possible nodule adjacent to duodenal jejunal junction; mass either in posterior peritoneum or RP in LUQ at caudal margin of spleen, 3.0 x 2.3 cm    12/4/20 - only on brand name Gleevec x1 mo w/ tingling on scalp, periorbital edema, and facial tingling; MR brain negative; switched to generic imatinib w/  significant improvement in sx; takes imatinib w/ Compazine to help nausea    2/22/21 - CT w/ mild decrease of LUQ mass adjacent to distal duodenum, likely residual GIST; no e/o mets; mild dcrease in mass in posterior peritoneum vs RP, 2.8 x 1.6 cm, less conspicuous adjacent nodularity    6/21/21 - CT w/ LUQ mass no significant change; no new lesions; nodularity adjacent to 3rd/4th portion of duodenum similar, 9 x 7 mm    9/24/21 - CT w/ increased nodules from proximal descending colon and 4th portin of duodenum c/w enlarging GIST tumors    11/5/21 - CT w/ increased nodules size abutting 4th portion of duodenum/proximal descending colon; stable 0.8 cm nodule in medial R abdominis rectus muscle c/f tumor; 3.2 x 2.4 cm nodule inferior to spleen/proximal descending colon; 1.8 x 1.3 cm nodule anterior to 3rd/4th portion of duodenum; 0.8 x 0.6 cm nodule in medial aspect of R abdominis muscle    11/20/21 - increased imatinib to 400 mg BID  1/3/22 - slightly increased L-sided abdominal pain  1/25/22 - CT stable  4/29/22 CT AP: stable  8/1/22 CT AP: stable  12/6/22 CT AP  No significant change in heterogeneous mass in the left retroperitoneum and small soft  tissue nodule in the right rectus abdominis muscle. No CT evidence of new metastatic disease.  4/17/23 CT AP: stable exam  12/5/23 CT AP stable exam  4/23/24 CT AP  Increase in size of mass in the left renal fossa   Guardant KIT [id number], exon 11  6/3/24 day 1 cycle 1 sunatenib   11/13/2025: Interval decrease in size of the left perisplenic/renal fossa mass and lesion within the right rectus abdominis muscle.   2/11/2025: Continued decrease in size  3/14/2025: Resection recurrent GIST tumor with en-bloc partial resection of left diaphragm, partial pancreatectomy, and splenectomy. Cytoreduction multiple jejunal nodules. Left chest tube placement with complex repair left diaphragm. Intraoperative ultrasound. Lysis of adhesions. --> Pathology GIST tumor, lymph nodes  negative.     Interval History:  She has been having persistent periumbilical pain. PCP ordered CT A/P which showed interval decrease in size of pancreatic pseudocyst, along with possible fat necrosis along the anterior abdominal wall/midline. Stable hyperdense lesion in the right rectus abdominis muscle.   She notes a burn and tugging along incision site at belly button area bilaterally. She denies any inciting events that could have worsened the pain. She notes upper abdominal pain is persistent. The umbilical pain has been persistent since Tuesday but initially was intermittent. She has been taking tylenol as needed. She has been doing packs with slight relief.      Vital Signs:  /81 (BP Location: Left arm, Patient Position: Sitting)   Pulse 65   Temp 98.1 °F (36.7 °C)   Wt 71.7 kg (158 lb)   LMP 03/01/2025 (Exact Date)   SpO2 100%   BMI 25.50 kg/m²      Medications Reviewed:  Medications - Current[2]     Allergies Reviewed:  Allergies[3]     History:  Reviewed:  Past Medical History[4]   Reviewed:  Past Surgical History[5]   Reviewed Social History:  Short Social Hx on File[6]   Reviewed:  Family History[7]   Review of Systems:  Doing well post-operatively  Denies fever or chills  Denies chest pain or SOB  + abdominal pain and nausea. No vomiting.   Denies dysuria or hematuria  Denies warmth, erythema, or drainage at incision       Physical Examination:  Constitutional:  NAD.   Eyes: non-icteric.    Abdomen: Soft, non-tender, non-distended. Incision healing well without drainage or erythema. Periumbilical tenderness to light touch. No peritonitis.   Musculoskeletal: no edema.     Document Review:  IMPRESSION:   1. Interval decrease in size of the hypodense collection adjacent to the tail of the pancreas   2. No significant change in the hyperdense lesion involving the right rectus abdominis muscle. The   possibility of an enhancing lesion including metastatic disease is not excluded. Consider further    evaluation with MRI or PET/CT   3. A 1 cm hyperdense nodule along the posterior margin of the uterus. This could also be further   evaluated with ultrasound or MRI of the pelvis   4. Interval increase in soft tissue wall thickening surrounding circumscribed areas of fat in the   anterior abdominal wall near the midline. Possibility of fat necrosis or other inflammatory process   could be considered. Clinical correlation and follow-up is recommended.      Procedure(s):  The site  (periumbilical swelling)  was prepped and drapped in the usual sterile fashion. 1% lidocaine was used as local anesthetic. Patient experienced immediate relief of pain symptoms. Dressings were applied. Instructions were discussed. Patient verbalized understanding.      Assessment / Plan:  Gastrointestinal stromal tumor (GIST) (HCC)   - No acute surgical issues  - Imaging reviewed at our group tumor board meeting. Recommendations were for US of right rectus abdominis lesion to evaluate for vascularity as AVM is on differential.   - Suspect periumbilical swelling and pain is neuropathic vs fat necrosis as suspected on CT. Advised to trial lidocaine patches and ibuprofen instead of tylenol.   - Proceed with US later today  - Reviewed signs and symptoms to return to clinic or call the office      Follow Up:  Pending US       Electronically Signed by: MAXWELL Rose           [1]   Patient Active Problem List  Diagnosis    Gastrointestinal stromal tumor (GIST) (HCC)    GERD (gastroesophageal reflux disease)    H/O right nephrectomy    Seasonal allergies    Secondary malignancy of parietal peritoneum (HCC)    Diarrhea    GIST (gastrointestinal stromal tumor), malignant (HCC)    Malignant neoplasm metastatic to liver (HCC)    Primary hypertension    Renal cell carcinoma (HCC)    Metastatic adenocarcinoma (HCC)    Colon cancer screening    GIST, malignant (HCC)    Postprocedural pneumothorax    Nausea and vomiting    Pancreatic pseudocyst  (Formerly [facility])   [2]   Current Outpatient Medications:     oxyCODONE HCl 10 MG Oral Tablet Abuse-Deterrent, Take 1 tablet by mouth every 6 (six) hours as needed (pain)., Disp: 15 tablet, Rfl: 0    metoclopramide 10 MG Oral Tab, Take 1 tablet (10 mg total) by mouth 3 (three) times daily as needed., Disp: 20 tablet, Rfl: 0    oxyCODONE 10 MG Oral Tab, Take 1 tablet (10 mg total) by mouth every 4 (four) hours as needed for Pain., Disp: 30 tablet, Rfl: 0    docusate sodium 100 MG Oral Cap, Take 100 mg by mouth 2 (two) times daily., Disp: 60 capsule, Rfl: 0    Omeprazole 40 MG Oral Capsule Delayed Release, Take 1 capsule (40 mg total) by mouth daily., Disp: , Rfl:     oxyCODONE 5 MG Oral Tab, Take 1 tablet (5 mg total) by mouth every 6 (six) hours as needed., Disp: 15 tablet, Rfl: 0    enoxaparin 40 MG/0.4ML Injection Solution Prefilled Syringe, Inject 0.4 mL (40 mg total) into the skin daily., Disp: 24 each, Rfl: 0    ondansetron (ZOFRAN) 4 mg tablet, Take 1 tablet (4 mg total) by mouth every 6 (six) hours as needed., Disp: 12 tablet, Rfl: 0    Naloxone HCl 4 MG/0.1ML Nasal Liquid, 4 mg by Nasal route as needed. If patient remains unresponsive, repeat dose in other nostril 2-5 minutes after first dose., Disp: 1 kit, Rfl: 0    amLODIPine 5 MG Oral Tab, Take 1 tablet (5 mg total) by mouth daily., Disp: , Rfl:   [3]   Allergies  Allergen Reactions    Codeine ITCHING, SHORTNESS OF BREATH, SWELLING and TONGUE SWELLING    Opioid Analgesics ITCHING, OTHER (SEE COMMENTS) and SHORTNESS OF BREATH    Penicillins RASH, SHORTNESS OF BREATH, SWELLING and TONGUE SWELLING   [4]   Past Medical History:   Abdominal hernia    Abdominal pain    Arthritis    Bloating    Decorative tattoo    Esophageal reflux    Fatigue    Flatulence/gas pain/belching    Gastrointestinal stromal tumor (GIST) (Formerly [facility])    followed at [facility], on chemo    Headache disorder    Heartburn    High blood pressure    Hx of motion sickness    IBS (irritable bowel syndrome)     Indigestion    Leaking of urine    Pain with bowel movements    Personal history of antineoplastic chemotherapy    LAST ORAL TREATMENT    PONV (postoperative nausea and vomiting)    Renal cell carcinoma, left (HCC)    Renal disorder    RIGHT KIDNEY ONLY, LEFT KIDNEY REMOVED 2003    Stool incontinence    Stress    Uncomfortable fullness after meals    Visual impairment    GLASSES    Wears glasses    Weight loss   [5]   Past Surgical History:  Procedure Laterality Date    Bowel resection  08/20/2020    25 tumors resected from small bowel    Colonoscopy N/A 03/11/2025    Procedure: COLONOSCOPY with biopsies;  Surgeon: Keyshawn Marshall MD;  Location: [facility] ENDOSCOPY    Nephrectomy Left 11/2002    partial small bowel resection    Other surgical history  03/14/2025    Resection of recurrent/metastatic gastrointestinal stromal tumor with en bloc partial resection of left diaphragm, partial distal pancreatectomy, and splenectomy.   2.       Cytoreduction of multiple jejunal nodules.   3.       Complex repair of left diaphragm.   4.       Left chest tube placement. 5.       Intraoperative ultrasound. 6.       Lysis of adhesions.   [6]   Social History  Socioeconomic History    Marital status:    Tobacco Use    Smoking status: Never    Smokeless tobacco: Never   Vaping Use    Vaping status: Never Used   Substance and Sexual Activity    Alcohol use: Yes     Comment: 1X/MONTH    Drug use: Never   Social History Narrative    , 2 children.  Worked as  at [employer]. 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  Walking for exercise.      Social Drivers of Health     Food Insecurity: No Food Insecurity (3/14/2025)    NCSS - Food Insecurity     Worried About Running Out of Food in the Last Year: No     Ran Out of Food in the Last Year: No   Transportation Needs: No Transportation Needs (3/14/2025)    NCSS - Transportation     Lack of Transportation: No   Housing Stability: Not At Risk (3/14/2025)    NCSS -  Housing/Utilities     Has Housing: Yes     Worried About Losing Housing: No     Unable to Get Utilities: No   [7]   Family History  Problem Relation Age of Onset    Diabetes Mother     Thyroid Disorder Sister     Cancer Son 1        [place]

## 2025-05-23 DIAGNOSIS — N39.3 STRESS INCONTINENCE OF URINE: Primary | ICD-10-CM

## 2025-05-23 DIAGNOSIS — R19.03 RIGHT LOWER QUADRANT ABDOMINAL MASS: Primary | ICD-10-CM

## 2025-05-23 DIAGNOSIS — C49.A0 GASTROINTESTINAL STROMAL TUMOR (GIST) (HCC): ICD-10-CM

## 2025-05-27 ENCOUNTER — OFFICE VISIT (OUTPATIENT)
Dept: PHYSICAL THERAPY | Facility: HOSPITAL | Age: 50
End: 2025-05-27
Attending: SURGERY
Payer: COMMERCIAL

## 2025-05-27 PROCEDURE — 97140 MANUAL THERAPY 1/> REGIONS: CPT

## 2025-05-27 PROCEDURE — 97110 THERAPEUTIC EXERCISES: CPT

## 2025-05-27 NOTE — PROGRESS NOTES
Dx:  Decreased functional mobility and endurance (Z74.09)   Date of surgery: 3/14/25  Date of Evaluation: 3/24/25  Referring provider: Tomás Ness # of visit: ALEJANDRO PPO; no visit limit, no auth POC visits: 16 visits  Next MD visits: 6/25/25  Fall Risk: standard Precautions: Cancer    Date: 5/27/25  Tx #: 17 ->progress note next session to extend plan of care    Current Pain Level: not rated today      Subjective: Patient reports she continues to have pain and swelling predominately to the left of the belly button.  States it doesn't look as noticeably swollen but the area of tenderness seems a little more diffuse today than it was last week.  Patient is concerned as she has yet to get a definitive answer on what the cause of this is.       Objective: Treatment per flow sheet.      Assessment: Encouraged patient to communicate with her care team regarding continued concerns about the pain and swelling to the left of the belly button. She is agreeable to this. Patient tolerating addition of light scar mobilization/myofascial mobilization today along the upper aspect of the abdominal incision as this was cleared by the doctor's office with PA noting they believe this is an old hematoma. Patient tolerating therex well but does note onset of pain in the abdominal region by the end of the PT session, noting it feel \"like a deep bruise\".    Plan: Reassess next session for progress note/extension of plan of care as needed      Treatment Flow:  Manual: (10 min)  STM/myofascial release to upper aspect of abdominal incision (pt edu in this as well)  Therex: (30 min)  Bike x5'  Bosu retro lunge with \"superman\", x15 each R/L   Power slide lateral slides x2'  Power slide retro lunge x15 each R/L      Charges: Man, 2 NIKKI  Total Timed Treatment: 40 minutes   Total Treatment Time: 40 minutes

## 2025-05-29 ENCOUNTER — OFFICE VISIT (OUTPATIENT)
Dept: PHYSICAL THERAPY | Facility: HOSPITAL | Age: 50
End: 2025-05-29
Attending: SURGERY
Payer: COMMERCIAL

## 2025-05-29 PROCEDURE — 97140 MANUAL THERAPY 1/> REGIONS: CPT

## 2025-05-29 PROCEDURE — 97110 THERAPEUTIC EXERCISES: CPT

## 2025-05-29 NOTE — PROGRESS NOTES
Dx:  Decreased functional mobility and endurance (Z74.09)   Date of surgery: 3/14/25  Date of Evaluation: 3/24/25  Referring provider: Tomás Ness # of visit: ALEJANDRO PPO; no visit limit, no auth POC visits: 16 visits  Next MD visits: 6/25/25  Fall Risk: standard Precautions: Cancer    PROGRESS NOTE:    Date: 5/29/25  Tx #: 18     Current Pain Level: not rated today      Subjective: Patient reports she has been working very hard on her home exercise program as well as performing walking.  States she feels she has made significant progress with participation in physical therapy, stating her general mobility is significantly better (able to perform transitional movements such as getting in/out of car, in/out of bed, etc without difficulty).  She also notes her tolerance to activities including prolonged walking is much better.  She expresses concern as she continues to have pain and swelling surrounding the belly button, stating she gets pain with activities which stress the core/abdominal musculature and describes this as a \"deep\" pain, almost like a deep bruise as well as \"pulling\".  She also continues to get occasional pain or pulling proximally along the incision with certain movements but states this is generally much less intense and doesn't linger the abdominal pain around the belly button can.    Objective: Treatment per flow sheet.  Functional mobility: independent without UE support for sit<>stand, bed mobility, etc  Strength/MMT:   5/5 throughout hip/LE musculature  Shoulder/UE:   -Shoulder flexion: 5/5 B   -Shoulder abduction: 4+/5 B   -Shoulder ER: 4+/5 B   -Shoulder IR: 4/5 B   -Biceps: 4-/5 B   -Triceps: 4/5 B   -Mid trap: 4+/5 B   -Low trap: 3+/5 B  Balance: SLB >30 sec each R/L on flat, solid surface      Assessment: The patient has made excellent progress with participation in PT, demonstrating significantly improved functional mobility and continuing to make strength gains for UE, LE, and  core strength.  Patient is also demonstrating improved balance/dynamic stabilization with exercises for greater safety with ADL.  The patient continues to have some weakness throughout the Ues and parascapular musculature as well as some weakness and pain with core.  Patient will benefit from continuation of physical therapy to address these remaining impairments to promote return to ADL and recreational activities as prior without pain or modification.    Goals: (to be met in 16 visits)   Patient demonstrating ability to perform sit<>supine transfer independently using log roll technique for greater independence with transitional movements MET  Patient demonstrating ability to perform sit<>stand transfer independently without UE support for greater functional strength and independence with ADL MET  Patient demonstrating hip/LE strength 5/5 to promote greater ease with ADL such as prolonged walking and transitional movements MET  Patient demonstrating normalized, independent gait with increased endurance, tolerating >20 minutes of walking MET  Patient demonstrating improved core strength >=2/5 for Sahrmann low ab testing with ability to perform proper abdominal bracing without requiring verbal/tactile cuing for improved core stabilization with ADL MET  Patient demonstrating improved functional strength, reporting ability to perform ADL such as loading/unloading the  and light lifting activities without pain or difficutly. MET  Additional goals added:  Patient demonstrating 5/5 strength throughout the shoulder/UE musculature for greater ease with lifting/carrying type activities  Improved mid/low trap strength >=4+/5 to promote improved upright posturing and greater scapular stabilization with functional activities such as lifting and reaching  Improved core strength >=3/5 without presence of pain during resisted contraction/loading of the core     Plan: Continue with PT 1-2x/week for an additional 10  visits to address these remaining impairments to promote return to ADL and recreational activities as prior without pain or modification.up      Treatment Flow:  Formal reassessment  Manual: (10 min)  STM/myofascial release to upper aspect of abdominal incision (pt edu in this as well)  Therex: (30 min)  6\" lateral heel tap x20 each R/L  Bosu single leg squat 15 each R/L  TRX inverted row x20  SLB each R/L Body blade: 2x30\" each   -up/down   -side-to side         Charges: Man, 2 NIKKI  Total Timed Treatment: 40 minutes   Total Treatment Time: 40 minutes

## 2025-05-29 NOTE — TELEPHONE ENCOUNTER
Called patient to advise forms completed and faxed to [facility] as one time courtesy - if needs additional forms completed in the future we will need her to complete questionnaire - advised patient forms were uploaded to her [employer] and I did also send Op report to [facility]- verbal understanding from patient - patient will complete authorization for any future forms needed due to upcoming procedures

## 2025-06-03 ENCOUNTER — APPOINTMENT (OUTPATIENT)
Dept: PHYSICAL THERAPY | Facility: HOSPITAL | Age: 50
End: 2025-06-03
Attending: SURGERY
Payer: COMMERCIAL

## 2025-06-03 RX ORDER — MULTIVIT-MIN/IRON FUM/FOLIC AC 7.5 MG-4
1 TABLET ORAL DAILY
COMMUNITY

## 2025-06-05 ENCOUNTER — OFFICE VISIT (OUTPATIENT)
Dept: PHYSICAL THERAPY | Facility: HOSPITAL | Age: 50
End: 2025-06-05
Attending: SURGERY
Payer: COMMERCIAL

## 2025-06-05 PROCEDURE — 97140 MANUAL THERAPY 1/> REGIONS: CPT

## 2025-06-05 PROCEDURE — 97110 THERAPEUTIC EXERCISES: CPT

## 2025-06-05 NOTE — PROGRESS NOTES
Dx:  Decreased functional mobility and endurance (Z74.09)   Date of surgery: 3/14/25  Date of Evaluation: 3/24/25  Referring provider: Tomás Be   Authorized # of visit: ALEJANDRO PPO; no visit limit, no auth POC visits: 16 visits  Next MD visits: 6/25/25  Fall Risk: standard Precautions: Cancer      Date: 6/5/25  Tx #: 19     Current Pain Level: not rated today      Subjective: Patient reports she hasn't been having as much discomfort or \"pulling\" along the upper part of her incision.  Continues to note pain/tenderness around the belly button but notes this hasn't been as severe but she also took about a 4 day rest from exercises due to a family emergency and moving her child.    Objective: Treatment per flow sheet.    Assessment: Patient demonstrating improved soft tissue/myofascial mobility throughout the upper to mid abdominal incision today and noting decreased tenderness with this - still some mild restrictions noted but significantly improved from when we started manual scar mobilization.  Patient tolerating therex well without any c/o abdominal straining.  Notes that  strength is challenging with TRX inverted row today.    Plan: Continue per plan of care. Consider adding fine motor/ strengthening to HEP next session.      Treatment Flow:  Manual: (12 min)  STM/myofascial release to upper aspect of abdominal incision (pt edu in this as well)  Therex: (30 min)  Bosu power stance 3# UE flxn 2x10  Bosu SLS 4# D2 flxn x10 each R//L and 3# x10   Foot slider lateral-retro lunge swing arounds x20 each R/L  15# DC fly into fwd press in split stance x15 each R foot fwd/L foot fwd  TRX inverted row 2x10        Charges: Man, 2 NIKKI  Total Timed Treatment: 42 minutes   Total Treatment Time: 42 minutes

## 2025-06-10 ENCOUNTER — OFFICE VISIT (OUTPATIENT)
Dept: PHYSICAL THERAPY | Facility: HOSPITAL | Age: 50
End: 2025-06-10
Attending: SURGERY
Payer: COMMERCIAL

## 2025-06-10 PROCEDURE — 97110 THERAPEUTIC EXERCISES: CPT

## 2025-06-10 PROCEDURE — 97140 MANUAL THERAPY 1/> REGIONS: CPT

## 2025-06-10 NOTE — PROGRESS NOTES
Dx:  Decreased functional mobility and endurance (Z74.09)   Date of surgery: 3/14/25  Date of Evaluation: 3/24/25  Referring provider: Tomás Ness # of visit: ALEJANDRO PPO; no visit limit, no auth POC visits: 16 visits  Next MD visits: 6/25/25  Fall Risk: standard Precautions: Cancer      Date: 6/10/25  Tx #: 20     Current Pain Level: not rated today      Subjective: Patient reports her incisional \"tightness\" higher up seems to be diminishing.  Still experiencing some tenderness around the belly button but this is a little better as well.     Objective: Treatment per flow sheet.    Assessment: Incorporated foam roll/Bosu pectoral stretching today with patient reporting this feels good and does provide light stretch to her abdomen as well - incorporated this into HEP.  Patient noting onset of numbness in the 4th and 5th digit today with plyoball on wall activities which resolves once of the overhead flexion or abduction (90/90) position.      Plan: Continue to advance functional strengthening of core, upper body/postural muscles, hips/LE, as well as balance training. Add fine motor/ strengthening to HEP next session.      Treatment Flow:  Manual: (10 min)  STM/myofascial release to upper aspect of abdominal incision   Therex: (30 min)  Body blade up/down through AROM flxn/ext 2x30\" each R/L  1# plyoball on wall OH dribble [facility] wiper 2x30\" each R/L  1# plyoball 90/90 ER \"dribble\" 2x30\" each R/L  15# DC ULISSES x10 each R/L  Foam roll \"T\" pectoral stretch x1'  Bosu pectoral \"T\" stretch\" x1'      Charges: Man, 2 NIKKI  Total Timed Treatment: 40 minutes   Total Treatment Time: 40 minutes

## 2025-06-12 ENCOUNTER — OFFICE VISIT (OUTPATIENT)
Dept: PHYSICAL THERAPY | Facility: HOSPITAL | Age: 50
End: 2025-06-12
Attending: SURGERY
Payer: COMMERCIAL

## 2025-06-12 PROCEDURE — 97110 THERAPEUTIC EXERCISES: CPT

## 2025-06-12 PROCEDURE — 97140 MANUAL THERAPY 1/> REGIONS: CPT

## 2025-06-12 NOTE — PROGRESS NOTES
Dx:  Decreased functional mobility and endurance (Z74.09)   Date of surgery: 3/14/25  Date of Evaluation: 3/24/25  Referring provider: Tomás Ness # of visit: ALEJANDRO PPO; no visit limit, no auth POC visits: 16 visits extended 5/29/25)  Next MD visits: 6/25/25  Fall Risk: standard Precautions: Cancer      Date: 6/12/25  Tx #: 21     Current Pain Level: not rated today      Subjective: Patient reports upper incisional tightness has been doing much better.  Notes the tenderness/soreness in the umbilical region is much more isolated to the left of the belly button now.    Objective: Treatment per flow sheet.    Assessment: Incorporated resisted UE PNF patterns today, incorporating these into HEP.  Patent requiring minimal initial cuing initially with D2 flexion to perform proper scapular setting and avoid upper trap bias.  Also incorporated various hand//pinch strengthening exercises to HEP using foam blocks/putty or rubber band.    Plan: Continue per plan of care.        Treatment Flow:  Manual: (12 min)  STM/myofascial release to upper aspect of abdominal incision   Therex: (30 min)  Pt edu in HEP for hand, , and pinch strengthening  Shuttle seated fwd chess press 1G 1Y x20  RTB D2 flexion x12 each R/L  RTB D1 flexion x20 each R/L  DC no pin D2 extension x20 each R/L  DC no pin D1 extension x20 each R/L      Charges: Man, 2 NIKKI  Total Timed Treatment: 42 minutes   Total Treatment Time: 42 minutes

## 2025-06-17 ENCOUNTER — OFFICE VISIT (OUTPATIENT)
Dept: PHYSICAL THERAPY | Facility: HOSPITAL | Age: 50
End: 2025-06-17
Attending: SURGERY
Payer: COMMERCIAL

## 2025-06-17 PROCEDURE — 97140 MANUAL THERAPY 1/> REGIONS: CPT

## 2025-06-17 PROCEDURE — 97110 THERAPEUTIC EXERCISES: CPT

## 2025-06-17 NOTE — PROGRESS NOTES
[facility] Surgical Oncology      Patient Name:  Mark Stearns   YOB: 1975   Gender:  Female   Appt Date:  6/25/2025   Provider:  Tomás Be MD     PATIENT PROVIDERS  Referring Provider: Jakub Reddy MD    Primary Care Provider:Prachi Arenas MD   Address: [address] [place] [address] [address] Megan [address]S [address]   Phone #: [phone]       CHIEF COMPLAINT  F/U GIST     PROBLEMS  Reviewed Problem List[1]     History of Present Illness:  Metastatic GIST     Oncology history:  2002 - incidentally found small bowel GIST (age 27) when undergoing a left nephrectomy for RCC; evaluated at [facility] by Dr. Wagner and given 1 yr of adjuvant imatinib followed by serial imaging    7/31/20 - CT w/ subtle recurrent intraperitoneal mass lesion, including 5.6 x 4.0 x 5.1 cm L central mesenteric mass w/ central necrosis c/f recurrent GIST    8/20/20 - resection at [facility] (Kristy) of ~20 peritoneal masses, 1 adherent to sigmoid colon; 4 main tumors (largest ~7 cm, smallest ~2 cm), and 2 cm tumor inside of small bowel mesentery, no tumors ruptured, 16 very tiny tumors (mostly on surface of small intestine); NSG w/ exon 11 KIT [id number]; path w/ GIST, spindle cell type, low grade (mitotic rate 0/5 mm2); peritoneal masses were 6.8 cm and 4.2 cm; mesenteric nodule was 2.8 cm; assorted peritoneal nodules were < 0.7 cm; R pelvic mass was 3.3 cm    10/2/20 - started on imatinib by Yesica Jorge at [facility]  11/11/20 - genetic counseling, germline testing negative    11/27/20 - CT w/ single residual mass LUQ lesion, slightly decreased; one additional very small possible nodule adjacent to duodenal jejunal junction; mass either in posterior peritoneum or RP in LUQ at caudal margin of spleen, 3.0 x 2.3 cm    12/4/20 - only on brand name Gleevec x1 mo w/ tingling on scalp, periorbital edema, and facial tingling; MR brain negative; switched to generic imatinib w/ significant improvement in sx; takes imatinib w/  Compazine to help nausea    2/22/21 - CT w/ mild decrease of LUQ mass adjacent to distal duodenum, likely residual GIST; no e/o mets; mild dcrease in mass in posterior peritoneum vs RP, 2.8 x 1.6 cm, less conspicuous adjacent nodularity    6/21/21 - CT w/ LUQ mass no significant change; no new lesions; nodularity adjacent to 3rd/4th portion of duodenum similar, 9 x 7 mm    9/24/21 - CT w/ increased nodules from proximal descending colon and 4th portin of duodenum c/w enlarging GIST tumors    11/5/21 - CT w/ increased nodules size abutting 4th portion of duodenum/proximal descending colon; stable 0.8 cm nodule in medial R abdominis rectus muscle c/f tumor; 3.2 x 2.4 cm nodule inferior to spleen/proximal descending colon; 1.8 x 1.3 cm nodule anterior to 3rd/4th portion of duodenum; 0.8 x 0.6 cm nodule in medial aspect of R abdominis muscle    11/20/21 - increased imatinib to 400 mg BID  1/3/22 - slightly increased L-sided abdominal pain  1/25/22 - CT stable  4/29/22 CT AP: stable  8/1/22 CT AP: stable  12/6/22 CT AP  No significant change in heterogeneous mass in the left retroperitoneum and small soft  tissue nodule in the right rectus abdominis muscle. No CT evidence of new metastatic disease.  4/17/23 CT AP: stable exam  12/5/23 CT AP stable exam  4/23/24 CT AP  Increase in size of mass in the left renal fossa   Guardant KIT [id number], exon 11  6/3/24 day 1 cycle 1 sunatenib   11/13/2025: Interval decrease in size of the left perisplenic/renal fossa mass and lesion within the right rectus abdominis muscle.   2/11/2025: Continued decrease in size  3/14/2025: Resection recurrent GIST tumor with en-bloc partial resection of left diaphragm, partial pancreatectomy, and splenectomy. Cytoreduction multiple jejunal nodules. Left chest tube placement with complex repair left diaphragm. Intraoperative ultrasound. Lysis of adhesions. --> Pathology GIST tumor, lymph nodes negative.   5/13/2025: CT A/P ordered for  periumbilical pain showed interval decrease in size of pancreatic pseudocyst, along with possible fat necrosis along the anterior abdominal wall/midline. Stable hyperdense lesion in the right rectus abdominis muscle.  6/18/2025: Underwent biopsy of hyperdense lesion in right rectus abdominis muscle --> GIST with spindle cell pattern  She is still having some pain near the umbilicus, she has been taking ibuprofen which helps.      Vital Signs:  /80 (BP Location: Right arm, Patient Position: Sitting, Cuff Size: adult)   Pulse 77   Temp 98.4 °F (36.9 °C) (Tympanic)   Resp 16   Wt 72.6 kg (160 lb)   LMP 05/01/2025 (Exact Date)   SpO2 98%   BMI 25.82 kg/m²      Medications Reviewed:  Medications - Current[2]     Allergies Reviewed:  Allergies[3]     History:  Reviewed:  Past Medical History[4]   Reviewed:  Past Surgical History[5]   Reviewed Social History:  Short Social Hx on File[6]   Reviewed:  Family History[7]   Review of Systems:  GENERAL HEALTH: fatigue.   SKIN: no change in mole.   RESPIRATORY: denies shortness of breath, wheezing or cough   CARDIOVASCULAR: denies chest pain, SOB, edema,orthopnea, no palpitations   GI: denies nausea, vomiting, constipation, diarrhea; no rectal bleeding  GENITAL/: no blood in urine  MUSCULOSKELETAL: no joint complaints, no back pain  NEURO: no tingling, numbness, weakness  ENDOCRINE: No abnormal weight loss or gain  PSYCH: no mood changes       Physical Examination:  Constitutional:  NAD.   Eyes: Sclera: non-icteric.   Lymph Nodes: Lymph Nodes no cervical LAD, supraclavicular LAD, axillary LAD, or inguinal LAD.  Abdomen: Soft.  Elyssa-incisional tenderness postoperatively.  Ecchymosis right lower from recent biopsy.  No masses.  Musculoskeletal: Extremities: no edema.  No back or flank tenderness.  Skin: Inspection and palpation: no jaundice.      Document Review:  CT A/P 5/13/2025:  IMPRESSION:   1. Interval decrease in size of the hypodense collection adjacent to  the tail of the pancreas   2. No significant change in the hyperdense lesion involving the right rectus abdominis muscle. The   possibility of an enhancing lesion including metastatic disease is not excluded. Consider further   evaluation with MRI or PET/CT   3. A 1 cm hyperdense nodule along the posterior margin of the uterus. This could also be further   evaluated with ultrasound or MRI of the pelvis   4. Interval increase in soft tissue wall thickening surrounding circumscribed areas of fat in the   anterior abdominal wall near the midline. Possibility of fat necrosis or other inflammatory process   could be considered. Clinical correlation and follow-up is recommended.           6/18/2025 Pathology:  Final Diagnosis:   Right rectus nodule, needle core biopsies:  - Portions of gastrointestinal stromal tumor (GIST), spindle cell pattern, in needle biopsies.  - No necrosis or mitotic figures seen in the core.  -See comment.            Procedure(s):  None     Assessment / Plan:  Gastrointestinal stromal tumor with peritoneal GISTosis  Status post cytoreduction without evidence for recurrent disease.  Abdominal wall recurrence  Stable.  Findings discussed with patient.  Case was reviewed today at our multidisciplinary tumor board.  In addition I discussed case with Dr. Reddy.  Recommendations for surveillance rendered which patient initially was hesitant about but later agreed for.  To this regard, will repeat imaging in August 2025.  Patient agreed and understood.  She had ample time to ask questions.      Follow Up:  8/2025       Electronically Signed by: Tomás Be MD           [1]   Patient Active Problem List  Diagnosis    Gastrointestinal stromal tumor (GIST) (HCC)    GERD (gastroesophageal reflux disease)    H/O right nephrectomy    Seasonal allergies    Secondary malignancy of parietal peritoneum (HCC)    Diarrhea    GIST (gastrointestinal stromal tumor), malignant (HCC)    Malignant neoplasm  metastatic to liver (HCC)    Primary hypertension    Renal cell carcinoma (HCC)    Metastatic adenocarcinoma (HCC)    Colon cancer screening    GIST, malignant (HCC)    Postprocedural pneumothorax    Nausea and vomiting    Pancreatic pseudocyst (HCC)    Right lower quadrant abdominal mass   [2]   Current Outpatient Medications:     amLODIPine 2.5 MG Oral Tab, Take 1 tablet (2.5 mg total) by mouth daily., Disp: , Rfl:     Multiple Vitamins-Minerals (MULTI-VITAMIN/MINERALS) Oral Tab, Take 1 tablet by mouth daily., Disp: , Rfl:   [3]   Allergies  Allergen Reactions    Codeine ITCHING, SHORTNESS OF BREATH, SWELLING and TONGUE SWELLING    Opioid Analgesics ITCHING, OTHER (SEE COMMENTS) and SHORTNESS OF BREATH    Penicillins RASH, SHORTNESS OF BREATH, SWELLING and TONGUE SWELLING   [4]   Past Medical History:   Abdominal hernia    Abdominal pain    Arthritis    Bloating    Decorative tattoo    Esophageal reflux    Fatigue    Flatulence/gas pain/belching    Gastrointestinal stromal tumor (GIST) (HCC)    followed at [facility], on chemo    Headache disorder    Heartburn    High blood pressure    6/3/2025 - due to chemotherapy    Hx of motion sickness    IBS (irritable bowel syndrome)    Indigestion    Leaking of urine    Pain with bowel movements    Personal history of antineoplastic chemotherapy    LAST ORAL TREATMENT    PONV (postoperative nausea and vomiting)    Renal cell carcinoma, left (HCC)    GIST    Renal disorder    RIGHT KIDNEY ONLY, LEFT KIDNEY REMOVED 2003    Stool incontinence    Stress    Uncomfortable fullness after meals    Visual impairment    GLASSES    Wears glasses    Weight loss   [5]   Past Surgical History:  Procedure Laterality Date    Bowel resection  08/20/2020    25 tumors resected from small bowel    Colonoscopy N/A 03/11/2025    Procedure: COLONOSCOPY with biopsies;  Surgeon: Keyshawn Marshall MD;  Location:  ENDOSCOPY    Nephrectomy Left 11/2002    partial small bowel resection    Other surgical  history  03/14/2025    Resection of recurrent/metastatic gastrointestinal stromal tumor with en bloc partial resection of left diaphragm, partial distal pancreatectomy, and splenectomy.   2.       Cytoreduction of multiple jejunal nodules.   3.       Complex repair of left diaphragm.   4.       Left chest tube placement. 5.       Intraoperative ultrasound. 6.       Lysis of adhesions.   [6]   Social History  Socioeconomic History    Marital status:    Tobacco Use    Smoking status: Never    Smokeless tobacco: Never   Vaping Use    Vaping status: Never Used   Substance and Sexual Activity    Alcohol use: Yes     Comment: 1X/MONTH    Drug use: Never   Social History Narrative    , 2 children.  Worked as  at [employer]. 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  Walking for exercise.      Social Drivers of Health     Food Insecurity: No Food Insecurity (3/14/2025)    NCSS - Food Insecurity     Worried About Running Out of Food in the Last Year: No     Ran Out of Food in the Last Year: No   Transportation Needs: No Transportation Needs (3/14/2025)    NCSS - Transportation     Lack of Transportation: No   Housing Stability: Not At Risk (3/14/2025)    NCSS - Housing/Utilities     Has Housing: Yes     Worried About Losing Housing: No     Unable to Get Utilities: No   [7]   Family History  Problem Relation Age of Onset    Diabetes Mother     Thyroid Disorder Sister     Cancer Son 1        [place]

## 2025-06-17 NOTE — PROGRESS NOTES
Dx:  Decreased functional mobility and endurance (Z74.09)   Date of surgery: 3/14/25  Date of Evaluation: 3/24/25  Referring provider: Tomás Ness # of visit: ALEJANDRO RECINOSO; no visit limit, no auth POC visits: 16 visits extended 5/29/25)  Next MD visits: 6/25/25  Fall Risk: standard Precautions: Cancer      Date: 6/17/25  Tx #: 22    Current Pain Level: not rated today      Subjective: Patient reports the \"tightness\" along the upper aspect of the incision is doing much better.  Still a localized area of tenderness just to the left of the belly button.  Patient states HEP is going well and she is working hard on this to build her strength/endurance. Goes in tomorrow for biopsy.    Objective: Treatment per flow sheet.    Assessment: Patient continues to demonstrate improvement in balance and proximal stabilization noted today with performance of bosu activities as well as progressing body blade onto Airex mat in SLS.  Incorporated additional CKC UE work today including trial of inclined plank row and performance of inclined plank row with alternating UE flexion  - added these to HEP.  Patient is demonstrating improved shoulder and parascapular stabilization with these CKC exercises.    Plan: Continue to progress functional strengthening and postural/core strengthening as tolerated for improved proximal stabilization, functional mobility, and increased endurance with ADL.        Treatment Flow:  Manual: (10 min)  STM/myofascial release to upper aspect of abdominal incision   Therex: (30 min)  SLS on Airex w/ UE body blade up/down 2x30\" each R/L  Bosu retro lunge toe tap x20 each R/L  Bosu lunge toe tap x20 each R/L  YTB at wrists- serratus wall slide 2x10  YTB wall clock x5 cycles   Low plyobox inclined plank w/ alternating UE flexion 2x10  Tral of low plyobox inclined row with 6# (too heavy and smaller weight not high enough to clear knuckles)  Brief review of UE PNF patterns for HEP from last  session      Charges: Man, 2 NIKKI  Total Timed Treatment: 40 minutes   Total Treatment Time: 40 minutes

## 2025-06-17 NOTE — DISCHARGE INSTRUCTIONS
Discharge/After Visit [facility] Department of Radiology  Biopsy of Muscle Soft Tissue    Activity  Take it easy for the rest of the day after your biopsy. You may be sore at the site of the biopsy for the next 5 days. Do not do any strenuous exercises or lift over 5 lbs. for 24 hours after the procedure.     Biopsy Site  Keep the bandage on the biopsy site for the next hour. No shower/bathing restrictions.    Pain Management  You may use over-the-counter or prescribed pain relief medication if not contraindicated due to a medical condition.   You may use a covered ice pack to the procedure site for 20 min, as needed, for pain.   The site may be red or tender for a few days.  You may develop a sore throat after the procedure. If necessary, throat lozenges may be used to relieve the discomfort.     Medications  You may resume your usual home medications, including blood thinners (24 hours after the procedure) if you experience no bleeding or hematoma at the puncture site.     When to seek medical advice  Call the provider that ordered the biopsy with questions and to discuss test results. They may also be available on your [facility] My Chart. You may also call the Radiology nurse at [phone], M-F, 8-5 with any additional questions or concerns.    Dial [phone] and ask the  to page the on-call Radiologist right away if any of these occur:  There is a change in color or temperature of the area where the biopsy was performed.  You develop increasing pain, increased swelling in your neck, difficulty breathing or swallowing.    IF YOU FEEL YOU ARE HAVING AN EMERGENCY,   CALL 911 OR GO TO THE NEAREST EMERGENCY ROOM      4.2.24 MO/  Radiology

## 2025-06-18 ENCOUNTER — HOSPITAL ENCOUNTER (OUTPATIENT)
Dept: ULTRASOUND IMAGING | Facility: HOSPITAL | Age: 50
Discharge: HOME | End: 2025-06-18
Payer: COMMERCIAL

## 2025-06-18 ENCOUNTER — NURSE ONLY (OUTPATIENT)
Dept: LAB | Facility: HOSPITAL | Age: 50
End: 2025-06-18
Payer: COMMERCIAL

## 2025-06-18 VITALS
OXYGEN SATURATION: 100 % | RESPIRATION RATE: 20 BRPM | TEMPERATURE: 97 F | WEIGHT: 160 LBS | HEIGHT: 66 IN | HEART RATE: 54 BPM | SYSTOLIC BLOOD PRESSURE: 101 MMHG | DIASTOLIC BLOOD PRESSURE: 66 MMHG | BODY MASS INDEX: 25.71 KG/M2

## 2025-06-18 DIAGNOSIS — R19.03 RIGHT LOWER QUADRANT ABDOMINAL MASS: Primary | ICD-10-CM

## 2025-06-18 DIAGNOSIS — R19.03 RIGHT LOWER QUADRANT ABDOMINAL MASS: ICD-10-CM

## 2025-06-18 DIAGNOSIS — C49.A0 GASTROINTESTINAL STROMAL TUMOR (GIST) (HCC): ICD-10-CM

## 2025-06-18 LAB
ERYTHROCYTE [DISTWIDTH] IN BLOOD BY AUTOMATED COUNT: 14.5 %
HCT VFR BLD AUTO: 33.2 % (ref 35–48)
HGB BLD-MCNC: 11.2 G/DL (ref 12–16)
INR BLD: 0.94 (ref 0.8–1.2)
MCH RBC QN AUTO: 27.3 PG (ref 26–34)
MCHC RBC AUTO-ENTMCNC: 33.7 G/DL (ref 31–37)
MCV RBC AUTO: 80.8 FL (ref 80–100)
PLATELET # BLD AUTO: 461 10(3)UL (ref 150–450)
PROTHROMBIN TIME: 12.7 SECONDS (ref 11.6–14.8)
RBC # BLD AUTO: 4.11 X10(6)UL (ref 3.8–5.3)
WBC # BLD AUTO: 8.2 X10(3) UL (ref 4–11)

## 2025-06-18 PROCEDURE — 85610 PROTHROMBIN TIME: CPT

## 2025-06-18 PROCEDURE — 85027 COMPLETE CBC AUTOMATED: CPT

## 2025-06-18 PROCEDURE — 88342 IMHCHEM/IMCYTCHM 1ST ANTB: CPT

## 2025-06-18 PROCEDURE — 88381 MICRODISSECTION MANUAL: CPT

## 2025-06-18 PROCEDURE — 81272 KIT GENE TARGETED SEQ ANALYS: CPT

## 2025-06-18 PROCEDURE — 99152 MOD SED SAME PHYS/QHP 5/>YRS: CPT

## 2025-06-18 PROCEDURE — 76942 ECHO GUIDE FOR BIOPSY: CPT

## 2025-06-18 PROCEDURE — 20206 BIOPSY MUSCLE PERQ NEEDLE: CPT

## 2025-06-18 PROCEDURE — 88341 IMHCHEM/IMCYTCHM EA ADD ANTB: CPT

## 2025-06-18 PROCEDURE — 36415 COLL VENOUS BLD VENIPUNCTURE: CPT

## 2025-06-18 PROCEDURE — 81314 PDGFRA GENE: CPT

## 2025-06-18 PROCEDURE — 88305 TISSUE EXAM BY PATHOLOGIST: CPT

## 2025-06-18 RX ORDER — FLUMAZENIL 0.1 MG/ML
0.2 INJECTION INTRAVENOUS AS NEEDED
Status: DISCONTINUED | OUTPATIENT
Start: 2025-06-18 | End: 2025-06-20

## 2025-06-18 RX ORDER — MIDAZOLAM HYDROCHLORIDE 1 MG/ML
1 INJECTION INTRAMUSCULAR; INTRAVENOUS EVERY 5 MIN PRN
Status: ACTIVE | OUTPATIENT
Start: 2025-06-18 | End: 2025-06-18

## 2025-06-18 RX ORDER — MIDAZOLAM HYDROCHLORIDE 1 MG/ML
INJECTION INTRAMUSCULAR; INTRAVENOUS
Status: COMPLETED
Start: 2025-06-18 | End: 2025-06-18

## 2025-06-18 RX ORDER — SODIUM CHLORIDE 9 MG/ML
INJECTION, SOLUTION INTRAVENOUS CONTINUOUS
Status: DISCONTINUED | OUTPATIENT
Start: 2025-06-18 | End: 2025-06-20

## 2025-06-18 RX ORDER — NALOXONE HYDROCHLORIDE 0.4 MG/ML
0.08 INJECTION, SOLUTION INTRAMUSCULAR; INTRAVENOUS; SUBCUTANEOUS AS NEEDED
Status: DISCONTINUED | OUTPATIENT
Start: 2025-06-18 | End: 2025-06-20

## 2025-06-18 RX ORDER — NALOXONE HYDROCHLORIDE 0.4 MG/ML
INJECTION, SOLUTION INTRAMUSCULAR; INTRAVENOUS; SUBCUTANEOUS
Status: DISCONTINUED
Start: 2025-06-18 | End: 2025-06-18 | Stop reason: WASHOUT

## 2025-06-18 RX ORDER — FLUMAZENIL 0.1 MG/ML
INJECTION INTRAVENOUS
Status: DISCONTINUED
Start: 2025-06-18 | End: 2025-06-18 | Stop reason: WASHOUT

## 2025-06-18 RX ADMIN — SODIUM CHLORIDE: 9 INJECTION, SOLUTION INTRAVENOUS at 09:11:00

## 2025-06-18 RX ADMIN — MIDAZOLAM HYDROCHLORIDE 1 MG: 1 INJECTION INTRAMUSCULAR; INTRAVENOUS at 09:35:00

## 2025-06-18 RX ADMIN — MIDAZOLAM HYDROCHLORIDE 0.5 MG: 1 INJECTION INTRAMUSCULAR; INTRAVENOUS at 09:38:00

## 2025-06-18 NOTE — IMAGING NOTE
Patient arrived to radiology holding. Patient name,  and allergies reviewed. IV access obtained as per LDA documentation.  NPO status verified. Pertinent labs and radiology imaging reviewed.    Informed consent for image guided right rectus muscle lesion obtained by Dr. Rishabh Mar.     Biopsy site as per [facility] documentation. Patient denies pain.    Patient tolerated procedural sedation without any immediate complications noted.    Report given to RADHA Javier. Patient transported to [facility] room 2255 accompanied by yosef RN and Nini US tech.

## 2025-06-20 ENCOUNTER — TELEPHONE (OUTPATIENT)
Dept: SURGERY | Facility: CLINIC | Age: 50
End: 2025-06-20

## 2025-06-20 NOTE — TELEPHONE ENCOUNTER
Reviewed pathology of abdominal wall biopsy with patient. She expressed understanding and was upset with result. Discussed that we will review at our group tumor board meeting for additional next steps. She expressed understanding. Has follow up with Dr Be 6/25.    MAXWELL Rose

## 2025-06-24 ENCOUNTER — OFFICE VISIT (OUTPATIENT)
Dept: PHYSICAL THERAPY | Facility: HOSPITAL | Age: 50
End: 2025-06-24
Attending: SURGERY
Payer: COMMERCIAL

## 2025-06-24 PROCEDURE — 97110 THERAPEUTIC EXERCISES: CPT

## 2025-06-24 PROCEDURE — 97140 MANUAL THERAPY 1/> REGIONS: CPT

## 2025-06-24 NOTE — PROGRESS NOTES
Dx:  Decreased functional mobility and endurance (Z74.09)   Date of surgery: 3/14/25  Date of Evaluation: 3/24/25  Referring provider: Tomás Be   Authorized # of visit: [employer] PPO; no visit limit, no auth POC visits: 16 visits extended 5/29/25)  Next MD visits: 6/25/25  Fall Risk: standard Precautions: Cancer      Date: 6/24/25  Tx #: 23    Current Pain Level: not rated today      Subjective: Patient reports she was doing one of her bosu exercises this morning and lost her balance but was able to stabilize herself but felt a twinge in the back of the right hip. Notes it is still sore.  Patient states she got some disappointing news regarding her recent biopsy and will be seeing Dr. Be tomorrow to discuss treatment options. States she is very motivated as she feels she is currently in the best health/fitness she has been in years and just wants to get some \"normalcy\" back to her life if possible.     Objective: Treatment per flow sheet.  Note localized area of tightness/tenderness within the proximal piriformis on the R today with patient noting this is consistent with her location os the \"twinge\" from this morning.    Assessment: Patient tolerating therex well today but does note mild pain in R posterior hip with performance - brief assessment reveals localized tightness/tenderness at the proximal piriformis so this was addressed with STM today as well as pt edu in performance of self soft tissue release with tennis ball (or similar) on wall.  Encouraged patient to alter HEP if needed, focusing more on upper body and core work for the next day or 2 if she feels like this posterior hip soreness/pain persists.    Plan: Continue per plan of care.        Treatment Flow:  Manual (15 min)  Manual STM to R posterior hip including piriformis release   - pt edu in use of tennis ball to perform self soft tissue release to posterior hip against wall  Therex: (25 min)  Turkmen squat 8\" 2x10 each R/L  Bosu lsu to high  knee x25 each R/L  Single leg wall squat series (10 reps-10 sec hold-10 reps) x1 set each R/L        Charges: Man, 2 NIKKI  Total Timed Treatment: 40 minutes   Total Treatment Time: 40 minutes

## 2025-06-25 ENCOUNTER — OFFICE VISIT (OUTPATIENT)
Dept: SURGERY | Facility: CLINIC | Age: 50
End: 2025-06-25
Payer: COMMERCIAL

## 2025-06-25 VITALS
HEART RATE: 77 BPM | DIASTOLIC BLOOD PRESSURE: 80 MMHG | BODY MASS INDEX: 26 KG/M2 | RESPIRATION RATE: 16 BRPM | WEIGHT: 160 LBS | SYSTOLIC BLOOD PRESSURE: 125 MMHG | OXYGEN SATURATION: 98 % | TEMPERATURE: 98 F

## 2025-06-25 DIAGNOSIS — C49.A0 GASTROINTESTINAL STROMAL TUMOR (GIST) (HCC): Primary | ICD-10-CM

## 2025-06-25 PROCEDURE — 3074F SYST BP LT 130 MM HG: CPT | Performed by: SURGERY

## 2025-06-25 PROCEDURE — 99213 OFFICE O/P EST LOW 20 MIN: CPT | Performed by: SURGERY

## 2025-06-25 PROCEDURE — 3079F DIAST BP 80-89 MM HG: CPT | Performed by: SURGERY

## 2025-06-25 RX ORDER — AMLODIPINE BESYLATE 2.5 MG/1
2.5 TABLET ORAL DAILY
COMMUNITY

## 2025-06-26 ENCOUNTER — APPOINTMENT (OUTPATIENT)
Dept: PHYSICAL THERAPY | Facility: HOSPITAL | Age: 50
End: 2025-06-26
Attending: SURGERY
Payer: COMMERCIAL

## 2025-06-26 ENCOUNTER — PATIENT MESSAGE (OUTPATIENT)
Dept: PHYSICAL THERAPY | Facility: HOSPITAL | Age: 50
End: 2025-06-26

## 2025-07-01 ENCOUNTER — OFFICE VISIT (OUTPATIENT)
Dept: PHYSICAL THERAPY | Facility: HOSPITAL | Age: 50
End: 2025-07-01
Attending: SURGERY
Payer: COMMERCIAL

## 2025-07-01 PROCEDURE — 97110 THERAPEUTIC EXERCISES: CPT

## 2025-07-01 NOTE — PROGRESS NOTES
Dx:  Decreased functional mobility and endurance (Z74.09)   Date of surgery: 3/14/25  Date of Evaluation: 3/24/25  Referring provider: Tomás Be   Authorized # of visit: ALEJANDRO PPO; no visit limit, no auth POC visits: 16 visits extended 5/29/25)  Next MD visits: 6/25/25  Fall Risk: standard Precautions: Cancer      Date: 7/1/25  Tx #: 24    Current Pain Level: 0/10      Subjective: Patient reports she had the flu last week and was definitely under the weather. States she has been doing her HEP daily (with exception of the days she was not feeling well) and is tolerating this well.  Continues to be motivated to improved overall functional strength as she is anticipating additional cancer treatment and wants to be as strong as possible going in to it. Notes the R hip pain she had last week is resolved.    Objective: Treatment per flow sheet.      Assessment: Patient is demonstrating decreased LE muscle fatigue with single leg wall squat activity today as compared to previous sessions.  Transitioned Bosu single leg squat to performance with Bosu flat side down as opposed to flat side up, increased challenge for LE stabilization and proximal stabilization - patient struggles with this initially but improves with repeated bouts.  Able to tolerate progression into TRX \"superman\" plank today, demonstrating excellent core stabilization and effort without presence of \"pulling\" abdominal pain.    Plan: Continue per plan of care.        Treatment Flow:  Therex: (40 min)  Shuttle chest press 1G 1Y 3x10  Single leg squat and hold 10-10 sec hold-10 x1 set each R/L  Power slide lateral slides x40  Power slide retro lunge x20 each R/L  A/P tilt board tap x30  Bosu (flat side down) single leg squat x20 each R/L  Review of UE PNF patterns with Tband      Charges: 3TEX  Total Timed Treatment: 40 minutes   Total Treatment Time: 40  minutes

## 2025-07-08 ENCOUNTER — OFFICE VISIT (OUTPATIENT)
Dept: PHYSICAL THERAPY | Facility: HOSPITAL | Age: 50
End: 2025-07-08
Attending: SURGERY
Payer: COMMERCIAL

## 2025-07-08 PROCEDURE — 97110 THERAPEUTIC EXERCISES: CPT

## 2025-07-08 NOTE — PROGRESS NOTES
Dx:  Decreased functional mobility and endurance (Z74.09)   Date of surgery: 3/14/25  Date of Evaluation: 3/24/25  Referring provider: Tomás Ness # of visit: ALEJANDRO RECINOSO; no visit limit, no auth POC visits: 16 visits extended 5/29/25)  Next MD visits: 6/25/25  Fall Risk: standard Precautions: Cancer      Date: 7/8/25  Tx #: 25    Current Pain Level: 0/10      Subjective: Patient reports she is anticipating needing to go back on medication for her cancer and is concerned about how the side effects of being on the medication will effect her functionally as she has had some difficulty in the past with this.  Patient reports she is consistently walking about 4 miles for cardio and is performing strengthening/PT exercises daily as she is feeling like she has been able to work with PT to improve her overall fitness level/strength so that she is in better condition going into treatment.    Objective: Treatment per flow sheet.      Assessment: Patient continues to demonstrate improved core and pelvic strength and stability for proximal stabilization with therex and balance activities.   Patient is demonstrating good self awareness throughout exercises to monitor LE alignment and is able to self correct if she does falter into knee valgus position.  Patient is better able to tolerate therex without c/o \"tension\" or discomfort in the abdominal region but this is still being limited some what to avoid irritation. (Still avoiding higher level core specific work)    Plan: Continue per plan of care.        Treatment Flow:  Therex: (40 min)  UBE L4.o retro x6'  Shuttle chest press 1G 1Y 3x10  Single leg squat and hold 10-10 sec hold-10 x1 set each R/L  Power slide lateral slides 2x30  Power slide retro lunge x20 each R/L  20# DC vector lunge 2x10 each R/L  A/P tilt board tap x30  Airex SLB multi-planar ball toss.        Charges: 3TEX  Total Timed Treatment: 40 minutes   Total Treatment Time: 40  minutes

## 2025-07-10 ENCOUNTER — APPOINTMENT (OUTPATIENT)
Dept: PHYSICAL THERAPY | Facility: HOSPITAL | Age: 50
End: 2025-07-10
Payer: COMMERCIAL

## 2025-07-22 ENCOUNTER — TELEPHONE (OUTPATIENT)
Dept: PHYSICAL THERAPY | Facility: HOSPITAL | Age: 50
End: 2025-07-22

## 2025-07-22 ENCOUNTER — APPOINTMENT (OUTPATIENT)
Dept: PHYSICAL THERAPY | Facility: HOSPITAL | Age: 50
End: 2025-07-22
Attending: SURGERY
Payer: COMMERCIAL

## 2025-07-24 ENCOUNTER — OFFICE VISIT (OUTPATIENT)
Dept: PHYSICAL THERAPY | Facility: HOSPITAL | Age: 50
End: 2025-07-24
Attending: SURGERY
Payer: COMMERCIAL

## 2025-07-24 PROCEDURE — 97110 THERAPEUTIC EXERCISES: CPT

## 2025-07-24 NOTE — PROGRESS NOTES
Dx:  Decreased functional mobility and endurance (Z74.09)   Date of surgery: 3/14/25  Date of Evaluation: 3/24/25  Referring provider: Tomás Ness # of visit: ALEJANDRO PPO; no visit limit, no auth POC visits: 16 visits (extended 5/29/25)  Next MD visits: 6/25/25  Fall Risk: standard Precautions: Cancer      Date: 7/24/25  Tx #: 26    Current Pain Level: 0/10      Subjective: Patient reports she was away on vacation and was able to tolerate up to 5 miles of walking and felt pretty good after.  Notes testing came back and she knows she is going to have to go back on meds and is concerned how the side effects are going to impact her level of function.    Objective: Treatment per flow sheet.      Assessment: Patient is demonstrating good muscular endurance with leg exercises today but fatigues relatively quickly with upper extremity - specifically TRX push up.  Demonstrating good proximal stabilization with DC exercises and no c/o pain with activities requiring increased core stabilization today.    Plan: Continue to progress strengthening/conditioning to promote improved function and endurance leading into upcoming cancer treatment.        Treatment Flow:  Therex: (40 min)  60# DC retro walk w/ controlled return x12  20# DC Paloff punch x20 each R/L  8\" Vietnamese squat 2x20 each B  TRX plank push up 2x10  TRX single leg squat 2x15 each B  Shuttle chest press 1G 1Y 3x10  Standing hip flexor stretch x30\" each B  Shuttle chest press 1B x20      Charges: 3TEX  Total Timed Treatment: 40 minutes   Total Treatment Time: 40 minutes

## 2025-07-29 ENCOUNTER — OFFICE VISIT (OUTPATIENT)
Dept: PHYSICAL THERAPY | Facility: HOSPITAL | Age: 50
End: 2025-07-29
Attending: SURGERY
Payer: COMMERCIAL

## 2025-07-29 PROCEDURE — 97110 THERAPEUTIC EXERCISES: CPT

## 2025-07-31 ENCOUNTER — OFFICE VISIT (OUTPATIENT)
Dept: PHYSICAL THERAPY | Facility: HOSPITAL | Age: 50
End: 2025-07-31
Attending: SURGERY
Payer: COMMERCIAL

## 2025-07-31 PROCEDURE — 97110 THERAPEUTIC EXERCISES: CPT

## 2025-07-31 PROCEDURE — 97140 MANUAL THERAPY 1/> REGIONS: CPT

## 2025-08-05 ENCOUNTER — OFFICE VISIT (OUTPATIENT)
Dept: PHYSICAL THERAPY | Facility: HOSPITAL | Age: 50
End: 2025-08-05
Attending: ORTHOPAEDIC SURGERY

## 2025-08-05 PROCEDURE — 97110 THERAPEUTIC EXERCISES: CPT

## 2025-08-07 ENCOUNTER — OFFICE VISIT (OUTPATIENT)
Dept: PHYSICAL THERAPY | Facility: HOSPITAL | Age: 50
End: 2025-08-07
Attending: SURGERY

## 2025-08-07 PROCEDURE — 97110 THERAPEUTIC EXERCISES: CPT

## 2025-08-12 ENCOUNTER — HOSPITAL ENCOUNTER (OUTPATIENT)
Dept: CT IMAGING | Age: 50
Discharge: HOME | End: 2025-08-12

## 2025-08-12 ENCOUNTER — OFFICE VISIT (OUTPATIENT)
Dept: PHYSICAL THERAPY | Facility: HOSPITAL | Age: 50
End: 2025-08-12
Attending: SURGERY

## 2025-08-12 DIAGNOSIS — C49.A0 GASTROINTESTINAL STROMAL TUMOR (GIST) (HCC): ICD-10-CM

## 2025-08-12 LAB
CREAT BLD-MCNC: 0.8 MG/DL (ref 0.55–1.02)
EGFRCR SERPLBLD CKD-EPI 2021: 91 ML/MIN/1.73M2 (ref 60–?)

## 2025-08-12 PROCEDURE — 82565 ASSAY OF CREATININE: CPT

## 2025-08-12 PROCEDURE — 74177 CT ABD & PELVIS W/CONTRAST: CPT

## 2025-08-12 PROCEDURE — 97110 THERAPEUTIC EXERCISES: CPT

## 2025-08-15 ENCOUNTER — TELEPHONE (OUTPATIENT)
Dept: SURGERY | Facility: CLINIC | Age: 50
End: 2025-08-15

## 2025-08-18 ENCOUNTER — TELEPHONE (OUTPATIENT)
Dept: SURGERY | Facility: CLINIC | Age: 50
End: 2025-08-18

## 2025-08-18 DIAGNOSIS — N39.3 STRESS INCONTINENCE: Primary | ICD-10-CM

## 2025-08-19 ENCOUNTER — OFFICE VISIT (OUTPATIENT)
Dept: PHYSICAL THERAPY | Facility: HOSPITAL | Age: 50
End: 2025-08-19
Attending: SURGERY

## 2025-08-19 PROCEDURE — 97110 THERAPEUTIC EXERCISES: CPT

## 2025-08-21 ENCOUNTER — APPOINTMENT (OUTPATIENT)
Dept: PHYSICAL THERAPY | Facility: HOSPITAL | Age: 50
End: 2025-08-21

## 2025-08-21 ENCOUNTER — OFFICE VISIT (OUTPATIENT)
Dept: PHYSICAL THERAPY | Facility: HOSPITAL | Age: 50
End: 2025-08-21
Attending: SURGERY

## 2025-08-21 PROCEDURE — 97110 THERAPEUTIC EXERCISES: CPT

## 2025-08-21 PROCEDURE — 97530 THERAPEUTIC ACTIVITIES: CPT

## 2025-08-26 ENCOUNTER — OFFICE VISIT (OUTPATIENT)
Dept: PHYSICAL THERAPY | Facility: HOSPITAL | Age: 50
End: 2025-08-26
Attending: SURGERY

## 2025-08-26 ENCOUNTER — APPOINTMENT (OUTPATIENT)
Dept: PHYSICAL THERAPY | Facility: HOSPITAL | Age: 50
End: 2025-08-26

## 2025-08-26 PROCEDURE — 97110 THERAPEUTIC EXERCISES: CPT

## 2025-08-28 ENCOUNTER — OFFICE VISIT (OUTPATIENT)
Dept: PHYSICAL THERAPY | Facility: HOSPITAL | Age: 50
End: 2025-08-28
Attending: SURGERY

## 2025-08-28 ENCOUNTER — APPOINTMENT (OUTPATIENT)
Dept: PHYSICAL THERAPY | Facility: HOSPITAL | Age: 50
End: 2025-08-28

## 2025-08-28 PROCEDURE — 97110 THERAPEUTIC EXERCISES: CPT

## (undated) DEVICE — 1200CC GUARDIAN II: Brand: GUARDIAN

## (undated) DEVICE — STERILE SYNTHETIC NEOPRENE POWDER-FREE SURGICAL GLOVES WITH NITRILE COATING, SMOOTH FINISH, STRAIGHT FINGER: Brand: PROTEXIS

## (undated) DEVICE — 10FT COMBINED O2 DELIVERY/CO2 MONITORING. FILTER WITH MICROSTREAM TYPE LUER: Brand: DUAL ADULT NASAL CANNULA

## (undated) DEVICE — KIT VLV 5 PC AIR H2O SUCT BX ENDOGATOR CONN

## (undated) DEVICE — SUT PERMA- 2-0 30IN SH NABSRB BLK L26MM 1/

## (undated) DEVICE — SUT ETHLN 3-0 18IN PS-2 NABSRB BLK 19MM 3/8 C

## (undated) DEVICE — SUT PERMA- 0 30IN FSL NABSRB BLK 30MM 3/8

## (undated) DEVICE — 3M™ RED DOT™ MONITORING ELECTRODE WITH FOAM TAPE AND STICKY GEL, 50/BAG, 20/CASE, 72/PLT 2570: Brand: RED DOT™

## (undated) DEVICE — SUT PROL 1-0 30IN CTX NABSRB BLU L30MM 1/2 CI

## (undated) DEVICE — DRAIN SUR 15FR L3/16IN DIA4.7MM SIL RND

## (undated) DEVICE — CASSETTE DRAPE: Brand: UNBRANDED

## (undated) DEVICE — GAMMEX® NON-LATEX PI UNDERGLOVE SIZE 6, STERILE POLYISOPRENE POWDER-FREE SURGICAL GLOVE: Brand: GAMMEX

## (undated) DEVICE — DRAIN CHST SGL COLL 1 PT TB FOR ATS BG CMPTBL

## (undated) DEVICE — KIT CUSTOM ENDOPROCEDURE STERIS

## (undated) DEVICE — BLANKET THERAPEUTIC HYPO HYPERTHERMIA ADULT POLYURETHANE 24 X 60 INCH NOT MADE WITH NATURAL RUBBER LATEX MEDICHOICE: Brand: MEDICHOICE

## (undated) DEVICE — SUT PDS II 1 96IN TP-1 ABSRB VLT L65MM 1/2

## (undated) DEVICE — SUT PROL 2-0 48IN MH NABSRB BLU L36MM 1/2 CIR

## (undated) DEVICE — SUT PERMA- 3-0 18IN SH NABSRB BLK CR 26MM

## (undated) DEVICE — STERILE SYNTHETIC POLYISOPRENE POWDER-FREE SURGICAL GLOVES WITH HYDROGEL COATING, SMOOTH FINISH, STRAIGHT FINGER: Brand: PROTEXIS

## (undated) DEVICE — EVACUATOR SUR 100CC SIL BLB WND

## (undated) DEVICE — BINDER ABD UNISX 9IN 62IN L AND XL UNIV

## (undated) DEVICE — V2 SPECIMEN COLLECTION MANIFOLD KIT: Brand: NEPTUNE

## (undated) DEVICE — CATHETER THOR 24FR L22IN BLU RADPQ STRP THRM

## (undated) DEVICE — SUT ETHLN 4-0 18IN NABSRB BLK 19MM PS

## (undated) DEVICE — GOWN,PREVENTION PLUS,XL,ST,24/CS: Brand: MEDLINE

## (undated) DEVICE — POWER SHELL: Brand: SIGNIA

## (undated) DEVICE — CATHETER URETH 14FR L16IN RED RUB RND HLLW

## (undated) DEVICE — GOWN,PREVENTION PLUS,L,ST,24/CS: Brand: MEDLINE

## (undated) DEVICE — GOWN,SIRUS,FABRNF,XL,20/CS: Brand: MEDLINE

## (undated) DEVICE — FILTER SMOKE EVACUATR PLUMEVAC

## (undated) DEVICE — COVER,LIGHT,CAMERA,HARD,1/PK,STRL: Brand: MEDLINE

## (undated) DEVICE — SLEEVE COMPR MD KNEE LEN SGL USE KENDALL SCD

## (undated) DEVICE — YANKAUER,POOLE TIP,STERILE,50/CS: Brand: MEDLINE

## (undated) DEVICE — SUT COAT VCRL 2-0 27IN SH ABSRB UD 26MM 1/2

## (undated) DEVICE — GLOVE,SURG,SENSICARE,ALOE,LF,PF,6: Brand: MEDLINE

## (undated) DEVICE — CONTAINER,SPECIMEN,PNEU TUBE,4OZ,OR STRL: Brand: MEDLINE

## (undated) DEVICE — MEDI-VAC NON-CONDUCTIVE SUCTION TUBING: Brand: CARDINAL HEALTH

## (undated) DEVICE — GLOVE SUR 7 SENSICARE NEOPR PWD F

## (undated) DEVICE — SUT PERMA- 3-0 30IN SH NABSRB BLK 26MM 1/2

## (undated) DEVICE — DRAPE FLD WRM W44XL66IN C6L FOR INTRATEMP SYS

## (undated) DEVICE — GLOVE SUR 6.5 SENSICARE PI PIP CRM PWD F

## (undated) DEVICE — SURG GL, SENSICARE PI ORTHO LT,LF,PF,7.5: Brand: MEDLINE

## (undated) DEVICE — GIJAW SINGLE-USE BIOPSY FORCEPS WITH NEEDLE: Brand: GIJAW

## (undated) DEVICE — ANTIBACTERIAL UNDYED BRAIDED (POLYGLACTIN 910), SYNTHETIC ABSORBABLE SUTURE: Brand: COATED VICRYL

## (undated) DEVICE — ARTICULATING RELOAD WITH TRI-STAPLE TECHNOLOGY: Brand: ENDO GIA

## (undated) DEVICE — SOLUTION IRRIG 1000ML 0.9% NACL USP BTL

## (undated) DEVICE — CURVED JAW CORDLESS ULTRASONIC DISSECTOR: Brand: SONICISION 7

## (undated) DEVICE — SUT PROL 1 30IN CT-1 NABSRB BLU L36MM 1/2 CIR

## (undated) DEVICE — 40580 - THE PINK PAD - ADVANCED TRENDELENBURG POSITIONING KIT: Brand: 40580 - THE PINK PAD - ADVANCED TRENDELENBURG POSITIONING KIT

## (undated) DEVICE — SUT PROL 3-0 36IN SH NABSRB BLU 26MM 1/2 CIR

## (undated) DEVICE — VASELINE PETROLATUM GAUZE STRIP: Brand: VASELINE

## (undated) DEVICE — HIPEC PACK: Brand: MEDLINE INDUSTRIES, INC.

## (undated) DEVICE — 2000CC GUARDIAN II: Brand: GUARDIAN

## (undated) DEVICE — SUT PROL 4-0 36IN RB-1 NABSRB BLU 17MM 1/2 CI

## (undated) DEVICE — PAD DSG 8X12IN THK0.5IN FOAM SIL BK ADH

## (undated) NOTE — LETTER
Patient Name: Mark Stearns        : 1975       Medical Record #: [id number]    CONSENT FOR PROCEDURES/SEDATION    Date: 2025       Time: 11:34 AM        1. I authorize the performance upon Mark Stearns the following:    ___________Bupivacaine injection in periumbilical area_________    2. I authorize Dayami Damon PA-C to perform the above mentioned procedures.    3. If any unforeseen conditions arise during this procedure calling for additional procedures, operations, or medications (including anesthesia and blood transfusion), I  further request and authorize the doctor to do whatever he/she deems advisable in my interest.    4. I consent to the taking and reproduction of any photographs in the course of this procedure for professional purposes.    5. I consent to the administration of such sedation as may be considered necessary or advisable by the physician responsible for this service, with the exception of  _____________________________.    6. I have been informed by my doctor of the nature and purpose of this procedure/sedation, possible alternative methods of treatment, risk involved and possible complications.      Signature of Patient:  ___________________________    Signature of person authorized to consent for patient: Relationship to patient:  ___________________________    ___________________    Witness: ____________________     Date: ______________    Provider: ____________________     Date: ______________

## (undated) NOTE — LETTER
OUTSIDE TESTING RESULT REQUEST     IMPORTANT: FOR YOUR IMMEDIATE ATTENTION  Please FAX all test results listed below to: [phone]     Testing already done on or about: 25     * * * * If testing is NOT complete, arrange with patient A.S.A.P. * * * *      Patient Name: Mark Stearns  Surgery Date: 3/14/2025  Medical Record: [id number]  CSN: [id number]  : 1975 - A: 50 y     Sex: female  Surgeon(s):  Tomás Be MD  Procedure: Cytoreductive surgery, possible multi-organ resection  Anesthesia Type: General     Surgeon: Tomás Be MD     The following Testing and Time Line are REQUIRED PER ANESTHESIA     EKG READ AND SIGNED WITHIN   90 days      Thank You,   Sent by:QIANA GARCIA

## (undated) NOTE — LETTER
To: Dr. Arenas  Patient Name: Mark Stearns-Age / Sex: 1975-A: 50 y  female   Medical Records: [id number] [facility]: [id number]    Request for History & Physical for Radiology Procedure at [facility]    The above patient is scheduled to have a procedure performed in Radiology.  In order for the procedure to be performed safely, a comprehensive History & Physical, to include the Review of Systems, is required within 30 days of the scheduled appointment.      Procedure: US Biopsy Muscle    Date Scheduled: 2025    Ordered by (Ordering Physician):  Dayami Damon PA    Please FAX the completed H&P to [facility] at [phone].  For Questions: Call [facility] [phone]    If you cannot provide us with a comprehensive History & Physical prior to this appointment, you will need to cancel and reschedule the appointment by calling Central Scheduling [phone].  Thank you.

## (undated) NOTE — LETTER
Patient Name: Mark Stearns        : 1975       Medical Record #: [id number]    CONSENT FOR PROCEDURES/SEDATION    Date: 2025       Time: 8:00 AM        1. I authorize the performance upon Mark Stearns the following:    ___________________Image Guided Right Rectus Muscle Biopsy_______________    2. I authorize Dr. Rishabh Mar (and whomever is designated as the doctor’s assistant), to perform the above mentioned procedures.    3. If any unforeseen conditions arise during this procedure calling for additional procedures, operations, or medications (including anesthesia and blood transfusion), I  further request and authorize the doctor to do whatever he/she deems advisable in my interest.    4. I consent to the taking and reproduction of any photographs in the course of this procedure for professional purposes.    5. I consent to the administration of such sedation as may be considered necessary or advisable by the physician responsible for this service, with the exception of  _____________.    6. I have been informed by my doctor of the nature and purpose of this procedure/sedation, possible alternative methods of treatment, risk involved and possible complications.      Signature of Patient:  ___________________________    Signature of person authorized to consent for patient: Relationship to patient:  ___________________________    ___________________    Witness: ____________________     Date: ______________    Provider: ____________________     Date: ______________

## (undated) NOTE — LETTER
Patient Name: Mark Stearns        : 1975       Medical Record #: [id number]    CONSENT FOR PROCEDURES/SEDATION    Date: 2025       Time: 8:34 AM        1. I authorize the performance upon Mark Stearns the following:    ____________image guided right rectus muscle lesion biopsy _______________________    2. I authorize Dr. Mar (and whomever is designated as the doctor’s assistant), to perform the above mentioned procedures.    3. If any unforeseen conditions arise during this procedure calling for additional procedures, operations, or medications (including anesthesia and blood transfusion), I  further request and authorize the doctor to do whatever he/she deems advisable in my interest.    4. I consent to the taking and reproduction of any photographs in the course of this procedure for professional purposes.    5. I consent to the administration of such sedation as may be considered necessary or advisable by the physician responsible for this service, with the exception of  _____________________________.    6. I have been informed by my doctor of the nature and purpose of this procedure/sedation, possible alternative methods of treatment, risk involved and possible complications.      Signature of Patient:  ___________________________    Signature of person authorized to consent for patient: Relationship to patient:  ___________________________    ___________________    Witness: ____________________     Date: ______________    Provider: ____________________     Date: ______________